# Patient Record
Sex: FEMALE | Race: WHITE | NOT HISPANIC OR LATINO | Employment: FULL TIME | URBAN - METROPOLITAN AREA
[De-identification: names, ages, dates, MRNs, and addresses within clinical notes are randomized per-mention and may not be internally consistent; named-entity substitution may affect disease eponyms.]

---

## 2017-08-02 ENCOUNTER — OFFICE VISIT (OUTPATIENT)
Dept: INTERNAL MEDICINE | Facility: CLINIC | Age: 57
End: 2017-08-02

## 2017-08-02 VITALS
HEART RATE: 72 BPM | OXYGEN SATURATION: 97 % | DIASTOLIC BLOOD PRESSURE: 72 MMHG | BODY MASS INDEX: 25.96 KG/M2 | SYSTOLIC BLOOD PRESSURE: 128 MMHG | WEIGHT: 156 LBS

## 2017-08-02 DIAGNOSIS — M19.049 PRIMARY OSTEOARTHRITIS OF HAND, UNSPECIFIED LATERALITY: Primary | ICD-10-CM

## 2017-08-02 DIAGNOSIS — T14.8XXA MUSCULOSKELETAL STRAIN: ICD-10-CM

## 2017-08-02 PROCEDURE — 99213 OFFICE O/P EST LOW 20 MIN: CPT | Performed by: NURSE PRACTITIONER

## 2017-08-02 PROCEDURE — 96372 THER/PROPH/DIAG INJ SC/IM: CPT | Performed by: NURSE PRACTITIONER

## 2017-08-02 RX ORDER — KETOROLAC TROMETHAMINE 30 MG/ML
60 INJECTION, SOLUTION INTRAMUSCULAR; INTRAVENOUS ONCE
Status: COMPLETED | OUTPATIENT
Start: 2017-08-02 | End: 2017-08-02

## 2017-08-02 RX ADMIN — KETOROLAC TROMETHAMINE 60 MG: 30 INJECTION, SOLUTION INTRAMUSCULAR; INTRAVENOUS at 14:57

## 2017-08-02 NOTE — PROGRESS NOTES
Subjective  Back strain/pain x3 days (discuss referral to [facility] (arthritis specialist))      Sherin Azul is a 57 y.o. female.   No Known Allergies  History of Present Illness      Digging stumps 3 days ago, bent over and back seized up, pain is above butt and intermittent but when it does come it is 10/10, did buy a back brace, has been taking aleve and doing heating pad with small amt relief , does not want to do prednisone     Wants to see dr west for her OA  The following portions of the patient's history were reviewed and updated as appropriate: allergies, current medications, past family history, past medical history, past social history, past surgical history and problem list.    Review of Systems   Musculoskeletal: Positive for arthralgias and back pain.   All other systems reviewed and are negative.      Objective   Physical Exam   Constitutional: She is oriented to person, place, and time. She appears well-developed and well-nourished.   HENT:   Head: Normocephalic and atraumatic.   Eyes: Conjunctivae are normal.   Cardiovascular: Normal rate.    Pulmonary/Chest: Effort normal.   Musculoskeletal:        Lumbar back: She exhibits tenderness, pain and spasm.   Neurological: She is alert and oriented to person, place, and time.   Skin: Skin is warm.   Psychiatric: She has a normal mood and affect. Her behavior is normal. Judgment and thought content normal.   Nursing note and vitals reviewed.    /72  Pulse 72  Wt 156 lb (70.8 kg)  SpO2 97%  BMI 25.96 kg/m2    Assessment/Plan     Problem List Items Addressed This Visit        Musculoskeletal and Integument    Osteoarthritis, hand - Primary    Relevant Orders    Ambulatory Referral to Rheumatology      Other Visit Diagnoses     Musculoskeletal strain        Relevant Medications    ketorolac (TORADOL) injection 60 mg (Start on 8/2/2017  2:45 PM)        No aleve today, alternate heat/ice therapy , if no relief 4 days will consider PT

## 2017-09-11 ENCOUNTER — OFFICE VISIT (OUTPATIENT)
Dept: INTERNAL MEDICINE | Facility: CLINIC | Age: 57
End: 2017-09-11

## 2017-09-11 VITALS
HEART RATE: 74 BPM | SYSTOLIC BLOOD PRESSURE: 128 MMHG | DIASTOLIC BLOOD PRESSURE: 82 MMHG | OXYGEN SATURATION: 99 % | WEIGHT: 164.02 LBS | BODY MASS INDEX: 27.29 KG/M2

## 2017-09-11 DIAGNOSIS — N63.0 SUBCUTANEOUS NODULE OF BREAST: Primary | ICD-10-CM

## 2017-09-11 PROCEDURE — 99213 OFFICE O/P EST LOW 20 MIN: CPT | Performed by: NURSE PRACTITIONER

## 2017-09-11 NOTE — PROGRESS NOTES
Subjective  Follow-up (Lump in Right Arm Pit)      Sherin Azul is a 57 y.o. female.   No Known Allergies  History of Present Illness      Noticed lump in right axilla x 2 mos,no changes , feels deep , tender , no fever chills, constant ache   The following portions of the patient's history were reviewed and updated as appropriate: allergies, current medications, past family history, past medical history, past social history, past surgical history and problem list.    Review of Systems   Skin:        Axilla nodule   All other systems reviewed and are negative.      Objective   Physical Exam   Constitutional: She is oriented to person, place, and time. She appears well-developed and well-nourished.   HENT:   Head: Normocephalic and atraumatic.   Eyes: Conjunctivae are normal.   Cardiovascular: Normal rate.    Pulmonary/Chest: Effort normal.       Neurological: She is alert and oriented to person, place, and time.   Skin: Skin is warm and dry.   Psychiatric: She has a normal mood and affect. Her behavior is normal. Judgment and thought content normal.   Nursing note and vitals reviewed.    /82  Pulse 74  Wt 164 lb 0.4 oz (74.4 kg)  SpO2 99%  BMI 27.29 kg/m2    Assessment/Plan     Problem List Items Addressed This Visit     None      Visit Diagnoses     Subcutaneous nodule of breast    -  Primary    Relevant Orders    Mammo Diagnostic Bilateral With CAD

## 2017-09-20 ENCOUNTER — APPOINTMENT (OUTPATIENT)
Dept: MAMMOGRAPHY | Facility: HOSPITAL | Age: 57
End: 2017-09-20

## 2017-09-26 ENCOUNTER — HOSPITAL ENCOUNTER (OUTPATIENT)
Dept: ULTRASOUND IMAGING | Facility: HOSPITAL | Age: 57
Discharge: HOME | End: 2017-09-26

## 2017-09-26 ENCOUNTER — HOSPITAL ENCOUNTER (OUTPATIENT)
Dept: MAMMOGRAPHY | Facility: HOSPITAL | Age: 57
Discharge: HOME | End: 2017-09-26
Admitting: NURSE PRACTITIONER

## 2017-09-26 DIAGNOSIS — N63.0 SUBCUTANEOUS NODULE OF BREAST: ICD-10-CM

## 2017-09-26 PROCEDURE — 76642 ULTRASOUND BREAST LIMITED: CPT

## 2017-09-26 PROCEDURE — 77062 BREAST TOMOSYNTHESIS BI: CPT | Performed by: RADIOLOGY

## 2017-09-26 PROCEDURE — G0204 DX MAMMO INCL CAD BI: HCPCS

## 2017-09-26 PROCEDURE — 77066 DX MAMMO INCL CAD BI: CPT | Performed by: RADIOLOGY

## 2017-09-26 PROCEDURE — G0279 TOMOSYNTHESIS, MAMMO: HCPCS

## 2017-09-26 PROCEDURE — 76642 ULTRASOUND BREAST LIMITED: CPT | Performed by: RADIOLOGY

## 2018-04-12 ENCOUNTER — TELEPHONE (OUTPATIENT)
Dept: INTERNAL MEDICINE | Facility: CLINIC | Age: 58
End: 2018-04-12

## 2018-04-12 ENCOUNTER — OFFICE VISIT (OUTPATIENT)
Dept: INTERNAL MEDICINE | Facility: CLINIC | Age: 58
End: 2018-04-12

## 2018-04-12 VITALS
HEART RATE: 76 BPM | WEIGHT: 156 LBS | BODY MASS INDEX: 25.96 KG/M2 | SYSTOLIC BLOOD PRESSURE: 108 MMHG | TEMPERATURE: 98.3 F | RESPIRATION RATE: 20 BRPM | DIASTOLIC BLOOD PRESSURE: 66 MMHG

## 2018-04-12 DIAGNOSIS — L03.311 CELLULITIS OF ABDOMINAL WALL: Primary | ICD-10-CM

## 2018-04-12 DIAGNOSIS — M79.642 BILATERAL HAND PAIN: ICD-10-CM

## 2018-04-12 DIAGNOSIS — M79.641 BILATERAL HAND PAIN: ICD-10-CM

## 2018-04-12 PROCEDURE — 99213 OFFICE O/P EST LOW 20 MIN: CPT | Performed by: NURSE PRACTITIONER

## 2018-04-12 RX ORDER — SULFAMETHOXAZOLE AND TRIMETHOPRIM 800; 160 MG/1; MG/1
1 TABLET ORAL 2 TIMES DAILY
Qty: 20 TABLET | Refills: 0 | Status: SHIPPED | OUTPATIENT
Start: 2018-04-12 | End: 2018-05-04

## 2018-04-12 RX ORDER — SODIUM PHOSPHATE,MONO-DIBASIC 19G-7G/118
ENEMA (ML) RECTAL 2 TIMES DAILY WITH MEALS
COMMUNITY
End: 2020-02-14

## 2018-04-12 RX ORDER — NAPROXEN 500 MG/1
500 TABLET ORAL 2 TIMES DAILY WITH MEALS
Qty: 30 TABLET | Refills: 0 | Status: SHIPPED | OUTPATIENT
Start: 2018-04-12 | End: 2018-05-22

## 2018-04-12 NOTE — PATIENT INSTRUCTIONS
Warm moist heat to lesion.  Do not polyp or tried to squeeze lesion.  Bactrim as discussed.  Naprosyn for joint pain follow-up with her primary care on Monday if not improving sooner if worsens

## 2018-04-12 NOTE — TELEPHONE ENCOUNTER
WOULD LIKE TO KNOW IF [facility] WILL ORDER LABS TO CHECK CHOLESTEROL AND TRIGLYCERIDES. THEY WERE HIGH THE LAST TIME SHE HAD THEM CHECKED AND WOULD LIKETO KNOW IF THERE HAS BEEN A CHANGE IN LEVELS AFTER DIET CHANGE.    CALL BACK [phone]

## 2018-04-12 NOTE — PROGRESS NOTES
Subjective   Sherin Azul is a 57 y.o. female.   Chief Complaint   Patient presents with   • Recurrent Skin Infections      History of Present Illness   has lesion adjacent to the abdomen directly above the umbilicus.  She denies drainage.  No fever or chills.  States the lesion is very sore.  She has a history of MRSA infection several years ago..  She does report she also has arthritis in her hands and she has been working extra.  She says the ibuprofen and Aleve over-the-counter are not helping.    The following portions of the patient's history were reviewed and updated as appropriate: allergies, current medications, past family history, past medical history, past social history, past surgical history and problem list.    Current Outpatient Prescriptions:   •  docusate sodium (STOOL SOFTENER) 100 MG capsule, Take  by mouth., Disp: , Rfl:   •  glucosamine sulfate 500 MG capsule capsule, Take  by mouth 2 (Two) Times a Day With Meals., Disp: , Rfl:   •  omeprazole (priLOSEC) 20 MG capsule, One po qd, Disp: 30 capsule, Rfl: 11  •  naproxen (NAPROSYN) 500 MG tablet, Take 1 tablet by mouth 2 (Two) Times a Day With Meals., Disp: 30 tablet, Rfl: 0  •  sulfamethoxazole-trimethoprim (BACTRIM DS) 800-160 MG per tablet, Take 1 tablet by mouth 2 (Two) Times a Day., Disp: 20 tablet, Rfl: 0    Review of Systems   Constitutional: Negative for activity change, appetite change, fatigue and fever.   HENT: Negative for ear pain, sinus pressure and sore throat.    Eyes: Negative.    Respiratory: Negative for cough and shortness of breath.    Cardiovascular: Negative for chest pain.   Gastrointestinal: Positive for abdominal pain. Negative for diarrhea, nausea and vomiting.        See history of present illness   Genitourinary: Negative for difficulty urinating.   Musculoskeletal: Positive for arthralgias. Negative for back pain and myalgias.        See history of present illness   Neurological: Negative for headaches.    Hematological: Negative.      /66   Pulse 76   Temp 98.3 °F (36.8 °C) (Oral)   Resp 20   Wt 70.8 kg (156 lb)   BMI 25.96 kg/m²     Objective   No Known Allergies    Physical Exam   Constitutional: She is oriented to person, place, and time. She appears well-developed and well-nourished. No distress.   Abdominal:   Has pustule to area above the umbilicus.  Some redness (less than 1 cm )surrounding pustule.   Musculoskeletal:   Tender to the knuckles of both hands.  No brina deformity noted.   Neurological: She is alert and oriented to person, place, and time.   Skin: Skin is warm and dry.   Color pink no rash   Nursing note and vitals reviewed.      Procedures    Assessment/Plan   Sherin was seen today for recurrent skin infections.    Diagnoses and all orders for this visit:    Cellulitis of abdominal wall  -     sulfamethoxazole-trimethoprim (BACTRIM DS) 800-160 MG per tablet; Take 1 tablet by mouth 2 (Two) Times a Day.    Bilateral hand pain  -     naproxen (NAPROSYN) 500 MG tablet; Take 1 tablet by mouth 2 (Two) Times a Day With Meals.          Patient Instructions   Warm moist heat to lesion.  Do not polyp or tried to squeeze lesion.  Bactrim as discussed.  Naprosyn for joint pain follow-up with her primary care on Monday if not improving sooner if worsens      Heather Rush, ROGERIO

## 2018-04-13 DIAGNOSIS — E78.5 HYPERLIPIDEMIA, UNSPECIFIED HYPERLIPIDEMIA TYPE: Primary | ICD-10-CM

## 2018-05-04 ENCOUNTER — OFFICE VISIT (OUTPATIENT)
Dept: INTERNAL MEDICINE | Facility: CLINIC | Age: 58
End: 2018-05-04

## 2018-05-04 VITALS
WEIGHT: 155 LBS | HEIGHT: 65 IN | HEART RATE: 77 BPM | BODY MASS INDEX: 25.83 KG/M2 | OXYGEN SATURATION: 98 % | DIASTOLIC BLOOD PRESSURE: 66 MMHG | SYSTOLIC BLOOD PRESSURE: 120 MMHG

## 2018-05-04 DIAGNOSIS — B00.1 RECURRENT COLD SORES: ICD-10-CM

## 2018-05-04 DIAGNOSIS — Z00.00 ROUTINE ADULT HEALTH MAINTENANCE: Primary | ICD-10-CM

## 2018-05-04 DIAGNOSIS — Z11.59 NEED FOR HEPATITIS C SCREENING TEST: ICD-10-CM

## 2018-05-04 DIAGNOSIS — R20.0 BILATERAL HAND NUMBNESS: ICD-10-CM

## 2018-05-04 LAB
ALBUMIN SERPL-MCNC: 4.5 G/DL (ref 3.2–4.8)
ALBUMIN/GLOB SERPL: 1.7 G/DL (ref 1.5–2.5)
ALP SERPL-CCNC: 82 U/L (ref 25–100)
ALT SERPL W P-5'-P-CCNC: 28 U/L (ref 7–40)
ANION GAP SERPL CALCULATED.3IONS-SCNC: 8 MMOL/L (ref 3–11)
ARTICHOKE IGE QN: 152 MG/DL (ref 0–130)
AST SERPL-CCNC: 27 U/L (ref 0–33)
BILIRUB SERPL-MCNC: 0.4 MG/DL (ref 0.3–1.2)
BUN BLD-MCNC: 17 MG/DL (ref 9–23)
BUN/CREAT SERPL: 28.3 (ref 7–25)
CALCIUM SPEC-SCNC: 9.1 MG/DL (ref 8.7–10.4)
CHLORIDE SERPL-SCNC: 107 MMOL/L (ref 99–109)
CHOLEST SERPL-MCNC: 211 MG/DL (ref 0–200)
CO2 SERPL-SCNC: 27 MMOL/L (ref 20–31)
CREAT BLD-MCNC: 0.6 MG/DL (ref 0.6–1.3)
DEPRECATED RDW RBC AUTO: 43.7 FL (ref 37–54)
ERYTHROCYTE [DISTWIDTH] IN BLOOD BY AUTOMATED COUNT: 12.8 % (ref 11.3–14.5)
GFR SERPL CREATININE-BSD FRML MDRD: 103 ML/MIN/1.73
GLOBULIN UR ELPH-MCNC: 2.6 GM/DL
GLUCOSE BLD-MCNC: 80 MG/DL (ref 70–100)
HCT VFR BLD AUTO: 39.5 % (ref 34.5–44)
HCV AB SER DONR QL: NORMAL
HDLC SERPL-MCNC: 54 MG/DL (ref 40–60)
HGB BLD-MCNC: 13.1 G/DL (ref 11.5–15.5)
MCH RBC QN AUTO: 31.3 PG (ref 27–31)
MCHC RBC AUTO-ENTMCNC: 33.2 G/DL (ref 32–36)
MCV RBC AUTO: 94.3 FL (ref 80–99)
PLATELET # BLD AUTO: 234 10*3/MM3 (ref 150–450)
PMV BLD AUTO: 9 FL (ref 6–12)
POTASSIUM BLD-SCNC: 4 MMOL/L (ref 3.5–5.5)
PROT SERPL-MCNC: 7.1 G/DL (ref 5.7–8.2)
RBC # BLD AUTO: 4.19 10*6/MM3 (ref 3.89–5.14)
SODIUM BLD-SCNC: 142 MMOL/L (ref 132–146)
TRIGL SERPL-MCNC: 104 MG/DL (ref 0–150)
TSH SERPL DL<=0.05 MIU/L-ACNC: 1.83 MIU/ML (ref 0.35–5.35)
WBC NRBC COR # BLD: 8 10*3/MM3 (ref 3.5–10.8)

## 2018-05-04 PROCEDURE — 99396 PREV VISIT EST AGE 40-64: CPT | Performed by: NURSE PRACTITIONER

## 2018-05-04 PROCEDURE — 86695 HERPES SIMPLEX TYPE 1 TEST: CPT | Performed by: NURSE PRACTITIONER

## 2018-05-04 PROCEDURE — 80053 COMPREHEN METABOLIC PANEL: CPT | Performed by: NURSE PRACTITIONER

## 2018-05-04 PROCEDURE — 86696 HERPES SIMPLEX TYPE 2 TEST: CPT | Performed by: NURSE PRACTITIONER

## 2018-05-04 PROCEDURE — 80061 LIPID PANEL: CPT | Performed by: NURSE PRACTITIONER

## 2018-05-04 PROCEDURE — 86803 HEPATITIS C AB TEST: CPT | Performed by: NURSE PRACTITIONER

## 2018-05-04 PROCEDURE — 84443 ASSAY THYROID STIM HORMONE: CPT | Performed by: NURSE PRACTITIONER

## 2018-05-04 PROCEDURE — 85027 COMPLETE CBC AUTOMATED: CPT | Performed by: NURSE PRACTITIONER

## 2018-05-04 NOTE — PROGRESS NOTES
"Subjective  Annual Exam (w/o pap. Would like labs to check for HSV )      Sherin Azul is a 58 y.o. female.   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    Review of Systems   Genitourinary: Positive for genital sores (none visible today ).   Neurological: Positive for numbness.   All other systems reviewed and are negative.      Objective   Physical Exam   Constitutional: She is oriented to person, place, and time. She appears well-developed and well-nourished.   HENT:   Head: Normocephalic and atraumatic.   Right Ear: External ear normal.   Left Ear: External ear normal.   Mouth/Throat: Oropharynx is clear and moist.   Eyes: Conjunctivae and EOM are normal. Pupils are equal, round, and reactive to light.   Neck: Normal range of motion. Neck supple. No thyromegaly present.   Cardiovascular: Normal rate, regular rhythm, normal heart sounds and intact distal pulses.    Pulmonary/Chest: Effort normal and breath sounds normal.   Abdominal: Soft. Bowel sounds are normal.   Musculoskeletal: Normal range of motion.   Lymphadenopathy:     She has no cervical adenopathy.   Neurological: She is alert and oriented to person, place, and time. She has normal reflexes. She displays normal reflexes. No cranial nerve deficit.   Skin: Skin is warm and dry.   Psychiatric: She has a normal mood and affect. Her behavior is normal. Judgment and thought content normal.   Nursing note and vitals reviewed.    /66   Pulse 77   Ht 165.1 cm (65\")   Wt 70.3 kg (155 lb)   SpO2 98%   BMI 25.79 kg/m² "     Assessment/Plan     Problem List Items Addressed This Visit     None      Visit Diagnoses     Routine adult health maintenance    -  Primary    Relevant Orders    CBC (No Diff)    Comprehensive Metabolic Panel    TSH    Lipid Panel    Need for hepatitis C screening test        Relevant Orders    Hepatitis C antibody    Recurrent cold sores        Relevant Orders    HSV 1 & 2 - Specific Antibody, IgG    Bilateral hand numbness        Relevant Orders    EMG & Nerve Conduction Test          Labs today will review and call pt with poc , emg studies ordered for hand numbness

## 2018-05-07 ENCOUNTER — TELEPHONE (OUTPATIENT)
Dept: INTERNAL MEDICINE | Facility: CLINIC | Age: 58
End: 2018-05-07

## 2018-05-07 LAB
HSV1 IGG SER IA-ACNC: 26.3 INDEX (ref 0–0.9)
HSV2 IGG SER IA-ACNC: 8.74 INDEX (ref 0–0.9)

## 2018-05-07 NOTE — TELEPHONE ENCOUNTER
PT CALLED WANTING TO LEAVE A MESSAGE FOR PRECIOUS CLINE OR AVAILABLE MA. THE PT STATES SHE RECENTLY HAD LABS DONE AND WAS HOPING TO HAVE AN MA GO OVER THOSE RESULTS. PT BEST CALL BACK # [phone]

## 2018-05-09 ENCOUNTER — TRANSCRIBE ORDERS (OUTPATIENT)
Dept: ONCOLOGY | Facility: CLINIC | Age: 58
End: 2018-05-09

## 2018-05-09 DIAGNOSIS — Z12.31 VISIT FOR SCREENING MAMMOGRAM: Primary | ICD-10-CM

## 2018-05-10 NOTE — TELEPHONE ENCOUNTER
Spoke with patient regarding lab results. Pt would like a rx called in for the herpes infection. She is currently having a break out. Please advise.

## 2018-05-11 RX ORDER — VALACYCLOVIR HYDROCHLORIDE 1 G/1
1000 TABLET, FILM COATED ORAL 3 TIMES DAILY
Qty: 21 TABLET | Refills: 0 | Status: SHIPPED | OUTPATIENT
Start: 2018-05-11 | End: 2018-05-22

## 2018-05-11 NOTE — TELEPHONE ENCOUNTER
"Called pt, no answer. LVM explaining patient has called in \"a Rx\" for her. Gave office # for any questions.   "

## 2018-05-21 ENCOUNTER — TELEPHONE (OUTPATIENT)
Dept: ONCOLOGY | Facility: CLINIC | Age: 58
End: 2018-05-21

## 2018-05-21 ENCOUNTER — OFFICE VISIT (OUTPATIENT)
Dept: INTERNAL MEDICINE | Facility: CLINIC | Age: 58
End: 2018-05-21

## 2018-05-21 VITALS
BODY MASS INDEX: 25.76 KG/M2 | OXYGEN SATURATION: 96 % | HEIGHT: 65 IN | SYSTOLIC BLOOD PRESSURE: 122 MMHG | HEART RATE: 86 BPM | WEIGHT: 154.6 LBS | DIASTOLIC BLOOD PRESSURE: 66 MMHG

## 2018-05-21 DIAGNOSIS — N89.8 VAGINAL ITCHING: Primary | ICD-10-CM

## 2018-05-21 PROCEDURE — 87591 N.GONORRHOEAE DNA AMP PROB: CPT | Performed by: NURSE PRACTITIONER

## 2018-05-21 PROCEDURE — 87798 DETECT AGENT NOS DNA AMP: CPT | Performed by: NURSE PRACTITIONER

## 2018-05-21 PROCEDURE — 87661 TRICHOMONAS VAGINALIS AMPLIF: CPT | Performed by: NURSE PRACTITIONER

## 2018-05-21 PROCEDURE — 87481 CANDIDA DNA AMP PROBE: CPT | Performed by: NURSE PRACTITIONER

## 2018-05-21 PROCEDURE — 87491 CHLMYD TRACH DNA AMP PROBE: CPT | Performed by: NURSE PRACTITIONER

## 2018-05-21 PROCEDURE — 99213 OFFICE O/P EST LOW 20 MIN: CPT | Performed by: NURSE PRACTITIONER

## 2018-05-21 NOTE — TELEPHONE ENCOUNTER
LM on patient vm.  Per Dr. Park, with patients breast cancer history she is not a candidate for hormone replacement therapy.

## 2018-05-21 NOTE — TELEPHONE ENCOUNTER
----- Message from Jennifer Dubois sent at 5/21/2018  9:07 AM EDT -----  Regarding: [facility] - HORMONE REPLACEMENT  Contact: [phone]  PATIENT CALLED AND WANTS TO KNOW IF SHE IS A CANDIDATE FOR HORMONE REPLACEMENT. SHE HAS AN APPT WITH HER PRIMARY CARE PHYSICIAN THIS MORNING AT 10:30AM .      SHE IS CURIOUS BECAUSE OF HER HISTORY OF BREAST CANCER.

## 2018-05-21 NOTE — PROGRESS NOTES
"Subjective  Vaginal Itching (C/o severe vaginal and anal itching, Rx for Valtrex does not seem to be working)      Sherin Azul is a 58 y.o. female.   No Known Allergies  History of Present Illness      Continues with vaginal itching and feeling uncomfortable in the vaginal area , thinks the valtrex made it worse, has used hydrocortisone and bacitracin on the labia w/o relief   The following portions of the patient's history were reviewed and updated as appropriate: allergies, current medications, past medical history and problem list.    Review of Systems   Genitourinary:        Vaginal itching   All other systems reviewed and are negative.      Objective   Physical Exam   Constitutional: She appears well-developed and well-nourished.   HENT:   Head: Normocephalic and atraumatic.   Eyes: Conjunctivae are normal.   Cardiovascular: Normal rate.    Pulmonary/Chest: Effort normal.   Genitourinary:       There is tenderness on the right labia. There is tenderness on the left labia.   Genitourinary Comments: erythematous   Skin: Skin is warm and dry.   Psychiatric: She has a normal mood and affect. Her behavior is normal. Judgment and thought content normal.   Nursing note and vitals reviewed.    /66   Pulse 86   Ht 165.1 cm (65\")   Wt 70.1 kg (154 lb 9.6 oz)   SpO2 96%   BMI 25.73 kg/m²     Assessment/Plan     Problem List Items Addressed This Visit     None      Visit Diagnoses     Vaginal itching    -  Primary    Relevant Orders    Ambulatory Referral to Gynecology    Nuab VG+ - Swab, Vagina      WILL CALL WITH RESULTS AND POC         "

## 2018-05-22 ENCOUNTER — OFFICE VISIT (OUTPATIENT)
Dept: OBSTETRICS AND GYNECOLOGY | Facility: CLINIC | Age: 58
End: 2018-05-22

## 2018-05-22 VITALS
WEIGHT: 154.8 LBS | HEIGHT: 65 IN | BODY MASS INDEX: 25.79 KG/M2 | SYSTOLIC BLOOD PRESSURE: 102 MMHG | DIASTOLIC BLOOD PRESSURE: 70 MMHG

## 2018-05-22 DIAGNOSIS — N81.6 RECTOCELE: ICD-10-CM

## 2018-05-22 DIAGNOSIS — Z78.0 MENOPAUSE: ICD-10-CM

## 2018-05-22 DIAGNOSIS — L40.9 PSORIASIS: Primary | ICD-10-CM

## 2018-05-22 PROCEDURE — 99203 OFFICE O/P NEW LOW 30 MIN: CPT | Performed by: OBSTETRICS & GYNECOLOGY

## 2018-05-22 RX ORDER — BETAMETHASONE DIPROPIONATE 0.5 MG/G
1 CREAM TOPICAL DAILY
COMMUNITY
End: 2018-09-13

## 2018-05-22 RX ORDER — NAPROXEN SODIUM 220 MG
1 TABLET ORAL EVERY 8 HOURS
COMMUNITY
End: 2019-04-05

## 2018-05-22 RX ORDER — IBUPROFEN 600 MG/1
1 TABLET ORAL AS NEEDED
COMMUNITY
End: 2019-04-05

## 2018-05-22 NOTE — PROGRESS NOTES
"   Chief Complaint   Patient presents with   • Gynecologic Exam     c/o \"extreme itching, blistering, dryness of vaginal/anal area\" off and on X 5 yrs.       Sherin Azul is a 58 y.o. year old  presenting to be seen because of complaints of persistent vulvar irritation, itching, and burning.  These symptoms have been present for over 5 years.  The patient gives a history of an LAVH/BSO by Dr. Leon for cervical dysplasia.  She has had a left breast lumpectomy followed by radiation and chemotherapy for carcinoma.  She has had no evidence of recurrence.  She does not use estrogen replacement therapy.  She gives a history of recurrent oral labial and genital herpes infections, with a history of positive serology for type I and type II.  She has no current symptoms consistent with genital herpes infection.  She denies bowel or urinary symptoms.    History   Sexual Activity   • Sexual activity: Defer     SCREENING TESTS    Year 2012   Age                         PAP                         HPV high risk                         Mammogram      benign                   KAYLYN score                         Breast MRI                         Lipids                         Vitamin D                         Colonoscopy                         DEXA  Frax (hip/any)                         Ovarian Screen                             No Additional Complaints Reported    The following portions of the patient's history were reviewed and updated as appropriate:vital signs and   She  does not have any pertinent problems on file.  She  has a past surgical history that includes Breast biopsy (Left, ); Breast lumpectomy (Left, ); Oophorectomy; and laparoscopic assisted vaginal hysterectomy salpingo oophorectomy (Bilateral).  Her family history includes Breast cancer (age of onset: 40) in her mother; Breast cancer " "(age of onset: 43) in her sister; Breast cancer (age of onset: 45) in her maternal aunt; Ovarian cancer (age of onset: 40) in her sister.  She  reports that she has quit smoking. She has never used smokeless tobacco. She reports that she does not drink alcohol or use drugs.  Current Outpatient Prescriptions   Medication Sig Dispense Refill   • betamethasone dipropionate (DIPROLENE) 0.05 % cream Apply 1 application topically Daily.     • Calcium Citrate (CITRACAL PO) Take  by mouth.     • docusate sodium (STOOL SOFTENER) 100 MG capsule Take  by mouth.     • glucosamine sulfate 500 MG capsule capsule Take  by mouth 2 (Two) Times a Day With Meals.     • ibuprofen (ADVIL,MOTRIN) 600 MG tablet Take 1 tablet by mouth As Needed.     • naproxen sodium (ALEVE) 220 MG tablet Take 1 tablet by mouth Every 8 (Eight) Hours.     • omeprazole (priLOSEC) 20 MG capsule One po qd 30 capsule 11     No current facility-administered medications for this visit.      She has No Known Allergies..        Review of Systems  A comprehensive review of systems was done.  Constitutional: negative for fever, chills, activity change, appetite change, fatigue and unexpected weight change.  Respiratory: negative  Cardiovascular: negative  Gastrointestinal: negative  Genitourinary:chronic itching and burning of the vulva and perineum  Musculoskeletal:negative  Behavioral/Psych: negative          /70   Ht 165.1 cm (65\")   Wt 70.2 kg (154 lb 12.8 oz)   BMI 25.76 kg/m²     Physical Exam    General:  alert; cooperative; well developed; well nourished   Skin:  Psoriatic eruption of knees and elbows   Thyroid: normal to inspection and palpation   Lungs:  clear to auscultation bilaterally   Heart:  regular rate and rhythm, S1, S2 normal, no murmur, click, rub or gallop   Breasts:  Not performed.  patient refuses   Abdomen: soft, non-tender; no masses  no umbilical or inginual hernias are present  no hepato-splenomegaly   Pelvis: Clinical staff was " present for exam  External genitalia:  severe erythematous changes from mons pubis to anus, with thickening of the skin and occasional breaks in the skin from scratching  Vaginal:  normal pink mucosa without prolapse or lesions.  Cervix:  absent.  Uterus:  absent.  Adnexa:  absent, bilateral.  Rectal:  Anal erythema.  Grade I rectocele     Lab Review   No data reviewed    Imaging   Mammogram results    Medical counseling was given to patient for the following topics: diagnostic results, instructions for management, risk factor reductions and impressions . Total time of the encounter was 36 minute(s) and 21 minute(s)  was spent in face to face counseling.  I have counseled the patient that she does not have evidence of vaginal atrophy.  I have counseled the patient that she has no clinical evidence of a recurrent episode of genital herpes infection.  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  I have counseled the patient that I do not feel that vaginal or vulvar hormonal therapy will be beneficial.  I have counseled the patient that I will do a PCR test from the vulva for genital ulcer disease.  I have counseled the patient that I would like for her to see dermatology to determine the best mode of therapy for her psoriasis.  I will then prescribe and follow going forward.          ASSESSMENT  Problems Addressed this Visit        Musculoskeletal and Integument    Psoriasis - Primary    Relevant Medications    betamethasone dipropionate (DIPROLENE) 0.05 % cream       Genitourinary    Menopause            PLAN    · Medications prescribed this encounter:  No orders of the defined types were placed in this encounter.  · PCR from vulva  · Dermatology appointment  · Follow up: 3 month(s)  · Calcium, 600 mg/ Vit. D, 400 IU daily     *Please note that portions of this documentation may have been completed with a voice recognition program.  Efforts were made  to edit this dictation, but occasional words may have been mistranscribed.     This note was electronically signed.    WAQAR Leahy MD  May 22, 2018  11:52 AM

## 2018-05-24 LAB
A VAGINAE DNA VAG QL NAA+PROBE: NORMAL SCORE
BVAB2 DNA VAG QL NAA+PROBE: NORMAL SCORE
C ALBICANS DNA VAG QL NAA+PROBE: NEGATIVE
C GLABRATA DNA VAG QL NAA+PROBE: NEGATIVE
C TRACH RRNA SPEC DONR QL NAA+PROBE: NEGATIVE
MEGASPHAERA 1: NORMAL SCORE
N GONORRHOEA DNA SPEC QL NAA+PROBE: NEGATIVE
T VAGINALIS RRNA GENITAL QL PROBE: NEGATIVE

## 2018-05-25 ENCOUNTER — TELEPHONE (OUTPATIENT)
Dept: INTERNAL MEDICINE | Facility: CLINIC | Age: 58
End: 2018-05-25

## 2018-06-01 ENCOUNTER — HOSPITAL ENCOUNTER (OUTPATIENT)
Dept: NEUROLOGY | Facility: HOSPITAL | Age: 58
Discharge: HOME | End: 2018-06-01
Admitting: NURSE PRACTITIONER

## 2018-06-01 ENCOUNTER — TELEPHONE (OUTPATIENT)
Dept: INTERNAL MEDICINE | Facility: CLINIC | Age: 58
End: 2018-06-01

## 2018-06-01 DIAGNOSIS — R20.0 BILATERAL HAND NUMBNESS: ICD-10-CM

## 2018-06-01 PROCEDURE — 95910 NRV CNDJ TEST 7-8 STUDIES: CPT

## 2018-06-01 PROCEDURE — 95886 MUSC TEST DONE W/N TEST COMP: CPT

## 2018-06-01 NOTE — TELEPHONE ENCOUNTER
PATIENT STATES SHE RECEIVED A CALL FROM US AND COULD NOT HEAR THE VOICEMAIL. SHE WOULD LIKE A CALL BACK -[phone]

## 2018-06-25 ENCOUNTER — TRANSCRIBE ORDERS (OUTPATIENT)
Dept: PHYSICAL THERAPY | Facility: HOSPITAL | Age: 58
End: 2018-06-25

## 2018-06-25 ENCOUNTER — HOSPITAL ENCOUNTER (OUTPATIENT)
Dept: PHYSICAL THERAPY | Facility: HOSPITAL | Age: 58
Setting detail: THERAPIES SERIES
Discharge: HOME | End: 2018-06-25

## 2018-06-25 DIAGNOSIS — S16.1XXA STRAIN OF NECK MUSCLE, INITIAL ENCOUNTER: Primary | ICD-10-CM

## 2018-06-25 DIAGNOSIS — M54.2 NECK PAIN ON RIGHT SIDE: ICD-10-CM

## 2018-06-25 DIAGNOSIS — M54.12 CERVICAL RADICULOPATHY: Primary | ICD-10-CM

## 2018-06-25 DIAGNOSIS — M25.562 KNEE PAIN, LEFT ANTERIOR: ICD-10-CM

## 2018-06-25 PROCEDURE — 97161 PT EVAL LOW COMPLEX 20 MIN: CPT

## 2018-06-25 NOTE — THERAPY EVALUATION
Outpatient Physical Therapy Ortho Initial Evaluation  EVANGELISTA Lua     Patient Name: Sherin Azul  : 1960  MRN: [id number]  Today's Date: 2018      Visit Date: 2018    Patient Active Problem List   Diagnosis   • Insomnia   • Osteoarthritis, hand   • Current every day smoker   • Dyslipidemia   • Candida infection of genital region   • Gastroesophageal reflux disease   • Anal fissure   • Varicose veins of legs   • Osteoarthritis   • Hx of radiation therapy   • History of MRSA infection   • History of cervical dysplasia   • History of breast cancer   • Herpes   • Eczema   • Psoriasis   • Menopause   • Rectocele        Past Medical History:   Diagnosis Date   • Acrochordon    • Acute maxillary sinusitis    • Acute otitis media    • Breast cancer 2005    left   • Dermatitis    • Drug therapy 2005   • Eczema    • H/O alcohol abuse    • Herpes    • History of breast cancer    • History of cervical dysplasia    • History of MRSA infection    • Hx of radiation therapy 2005   • Joint pain, hip    • Joint pain, knee    • Osteoarthritis    • Psoriasis    • Scabies    • Tendonitis    • Varicose veins of legs    • Viral syndrome    • Weight gain         Past Surgical History:   Procedure Laterality Date   • BREAST BIOPSY Left    • BREAST LUMPECTOMY Left     lumpectomy   • LAPAROSCOPIC ASSISTED VAGINAL HYSTERECTOMY SALPINGO OOPHORECTOMY Bilateral    • OOPHORECTOMY         Visit Dx:     ICD-10-CM ICD-9-CM   1. Cervical radiculopathy M54.12 723.4   2. Neck pain on right side M54.2 723.1   3. Knee pain, left anterior M25.562 719.46             Patient History     Row Name 18 0749             History    Chief Complaint Pain;Numbness  -AC      Type of Pain Wrist pain;Shoulder pain;Knee pain  -AC      Date Current Problem(s) Began 18  -      Brief Description of Current Complaint Nerve conduction study a few weeks ago, found a pinched nerve and R-sided CTS.  Works as a ,  currently working light duty.  Cannot lift >10 lbs at this time.  Feels burning in mid back and pain in R arm while lifting/doing work activities with pain in hands. Numbness in palm of hand primarily on medial side.  Picked up box of potatoes on June 14th, which triggered pain.  -AC      Previous treatment for THIS PROBLEM Other (comment);Medication   Heating pad, Ibuprofen, Flexeril to help sleep at night  -AC      Patient/Caregiver Goals Relieve pain;Return to prior level of function  -AC      Patient's Rating of General Health Good  -AC      Hand Dominance right-handed  -AC      Occupation/sports/leisure activities , reading  -AC      What clinical tests have you had for this problem? Nerve Conduction Test  -AC      Results of Clinical Tests Pinched nerve in neck  -AC      History of Previous Related Injuries None  -AC      Are you or can you be pregnant No  -AC         Pain     Pain Location Neck;Shoulder  -AC      Pain at Present 5  -AC      Pain at Best 2  -AC      Pain at Worst 8  -AC      Pain Frequency Intermittent  -AC      Pain Description Burning;Aching;Nagging;Numbness   Deep pain in R hand, burning in mid back  -AC      What Performance Factors Make the Current Problem(s) WORSE? Lifting, repeated chopping  -AC      What Performance Factors Make the Current Problem(s) BETTER? Rest  -AC      Is medication used to assist with sleep? Yes   Flexeril  -AC      Total hours of sleep per night 3-4 hours without medication; 6-7 hours with Flexeril  -AC      Difficulties at work? Difficulty chopping/lifting  -AC         Fall Risk Assessment    Any falls in the past year: No  -AC         Daily Activities    Primary Language English  -AC      How does patient learn best? Other (comment)   Doing  -AC      Barriers to learning None  -AC         Safety    Are you being hurt, hit, or frightened by anyone at home or in your life? No  -AC      Are you being neglected by a caregiver No  -AC        User Key  (r)  = Recorded By, (t) = Taken By, (c) = Cosigned By    Initials Name Provider Type    AC Ilene Rivas PT Physical Therapist                PT Ortho     Row Name 06/25/18 0749       Precautions and Contraindications    Precautions/Limitations other (see comments)   No lifting > 10 lbs  -AC       Posture/Observations    Alignment Options Forward head;Rounded shoulders  -AC    Forward Head Mild  -AC    Rounded Shoulders Mild  -AC       Quarter Clearing    Quarter Clearing Upper Quarter Clearing  -AC       DTR- Upper Quarter Clearing    Biceps (C5/6) Bilateral:;1- Minimal response  -AC    Brachioradialis (C6) Bilateral:;1- Minimal response  -AC    Triceps (C7) Left:;1- Minimal response;Right:;2- Normal response  -AC       Neural Tension Signs- Upper Quarter Clearing    ULNTT 1 Right:;Postive  -AC    ULNTT 4 Right:;Postive  -AC       Sensory Screen for Light Touch- Upper Quarter Clearing    C4 (posterior shoulder) Left:;Diminished  -AC    C5 (lateral upper arm) Left:;Diminished  -AC    C6 (tip of thumb) Bilateral:;Intact  -AC    C7 (tip of 3rd finger) Left:;Diminished  -AC    C8 (tip of 5th finger) Bilateral:;Intact  -AC    T1 (medial lower arm) Bilateral:;Intact  -AC       Special Tests/Palpation    Special Tests/Palpation Knee   Slight crepitation with superior patellar glides bilaterally  -AC       Cervical/Thoracic Special Tests    Spurlings (Foraminal Compression) Negative  -AC    Cervical Compression (Forarminal Compression vs. Facet Pain) Negative  -AC    Cervical Distraction (Foraminal Compression vs. Facet Pain) Negative  -AC    Tinel's Sign (TOS) Negative   For ulnar nerve  -AC    Phalen's Test (Carpal Tunnel Syndrome) Negative  -AC       Head/Neck/Trunk    Neck Extension AROM 38   R-sided neck pain  -AC    Neck Flexion AROM 27   R-sided neck pain  -AC    Neck Lt Lateral Flexion AROM 28   R-sided neck pain  -AC    Neck Rt Lateral Flexion AROM 36  -AC    Neck Lt Rotation AROM 55  -AC    Neck Rt Rotation AROM  54  -AC       Neck/Trunk (Manual Muscle Testing)    Comment, Neck/Trunk: Manual Muscle Testing (MMT) Pain with resisted cervical flexion and R side-bending  -AC       Upper Extremity (Manual Muscle Testing)    Comment, MMT: Upper Extremity Shoulder ABD: L= 4/5, R= 4-/5 (p!); Flexion = 4+/5 bilaterally; Elbow flexion and extension = 5/5 bilaterally (pain with RUE in elbow/forearm); shoulder IR/ER L= 5/5, R = 4+/5; wrist flexion/extension = 4/5 R, 4+/5 L  -AC       Lower Extremity (Manual Muscle Testing)    Lower Extremity: Manual Muscle Testing (MMT) left LE strength is WNL   Pain in L knee with resisted hip flexion and ER  -AC        Strength Right    # Reps 3  -AC    Right Rung 2  -AC    Right  Test 1 32  -AC    Right  Test 2 34  -AC    Right  Test 3 34  -AC     Strength Average Right 33.33  -AC        Strength Left    # Reps 3  -AC    Left Rung 2  -AC    Left  Test 1 40  -AC    Left  Test 2 35  -AC    Left  Test 3 39  -AC     Strength Average Left 38  -AC       Sensation    Sensation WNL? WNL  -AC    Light Touch Partial deficits in the LUE   In C4, C5, and C7 dermatome  -AC       Hand  Strength     Strength Affected Side Bilateral  -AC      User Key  (r) = Recorded By, (t) = Taken By, (c) = Cosigned By    Initials Name Provider Type    AC Ilene Rivas, PT Physical Therapist                      Therapy Education  Education Details: Pt provided HEP including: chin tucks, corner stretch, median/ulnar nerve glides, band pull aparts and scapular retraction (red TB provided)  Given: HEP  Program: New  How Provided: Verbal, Demonstration, Written  Provided to: Patient  Level of Understanding: Verbalized, Demonstrated           PT OP Goals     Row Name 06/25/18 0749          PT Short Term Goals    STG Date to Achieve 07/16/18  -     STG 1 Independently perform HEP including upper quarter strengthening/stability and neural glides to self-reduce radicular symptoms.   -AC     STG 1 Progress New  -AC     STG 2 Report a decrease in R-sided neck/shoulder pain by 50%.  -AC     STG 2 Progress New  -AC     STG 3 Be able to perform work duties with decrease in pain to < 5/10.  -AC     STG 3 Progress New  -AC        Long Term Goals    LTG Date to Achieve 08/06/18  -AC     LTG 1 Reduce QuickDASH score to < or = 45%.  -AC     LTG 1 Progress New  -AC     LTG 2 Reduce LEFS score to < or = 30%.  -AC     LTG 3 Be able to perform full-duty work activities (lifting, chopping) with minimal- no UE pain.  -AC     LTG 3 Progress New  -AC        Time Calculation    PT Goal Re-Cert Due Date 09/23/18  -AC       User Key  (r) = Recorded By, (t) = Taken By, (c) = Cosigned By    Initials Name Provider Type    AC Ilene Rivas, PT Physical Therapist                PT Assessment/Plan     Row Name 06/25/18 0749          PT Assessment    Functional Limitations Limitation in home management;Limitations in community activities;Performance in work activities;Performance in leisure activities;Performance in self-care ADL  -AC     Impairments Pain;Muscle strength;Sensation  -AC     Assessment Comments Pt presents with signs and symptoms consistent with cervical radiculopathy affecting the median and ulnar nerves with associated hand weakness and pain, as well as patellofemoral pain and crepitus.  Pt. would benefit from PT intervention to address upper and lower quarter strengthening to address pain and functional deficits.  -AC     Please refer to paper survey for additional self-reported information Yes  -AC     Rehab Potential Good  -AC     Patient/caregiver participated in establishment of treatment plan and goals Yes  -AC     Patient would benefit from skilled therapy intervention Yes  -AC        PT Plan    PT Frequency 2x/week  -AC     Predicted Duration of Therapy Intervention (Therapy Eval) 12 visits  -AC     Planned CPT's? PT EVAL LOW COMPLEXITY: 55342;PT RE-EVAL: 12110;PT SELF CARE/HOME MGMT/TRAIN EA  15: 17860;PT IONTOPHORESIS EA 15 MIN: 76202;PT ULTRASOUND EA 15 MIN: 68980;PT MANUAL THERAPY EA 15 MIN: 92468;PT ELECTRICAL STIM UNATTEND: ;PT HOT/COLD PACK WC NONMCARE: 56025;PT THER ACT EA 15 MIN: 25465;PT TRACTION CERVICAL: 19689;PT THER SUPP EA 15 MIN;PT THER PROC EA 15 MIN: 18560  -AC     PT Plan Comments Develop POC to incorporate upper quarter and LE strengthening, with manual therapy and modalities as necessary.  -AC       User Key  (r) = Recorded By, (t) = Taken By, (c) = Cosigned By    Initials Name Provider Type    AC Ilene Rivas PT Physical Therapist                              Outcome Measure Options: Quick DASH, Lower Extremity Functional Scale (LEFS)  Quick DASH  Open a tight or new jar.: Severe Difficulty  Do heavy household chores (e.g., wash walls, wash floors): Moderate Difficulty  Carry a shopping bag or briefcase: Severe Difficulty  Wash your back: Moderate Difficulty  Use a knife to cut food: Mild Difficulty  Recreational activities in which you take some force or impact through your arm, should or hand (e.g. golf, hammering, tennis, etc.): Severe Difficulty  During the past week, to what extent has your arm, shoulder, or hand problem interfered with your normal social activites with family, friends, neighbors or groups?: Quite a bit  During the past week, were you limited in your work or other regular daily activities as a result of your arm, shoulder or hand problem?: Very limited  Arm, Shoulder, or hand pain: Severe  Tingling (pins and needles) in your arm, shoulder, or hand: Moderate  During the past week, how much difficulty have you had sleeping because of the pain in your arm, shoulder or hand?: So much Difficulty that I can't sleep  Number of Questions Answered: 11  Quick DASH Score: 65.91  Lower Extremity Functional Index  Any of your usual work, housework or school activities: Moderate difficulty  Your usual hobbies, recreational or sporting activities: A little bit of  difficulty  Getting into or out of the bath: A little bit of difficulty  Walking between rooms: Moderate difficulty  Putting on your shoes or socks: A little bit of difficulty  Squatting: Quite a bit of difficulty  Lifting an object, like a bag of groceries from the floor: Quite a bit of difficulty  Performing light activities around your home: Moderate difficulty  Performing heavy activities around your home: Quite a bit of difficulty  Getting into or out of a car: Moderate difficulty  Walking 2 blocks: Moderate difficulty  Walking a mile: Moderate difficulty  Going up or down 10 stairs (about 1 flight of stairs): A little bit of difficulty  Standing for 1 hour: A little bit of difficulty  Sitting for 1 hour: Moderate difficulty  Running on even ground: No difficulty  Running on uneven ground: No difficulty  Making sharp turns while running fast: No difficulty  Hopping: No difficulty  Rolling over in bed: A little bit of difficulty  Total: 51      Time Calculation:     Therapy Suggested Charges     Code   Minutes Charges    None                   Therapy Charges for Today     Code Description Service Date Service Provider Modifiers Qty    11582957521 HC PT EVAL LOW COMPLEXITY 4 6/25/2018 Ilene Rivas, PT GP 1          PT G-Codes  Outcome Measure Options: Quick DASH, Lower Extremity Functional Scale (LEFS)         Ilene Rivas, PT  6/25/2018

## 2018-06-29 ENCOUNTER — HOSPITAL ENCOUNTER (OUTPATIENT)
Dept: PHYSICAL THERAPY | Facility: HOSPITAL | Age: 58
Setting detail: THERAPIES SERIES
Discharge: HOME | End: 2018-06-29

## 2018-06-29 DIAGNOSIS — M54.12 CERVICAL RADICULOPATHY: Primary | ICD-10-CM

## 2018-06-29 DIAGNOSIS — M25.562 KNEE PAIN, LEFT ANTERIOR: ICD-10-CM

## 2018-06-29 DIAGNOSIS — M54.2 NECK PAIN ON RIGHT SIDE: ICD-10-CM

## 2018-06-29 PROCEDURE — 97110 THERAPEUTIC EXERCISES: CPT

## 2018-06-29 NOTE — THERAPY TREATMENT NOTE
Outpatient Physical Therapy Ortho Treatment Note  EVANGELISTA Lua     Patient Name: Sherin Azul  : 1960  MRN: [id number]  Today's Date: 2018      Visit Date: 2018    Visit Dx:    ICD-10-CM ICD-9-CM   1. Cervical radiculopathy M54.12 723.4   2. Neck pain on right side M54.2 723.1   3. Knee pain, left anterior M25.562 719.46       Patient Active Problem List   Diagnosis   • Insomnia   • Osteoarthritis, hand   • Current every day smoker   • Dyslipidemia   • Candida infection of genital region   • Gastroesophageal reflux disease   • Anal fissure   • Varicose veins of legs   • Osteoarthritis   • Hx of radiation therapy   • History of MRSA infection   • History of cervical dysplasia   • History of breast cancer   • Herpes   • Eczema   • Psoriasis   • Menopause   • Rectocele        Past Medical History:   Diagnosis Date   • Acrochordon    • Acute maxillary sinusitis    • Acute otitis media    • Breast cancer 2005    left   • Dermatitis    • Drug therapy    • Eczema    • H/O alcohol abuse    • Herpes    • History of breast cancer    • History of cervical dysplasia    • History of MRSA infection    • Hx of radiation therapy    • Joint pain, hip    • Joint pain, knee    • Osteoarthritis    • Psoriasis    • Scabies    • Tendonitis    • Varicose veins of legs    • Viral syndrome    • Weight gain         Past Surgical History:   Procedure Laterality Date   • BREAST BIOPSY Left    • BREAST LUMPECTOMY Left     lumpectomy   • LAPAROSCOPIC ASSISTED VAGINAL HYSTERECTOMY SALPINGO OOPHORECTOMY Bilateral    • OOPHORECTOMY                               PT Assessment/Plan     Row Name 18 0923          PT Assessment    Assessment Comments Pt tolerated today's session and progressions well with no complaints of pain.  Pt. is doing well with HEP and continuing to work light duty, with occasional pain in R shoulder/mid back and L knee.  -AC        PT Plan    PT Plan Comments Continue to  "progress upper quarter and L knee strengthening as tolerated; manual therapy and modalities as necessary.  -AC       User Key  (r) = Recorded By, (t) = Taken By, (c) = Cosigned By    Initials Name Provider Type    KORIN Rivas PT Physical Therapist                    Exercises     Row Name 06/29/18 0923             Subjective Comments    Subjective Comments Pt states she is doing well today and isn't having pain.  Her R knee hurts when she squats and she occasionally gets \"nerve\" pain in R mid-back region while lifting.  -AC         Subjective Pain    Able to rate subjective pain? yes  -AC      Pre-Treatment Pain Level 0  -AC         Total Minutes    00466 - PT Therapeutic Exercise Minutes 32  -AC         Exercise 1    Exercise Name 1 Exercises performed in clinical setting as per flow sheet.  -AC      Time 1 32  -AC        User Key  (r) = Recorded By, (t) = Taken By, (c) = Cosigned By    Initials Name Provider Type    KORIN Rivas PT Physical Therapist                             Therapy Education  Education Details: Reviewed HEP and added banded sweeps and thoracic rotation stretch; pt advised on beneficial machines she could utilize at the [facility]  Given: HEP  Program: Progressed  How Provided: Demonstration, Verbal  Provided to: Patient  Level of Understanding: Demonstrated, Verbalized              Time Calculation:   Start Time: 0923  Therapy Suggested Charges     Code   Minutes Charges    47512 (CPT®) Hc Pt Neuromusc Re Education Ea 15 Min      11590 (CPT®) Hc Pt Ther Proc Ea 15 Min 32 2    71388 (CPT®) Hc Gait Training Ea 15 Min      63514 (CPT®) Hc Pt Therapeutic Act Ea 15 Min      10184 (CPT®) Hc Pt Manual Therapy Ea 15 Min      72752 (CPT®) Hc Pt Ther Massage- Per 15 Min      80275 (CPT®) Hc Pt Iontophoresis Ea 15 Min      78320 (CPT®) Hc Pt Elec Stim Ea-Per 15 Min      75655 (CPT®) Hc Pt Ultrasound Ea 15 Min      03994 (CPT®) Hc Pt Self Care/Mgmt/Train Ea 15 Min      Total  32 2    "     Therapy Charges for Today     Code Description Service Date Service Provider Modifiers Qty    61868851599  PT THER PROC EA 15 MIN 6/29/2018 Ilene Rivas, PT GP 2                    Ilene Rivas, PT  6/29/2018

## 2018-07-09 ENCOUNTER — HOSPITAL ENCOUNTER (OUTPATIENT)
Dept: PHYSICAL THERAPY | Facility: HOSPITAL | Age: 58
Setting detail: THERAPIES SERIES
Discharge: HOME | End: 2018-07-09

## 2018-07-09 DIAGNOSIS — M25.562 KNEE PAIN, LEFT ANTERIOR: ICD-10-CM

## 2018-07-09 DIAGNOSIS — M54.12 CERVICAL RADICULOPATHY: Primary | ICD-10-CM

## 2018-07-09 DIAGNOSIS — M54.2 NECK PAIN ON RIGHT SIDE: ICD-10-CM

## 2018-07-09 PROCEDURE — 97140 MANUAL THERAPY 1/> REGIONS: CPT

## 2018-07-09 PROCEDURE — 97110 THERAPEUTIC EXERCISES: CPT

## 2018-07-09 NOTE — THERAPY TREATMENT NOTE
Outpatient Physical Therapy Ortho Treatment Note  EVANGELISTA Lua     Patient Name: Sherin Azul  : 1960  MRN: [id number]  Today's Date: 2018      Visit Date: 2018    Visit Dx:    ICD-10-CM ICD-9-CM   1. Cervical radiculopathy M54.12 723.4   2. Neck pain on right side M54.2 723.1   3. Knee pain, left anterior M25.562 719.46       Patient Active Problem List   Diagnosis   • Insomnia   • Osteoarthritis, hand   • Current every day smoker   • Dyslipidemia   • Candida infection of genital region   • Gastroesophageal reflux disease   • Anal fissure   • Varicose veins of legs   • Osteoarthritis   • Hx of radiation therapy   • History of MRSA infection   • History of cervical dysplasia   • History of breast cancer   • Herpes   • Eczema   • Psoriasis   • Menopause   • Rectocele        Past Medical History:   Diagnosis Date   • Acrochordon    • Acute maxillary sinusitis    • Acute otitis media    • Breast cancer (CMS/HCC) 2005    left   • Dermatitis    • Drug therapy    • Eczema    • H/O alcohol abuse    • Herpes    • History of breast cancer    • History of cervical dysplasia    • History of MRSA infection    • Hx of radiation therapy    • Joint pain, hip    • Joint pain, knee    • Osteoarthritis    • Psoriasis    • Scabies    • Tendonitis    • Varicose veins of legs    • Viral syndrome    • Weight gain         Past Surgical History:   Procedure Laterality Date   • BREAST BIOPSY Left    • BREAST LUMPECTOMY Left     lumpectomy   • LAPAROSCOPIC ASSISTED VAGINAL HYSTERECTOMY SALPINGO OOPHORECTOMY Bilateral    • OOPHORECTOMY               PT Assessment/Plan     Row Name 18 1645          PT Assessment    Assessment Comments Client reports exercises felt good today and she reported some relief also with ASTYM.   -MM        PT Plan    PT Plan Comments Continue per plan of treatment with manual therapy and exercises.   -MM       User Key  (r) = Recorded By, (t) = Taken By, (c) =  Cosigned By    Initials Name Provider Type    LAW Cardenas, PT Physical Therapist                    Exercises     Row Name 07/09/18 1700 07/09/18 1645          Subjective Comments    Subjective Comments Client reports primary complaint today is tingling/burning sensation right rhomboid/upper thoracic region.  She reports her hands feel great today. Her right thoracic/scapular area often bothers her at the end of a work shift.   -MM  --        Subjective Pain    Able to rate subjective pain? yes  -MM  --     Pre-Treatment Pain Level 3  -MM  --     Post-Treatment Pain Level 1  -MM  --        Total Minutes    54250 - PT Therapeutic Exercise Minutes 15  -MM  --     46041 - PT Manual Therapy Minutes  -- 15  -MM        Exercise 1    Exercise Name 1 Included exercises in clinical setting per flow sheet. UPdated exercises to include: shoulder foam roll series with thoracic and neck stretching.  Also included CTS stretch.   -MM  --     Time 1 15  -MM  --       User Key  (r) = Recorded By, (t) = Taken By, (c) = Cosigned By    Initials Name Provider Type    LAW Cardenas, PT Physical Therapist             Manual Rx (last 36 hours)      Manual Treatments     Row Name 07/09/18 1645             Total Minutes    38959 - PT Manual Therapy Minutes 15  -MM         Manual Rx 1    Manual Rx 1 Location Bilateral upper/mid thoracic region, right scapular region.  -MM      Manual Rx 1 Type Provided soft tissue mobilization using ASTYM technique with moderate pressure. Client was positioned prone. Also included grade 3 PA glides for mid thoracic spine.   -MM      Manual Rx 1 Duration 15  -MM        User Key  (r) = Recorded By, (t) = Taken By, (c) = Cosigned By    Initials Name Provider Type    LAW Cardenas, PT Physical Therapist          Time Calculation:   Start Time: 1645  Therapy Suggested Charges     Code   Minutes Charges    26350 (CPT®) Hc Pt Neuromusc Re Education Ea 15 Min      66379 (CPT®) Hc Pt Ther Proc Ea  15 Min 15 1    93922 (CPT®) Hc Gait Training Ea 15 Min      56620 (CPT®) Hc Pt Therapeutic Act Ea 15 Min      96876 (CPT®) Hc Pt Manual Therapy Ea 15 Min 15 1    52434 (CPT®) Hc Pt Ther Massage- Per 15 Min      28086 (CPT®) Hc Pt Iontophoresis Ea 15 Min      04700 (CPT®) Hc Pt Elec Stim Ea-Per 15 Min      43629 (CPT®) Hc Pt Ultrasound Ea 15 Min      89014 (CPT®) Hc Pt Self Care/Mgmt/Train Ea 15 Min      Total  30 2        Therapy Charges for Today     Code Description Service Date Service Provider Modifiers Qty    43474324978 HC PT THER PROC EA 15 MIN 7/9/2018 Silvano Cardenas, PT GP 1    01098301661 HC PT MANUAL THERAPY EA 15 MIN 7/9/2018 Silvano Cardenas, PT GP 1                    Silvano Cardenas, PT  7/9/2018

## 2018-07-18 ENCOUNTER — HOSPITAL ENCOUNTER (OUTPATIENT)
Dept: PHYSICAL THERAPY | Facility: HOSPITAL | Age: 58
Setting detail: THERAPIES SERIES
Discharge: HOME | End: 2018-07-18

## 2018-07-18 DIAGNOSIS — M54.2 NECK PAIN ON RIGHT SIDE: ICD-10-CM

## 2018-07-18 DIAGNOSIS — M54.12 CERVICAL RADICULOPATHY: Primary | ICD-10-CM

## 2018-07-18 PROCEDURE — 97110 THERAPEUTIC EXERCISES: CPT

## 2018-07-18 PROCEDURE — 97140 MANUAL THERAPY 1/> REGIONS: CPT

## 2018-07-18 NOTE — THERAPY TREATMENT NOTE
Outpatient Physical Therapy Ortho Treatment Note   [facility]     Patient Name: Sherin Azul  : 1960  MRN: [id number]  Today's Date: 2018      Visit Date: 2018    Visit Dx:    ICD-10-CM ICD-9-CM   1. Cervical radiculopathy M54.12 723.4   2. Neck pain on right side M54.2 723.1       Patient Active Problem List   Diagnosis   • Insomnia   • Osteoarthritis, hand   • Current every day smoker   • Dyslipidemia   • Candida infection of genital region   • Gastroesophageal reflux disease   • Anal fissure   • Varicose veins of legs   • Osteoarthritis   • Hx of radiation therapy   • History of MRSA infection   • History of cervical dysplasia   • History of breast cancer   • Herpes   • Eczema   • Psoriasis   • Menopause   • Rectocele        Past Medical History:   Diagnosis Date   • Acrochordon    • Acute maxillary sinusitis    • Acute otitis media    • Breast cancer (CMS/[facility]) 2005    left   • Dermatitis    • Drug therapy    • Eczema    • H/O alcohol abuse    • Herpes    • History of breast cancer    • History of cervical dysplasia    • History of MRSA infection    • Hx of radiation therapy    • Joint pain, hip    • Joint pain, knee    • Osteoarthritis    • Psoriasis    • Scabies    • Tendonitis    • Varicose veins of legs    • Viral syndrome    • Weight gain         Past Surgical History:   Procedure Laterality Date   • BREAST BIOPSY Left    • BREAST LUMPECTOMY Left     lumpectomy   • LAPAROSCOPIC ASSISTED VAGINAL HYSTERECTOMY SALPINGO OOPHORECTOMY Bilateral    • OOPHORECTOMY                 PT Assessment/Plan     Row Name 18 1600          PT Assessment    Assessment Comments Pain is improving. Exercises were progressed and tolerated without complaints.   -MM        PT Plan    PT Plan Comments Continue per plan of treatment with manual therapy and exercises as needed.   -MM       User Key  (r) = Recorded By, (t) = Taken By, (c) = Cosigned By    Initials Name Provider Type     LAW Cardenas, PT Physical Therapist                    Exercises     Row Name 07/18/18 1600             Subjective Comments    Subjective Comments Client reports no pain today. She has some mid/trap rhomboid soreness, but feels much better overall.   -MM         Subjective Pain    Able to rate subjective pain? yes  -MM      Pre-Treatment Pain Level 0  -MM      Post-Treatment Pain Level 0  -MM         Total Minutes    17738 - PT Therapeutic Exercise Minutes 15  -MM      63278 - PT Manual Therapy Minutes 15  -MM         Exercise 1    Exercise Name 1 Updated exercises to include Rows with band, sweeps with band, reverse flies with band, horizontal abduction with band. Progressed to blue band resistance. Also included UBE and foam roll thoracic stretches.   -MM      Cueing 1 Verbal  -MM      Time 1 15  -MM        User Key  (r) = Recorded By, (t) = Taken By, (c) = Cosigned By    Initials Name Provider Type    LAW aCrdenas, PT Physical Therapist             Manual Rx (last 36 hours)      Manual Treatments     Row Name 07/18/18 1600             Total Minutes    12883 - PT Manual Therapy Minutes 15  -MM         Manual Rx 1    Manual Rx 1 Location Bilateral upper/mid thoracic region, right scapular region.  -MM      Manual Rx 1 Type Provided soft tissue mobilization using ASTYM technique with moderate pressure. Client was positioned prone. Also included grade 3 PA glides for mid thoracic spine.   -MM      Manual Rx 1 Duration 15  -MM        User Key  (r) = Recorded By, (t) = Taken By, (c) = Cosigned By    Initials Name Provider Type    LAW Cardenas, PT Physical Therapist        Time Calculation:   Start Time: 1600  Therapy Suggested Charges     Code   Minutes Charges    71022 (CPT®) Hc Pt Neuromusc Re Education Ea 15 Min      02467 (CPT®) Hc Pt Ther Proc Ea 15 Min 15 1    96866 (CPT®) Hc Gait Training Ea 15 Min      94024 (CPT®) Hc Pt Therapeutic Act Ea 15 Min      04341 (CPT®) Hc Pt Manual Therapy Ea 15  Min 15 1    09385 (CPT®) Hc Pt Ther Massage- Per 15 Min      54136 (CPT®) Hc Pt Iontophoresis Ea 15 Min      81697 (CPT®) Hc Pt Elec Stim Ea-Per 15 Min      35735 (CPT®) Hc Pt Ultrasound Ea 15 Min      75521 (CPT®) Hc Pt Self Care/Mgmt/Train Ea 15 Min      Total  30 2        Therapy Charges for Today     Code Description Service Date Service Provider Modifiers Qty    40186245138 HC PT THER PROC EA 15 MIN 7/18/2018 Silvano Cardenas, PT GP 1    56704817736 HC PT MANUAL THERAPY EA 15 MIN 7/18/2018 Silvano Cardenas, PT GP 1                    Silvano Cardenas, PT  7/18/2018

## 2018-07-26 ENCOUNTER — HOSPITAL ENCOUNTER (OUTPATIENT)
Dept: PHYSICAL THERAPY | Facility: HOSPITAL | Age: 58
Discharge: HOME | End: 2018-07-26

## 2018-07-26 DIAGNOSIS — M54.12 CERVICAL RADICULOPATHY: Primary | ICD-10-CM

## 2018-07-26 DIAGNOSIS — M25.562 KNEE PAIN, LEFT ANTERIOR: ICD-10-CM

## 2018-07-26 DIAGNOSIS — M54.2 NECK PAIN ON RIGHT SIDE: ICD-10-CM

## 2018-07-26 NOTE — THERAPY DISCHARGE NOTE
Outpatient Physical Therapy Ortho  Discharge Summary   [facility]     Patient Name: Sherin Azul  : 1960  MRN: [id number]  Today's Date: 2018      Visit Date: 2018    Visit Dx:    ICD-10-CM ICD-9-CM   1. Cervical radiculopathy M54.12 723.4   2. Neck pain on right side M54.2 723.1   3. Knee pain, left anterior M25.562 719.46       Patient Active Problem List   Diagnosis   • Insomnia   • Osteoarthritis, hand   • Current every day smoker   • Dyslipidemia   • Candida infection of genital region   • Gastroesophageal reflux disease   • Anal fissure   • Varicose veins of legs   • Osteoarthritis   • Hx of radiation therapy   • History of MRSA infection   • History of cervical dysplasia   • History of breast cancer   • Herpes   • Eczema   • Psoriasis   • Menopause   • Rectocele        Past Medical History:   Diagnosis Date   • Acrochordon    • Acute maxillary sinusitis    • Acute otitis media    • Breast cancer (CMS/HCC) 2005    left   • Dermatitis    • Drug therapy    • Eczema    • H/O alcohol abuse    • Herpes    • History of breast cancer    • History of cervical dysplasia    • History of MRSA infection    • Hx of radiation therapy    • Joint pain, hip    • Joint pain, knee    • Osteoarthritis    • Psoriasis    • Scabies    • Tendonitis    • Varicose veins of legs    • Viral syndrome    • Weight gain         Past Surgical History:   Procedure Laterality Date   • BREAST BIOPSY Left    • BREAST LUMPECTOMY Left     lumpectomy   • LAPAROSCOPIC ASSISTED VAGINAL HYSTERECTOMY SALPINGO OOPHORECTOMY Bilateral    • OOPHORECTOMY             PT OP Goals     Row Name 18 1000          PT Short Term Goals    STG Date to Achieve 18  -AC     STG 1 Independently perform HEP including upper quarter strengthening/stability and neural glides to self-reduce radicular symptoms.  -AC     STG 1 Progress Met  -AC     STG 2 Report a decrease in R-sided neck/shoulder pain by 50%.  -AC      STG 2 Progress Not Met  -AC     STG 3 Be able to perform work duties with decrease in pain to < 5/10.  -AC     STG 3 Progress Not Met  -AC        Long Term Goals    LTG Date to Achieve 08/06/18  -AC     LTG 1 Reduce QuickDASH score to < or = 45%.  -AC     LTG 1 Progress Not Met  -AC     LTG 2 Reduce LEFS score to < or = 30%.  -AC     LTG 2 Progress Not Met  -AC     LTG 3 Be able to perform full-duty work activities (lifting, chopping) with minimal- no UE pain.  -AC     LTG 3 Progress Not Met  -AC       User Key  (r) = Recorded By, (t) = Taken By, (c) = Cosigned By    Initials Name Provider Type    KORIN Rivas, PT Physical Therapist        Time Calculation:   Start Time: 1000  Therapy Suggested Charges     Code   Minutes Charges    None           OP PT Discharge Summary  Date of Discharge: 07/26/18  Reason for Discharge: Per MD order, other (comment) ( released pt to return to work)  Outcomes Achieved: Patient able to partially acheive established goals (Pt reported complaince with HEP previously; unable to assess other goals)      Ilene Rivas, PT  7/26/2018

## 2018-09-13 ENCOUNTER — OFFICE VISIT (OUTPATIENT)
Dept: INTERNAL MEDICINE | Facility: CLINIC | Age: 58
End: 2018-09-13

## 2018-09-13 VITALS
SYSTOLIC BLOOD PRESSURE: 118 MMHG | BODY MASS INDEX: 27.49 KG/M2 | WEIGHT: 165.2 LBS | OXYGEN SATURATION: 97 % | HEART RATE: 70 BPM | DIASTOLIC BLOOD PRESSURE: 70 MMHG

## 2018-09-13 DIAGNOSIS — M79.642 PAIN IN BOTH HANDS: ICD-10-CM

## 2018-09-13 DIAGNOSIS — M79.641 PAIN IN BOTH HANDS: ICD-10-CM

## 2018-09-13 DIAGNOSIS — Z12.11 SCREENING FOR MALIGNANT NEOPLASM OF COLON: Primary | ICD-10-CM

## 2018-09-13 PROCEDURE — 99213 OFFICE O/P EST LOW 20 MIN: CPT | Performed by: NURSE PRACTITIONER

## 2018-09-13 RX ORDER — TACROLIMUS 1 MG/G
OINTMENT TOPICAL 2 TIMES DAILY
COMMUNITY
End: 2020-02-14

## 2018-09-13 RX ORDER — FOLIC ACID 0.8 MG
TABLET ORAL
COMMUNITY
End: 2020-02-14

## 2018-09-13 RX ORDER — FLUCONAZOLE 100 MG/1
300 TABLET ORAL DAILY
COMMUNITY
End: 2019-04-05

## 2018-09-13 RX ORDER — BETAMETHASONE DIPROPIONATE 0.05 %
OINTMENT (GRAM) TOPICAL 2 TIMES DAILY
COMMUNITY
End: 2020-02-14

## 2018-09-13 NOTE — PROGRESS NOTES
Subjective   Sherin Azul is a 58 y.o. female.   Chief Complaint   Patient presents with   • Follow-up     Patient is doing a clinical trial and needs a form filled out, Needs a Colonscopy schedule   • Pain     Pain in Knees and Hands       History of Present Illness has lesion to right side of forehead almost in hairline.  Onset 2 months.  It has gotten bigger.  Hasn't changed in color.  Believes she has had sunburn previously.  Wants to participate in clinical trial for arthritis.  Has chronic pain in hands and knees.  Aggravated by her job as a .  Takes diclofenac sometimes 4x daily for pain.  Patient denies fever chills, headache, ear pain, sore throat, shortness of air, cough, chest pain, abdominal pain, nausea vomiting diarrhea, dysuria, blood in stool or urine. Mood is good.  Eating and drinking as usual.    The following portions of the patient's history were reviewed and updated as appropriate: allergies, current medications, past family history, past medical history, past social history, past surgical history and problem list.    Current Outpatient Prescriptions:   •  betamethasone dipropionate (DIPROLENE) 0.05 % ointment, Apply  topically to the appropriate area as directed 2 (Two) Times a Day., Disp: , Rfl:   •  Calcium Citrate (CITRACAL PO), Take  by mouth., Disp: , Rfl:   •  CBD (cannabidiol) oral oil, Take  by mouth., Disp: , Rfl:   •  DICLOFENAC PO, Take 75 mg by mouth 2 (Two) Times a Day., Disp: , Rfl:   •  docusate sodium (STOOL SOFTENER) 100 MG capsule, Take  by mouth., Disp: , Rfl:   •  glucosamine sulfate 500 MG capsule capsule, Take  by mouth 2 (Two) Times a Day With Meals., Disp: , Rfl:   •  Magnesium 500 MG capsule, Take  by mouth., Disp: , Rfl:   •  omeprazole (priLOSEC) 20 MG capsule, One po qd, Disp: 30 capsule, Rfl: 11  •  tacrolimus (PROTOPIC) 0.1 % ointment, Apply  topically to the appropriate area as directed 2 (Two) Times a Day., Disp: , Rfl:   •  fluconazole (DIFLUCAN)  100 MG tablet, Take 300 mg by mouth Daily., Disp: , Rfl:   •  ibuprofen (ADVIL,MOTRIN) 600 MG tablet, Take 1 tablet by mouth As Needed., Disp: , Rfl:   •  MAGNESIUM-CALCIUM-FOLIC ACID PO, Take  by mouth., Disp: , Rfl:   •  naproxen sodium (ALEVE) 220 MG tablet, Take 1 tablet by mouth Every 8 (Eight) Hours., Disp: , Rfl:     Review of Systems Consitutional, HEENT, Respiratory, CV, GI, , Skin, Musculoskeletal, Neuro-mental, Endocrinological, Hematological were reviewed.  Positives were discussed in the HPI, otherwise ROS was negative   /70   Pulse 70   Wt 74.9 kg (165 lb 3.2 oz)   SpO2 97%   BMI 27.49 kg/m²     Objective   No Known Allergies    Physical Exam   Constitutional: She is oriented to person, place, and time. She appears well-developed and well-nourished. No distress.   HENT:   Head: Normocephalic.   Cardiovascular: Normal rate, regular rhythm, normal heart sounds and intact distal pulses.  Exam reveals no gallop and no friction rub.    No murmur heard.  Pulmonary/Chest: Effort normal and breath sounds normal. No respiratory distress.   Abdominal: Soft. Bowel sounds are normal.   Musculoskeletal:   Hands have arthritic changes in fingers and knuckles.  Knees nontender with palpation.  Full range of motion   Neurological: She is alert and oriented to person, place, and time.   Skin: Skin is warm and dry.   Is pink, no rash.  Is approximately 2-3 mm flesh-colored raised area to right side of forehead adjacent to the hairline.  The edges appear smooth.  The center is flaky   Psychiatric: She has a normal mood and affect. Her behavior is normal. Thought content normal.   Nursing note and vitals reviewed.      Procedures    Assessment/Plan   Sherin was seen today for follow-up and pain.    Diagnoses and all orders for this visit:    Screening for malignant neoplasm of colon  -     Ambulatory Referral to Gastroenterology    Pain in both hands  -     Ambulatory Referral to Orthopedic  Surgery        Patient Instructions   I have updated her medication list.  Final discharge she stick with 1 and said that helps her pain the past which is the diclofenac.  I have explained to her taking more anti-inflammatories than prescribed can lead to kidney irritation.  Recommend she take a maximum of 2000 mg of Tylenol per day in divided doses.  Referral to hand surgery for possible joint injections.  Referral to GI for colonoscopy.  I have discussed with patient I do not know if she will be allowed to participate in the clinical trial for arthritis if she has had joint injections.  Patient is agreeable not to participate in the clinical trial if it means she cannot have an injection.  She also is recommended to try splints at night        ROGERIO Mercado

## 2018-09-13 NOTE — PATIENT INSTRUCTIONS
I have updated her medication list.  Final discharge she stick with 1 and said that helps her pain the past which is the diclofenac.  I have explained to her taking more anti-inflammatories than prescribed can lead to kidney irritation.  Recommend she take a maximum of 2000 mg of Tylenol per day in divided doses.  Referral to hand surgery for possible joint injections.  Referral to GI for colonoscopy.  I have discussed with patient I do not know if she will be allowed to participate in the clinical trial for arthritis if she has had joint injections.  Patient is agreeable not to participate in the clinical trial if it means she cannot have an injection.  She also is recommended to try splints at night

## 2018-09-24 ENCOUNTER — TELEPHONE (OUTPATIENT)
Dept: INTERNAL MEDICINE | Facility: CLINIC | Age: 58
End: 2018-09-24

## 2018-10-16 ENCOUNTER — HOSPITAL ENCOUNTER (OUTPATIENT)
Dept: MAMMOGRAPHY | Facility: HOSPITAL | Age: 58
Discharge: HOME | End: 2018-10-16
Attending: INTERNAL MEDICINE

## 2018-10-16 DIAGNOSIS — Z12.31 VISIT FOR SCREENING MAMMOGRAM: ICD-10-CM

## 2018-10-16 PROCEDURE — 77063 BREAST TOMOSYNTHESIS BI: CPT | Performed by: RADIOLOGY

## 2018-10-16 PROCEDURE — 77063 BREAST TOMOSYNTHESIS BI: CPT

## 2018-10-16 PROCEDURE — 77067 SCR MAMMO BI INCL CAD: CPT | Performed by: RADIOLOGY

## 2018-10-16 PROCEDURE — 77067 SCR MAMMO BI INCL CAD: CPT

## 2018-10-24 DIAGNOSIS — Z12.11 SCREENING FOR COLON CANCER: Primary | ICD-10-CM

## 2019-04-03 ENCOUNTER — TELEPHONE (OUTPATIENT)
Dept: INTERNAL MEDICINE | Facility: CLINIC | Age: 59
End: 2019-04-03

## 2019-04-05 ENCOUNTER — OFFICE VISIT (OUTPATIENT)
Dept: INTERNAL MEDICINE | Facility: CLINIC | Age: 59
End: 2019-04-05

## 2019-04-05 VITALS
DIASTOLIC BLOOD PRESSURE: 82 MMHG | SYSTOLIC BLOOD PRESSURE: 136 MMHG | HEIGHT: 64 IN | HEART RATE: 68 BPM | WEIGHT: 164.4 LBS | BODY MASS INDEX: 28.07 KG/M2 | OXYGEN SATURATION: 98 %

## 2019-04-05 DIAGNOSIS — M79.641 PAIN IN BOTH HANDS: Primary | ICD-10-CM

## 2019-04-05 DIAGNOSIS — M79.642 PAIN IN BOTH HANDS: Primary | ICD-10-CM

## 2019-04-05 PROCEDURE — 99213 OFFICE O/P EST LOW 20 MIN: CPT | Performed by: NURSE PRACTITIONER

## 2019-04-05 RX ORDER — TRAMADOL HYDROCHLORIDE 50 MG/1
50 TABLET ORAL EVERY 6 HOURS PRN
Qty: 30 TABLET | Refills: 0 | Status: SHIPPED | OUTPATIENT
Start: 2019-04-05 | End: 2019-07-11 | Stop reason: SDUPTHER

## 2019-04-05 NOTE — PROGRESS NOTES
Subjective   Sherin Azul is a 58 y.o. female.   Chief Complaint   Patient presents with   • Arthritis     has had cortisone injections, wondering if she could have script for ultram for pain also      History of Present Illness Cont to have hand pain.  Sees Dr Poe for injectiosn.  She uses Tyle ES and diclofenac without relief..  She is trying to get a different job.  She reports the pain is constant and is worse with repetitive motion.  She says when she gets the hand injections will help take the edge off her discomfort.  She would like to have something that she can use as needed in addition to the Tylenol and diclofenac    The following portions of the patient's history were reviewed and updated as appropriate: allergies, current medications, past family history, past medical history, past social history, past surgical history and problem list.    Current Outpatient Medications:   •  Acetaminophen (TYLENOL ARTHRITIS EXT RELIEF PO), Take 2 tablets by mouth 3 (Three) Times a Day., Disp: , Rfl:   •  betamethasone dipropionate (DIPROLENE) 0.05 % ointment, Apply  topically to the appropriate area as directed 2 (Two) Times a Day., Disp: , Rfl:   •  Calcium Citrate (CITRACAL PO), Take  by mouth., Disp: , Rfl:   •  DICLOFENAC PO, Take 75 mg by mouth 2 (Two) Times a Day., Disp: , Rfl:   •  docusate sodium (STOOL SOFTENER) 100 MG capsule, Take  by mouth., Disp: , Rfl:   •  glucosamine sulfate 500 MG capsule capsule, Take  by mouth 2 (Two) Times a Day With Meals., Disp: , Rfl:   •  Magnesium 500 MG capsule, Take  by mouth., Disp: , Rfl:   •  omeprazole (priLOSEC) 20 MG capsule, One po qd, Disp: 30 capsule, Rfl: 11  •  tacrolimus (PROTOPIC) 0.1 % ointment, Apply  topically to the appropriate area as directed 2 (Two) Times a Day., Disp: , Rfl:   •  Sod Picosulfate-Mag Ox-Cit Acd 10-3.5-12 MG-GM -GM/160ML solution, Take 1 kit by mouth Take As Directed. Follow instructions that were mailed to your home. If you  "didn't receive these call [phone]., Disp: 2 bottle, Rfl: 0  •  traMADol (ULTRAM) 50 MG tablet, Take 1 tablet by mouth Every 6 (Six) Hours As Needed for Moderate Pain ., Disp: 30 tablet, Rfl: 0    Review of Systems Consitutional, HEENT, Respiratory, CV, GI, , Skin, Musculoskeletal, Neuro-mental, Endocrinological, Hematological were reviewed.  Positives were discussed in the HPI, otherwise ROS was negative   /82   Pulse 68   Ht 162.6 cm (64\")   Wt 74.6 kg (164 lb 6.4 oz)   SpO2 98% Comment: ra  BMI 28.22 kg/m²     Objective   No Known Allergies    Physical Exam   Constitutional: She is oriented to person, place, and time. She appears well-developed and well-nourished. No distress.   HENT:   Head: Normocephalic.   Musculoskeletal:   Hands have nodules at the DIP and MIP joints.  Some swelling.  Has strong radial and ulnar pulses.   Neurological: She is alert and oriented to person, place, and time.   Skin: Skin is warm and dry. Capillary refill takes less than 2 seconds.   Nursing note and vitals reviewed.      Procedures      Assessment/Plan   Sherin was seen today for arthritis.    Diagnoses and all orders for this visit:    Pain in both hands  -     traMADol (ULTRAM) 50 MG tablet; Take 1 tablet by mouth Every 6 (Six) Hours As Needed for Moderate Pain .        Patient Instructions   Controlled substance agreement was explained and signed.  Tr is appropriate.  Tr [id number] is on chart.  use tramadol sparingly.  Remember to use moist heat, Tylenol and your diclofenac as your go to medications.  Pt verbalizes understanding and agreement with plan of care.   EMR Dragon/transcription disclaimer:  Please note that portions of this note were completed with a voice recognition program.  Electronic transcription of the voice recognition program may permit erroneous words or phrases to be inadvertently transcribed.  Although I have reviewed the note for such errors, some may still exist in this " documentation       Heather Rush, APRN

## 2019-04-08 NOTE — PATIENT INSTRUCTIONS
Controlled substance agreement was explained and signed.  Tr is appropriate.  Tr [id number] is on chart.  use tramadol sparingly.  Remember to use moist heat, Tylenol and your diclofenac as your go to medications.  Pt verbalizes understanding and agreement with plan of care.

## 2019-07-11 ENCOUNTER — OFFICE VISIT (OUTPATIENT)
Dept: INTERNAL MEDICINE | Facility: CLINIC | Age: 59
End: 2019-07-11

## 2019-07-11 ENCOUNTER — OFFICE VISIT (OUTPATIENT)
Dept: OBSTETRICS AND GYNECOLOGY | Facility: CLINIC | Age: 59
End: 2019-07-11

## 2019-07-11 ENCOUNTER — LAB (OUTPATIENT)
Dept: LAB | Facility: HOSPITAL | Age: 59
End: 2019-07-11

## 2019-07-11 VITALS
TEMPERATURE: 97.7 F | WEIGHT: 164 LBS | BODY MASS INDEX: 28 KG/M2 | HEART RATE: 67 BPM | SYSTOLIC BLOOD PRESSURE: 124 MMHG | OXYGEN SATURATION: 94 % | DIASTOLIC BLOOD PRESSURE: 80 MMHG | HEIGHT: 64 IN

## 2019-07-11 VITALS — BODY MASS INDEX: 28.22 KG/M2 | HEIGHT: 64 IN | DIASTOLIC BLOOD PRESSURE: 68 MMHG | SYSTOLIC BLOOD PRESSURE: 112 MMHG

## 2019-07-11 DIAGNOSIS — N64.3 GALACTORRHEA NOT ASSOCIATED WITH CHILDBIRTH: ICD-10-CM

## 2019-07-11 DIAGNOSIS — M79.642 PAIN IN BOTH HANDS: ICD-10-CM

## 2019-07-11 DIAGNOSIS — N81.6 RECTOCELE: ICD-10-CM

## 2019-07-11 DIAGNOSIS — Z01.411 ENCOUNTER FOR GYNECOLOGICAL EXAMINATION WITH ABNORMAL FINDING: ICD-10-CM

## 2019-07-11 DIAGNOSIS — N64.4 MASTODYNIA: ICD-10-CM

## 2019-07-11 DIAGNOSIS — R53.83 OTHER FATIGUE: ICD-10-CM

## 2019-07-11 DIAGNOSIS — N60.12 FIBROCYSTIC BREAST CHANGES, BILATERAL: ICD-10-CM

## 2019-07-11 DIAGNOSIS — M79.641 PAIN IN BOTH HANDS: ICD-10-CM

## 2019-07-11 DIAGNOSIS — R53.83 TIRED: Primary | ICD-10-CM

## 2019-07-11 DIAGNOSIS — N60.11 FIBROCYSTIC BREAST CHANGES, BILATERAL: ICD-10-CM

## 2019-07-11 DIAGNOSIS — N64.4 MASTODYNIA: Primary | ICD-10-CM

## 2019-07-11 DIAGNOSIS — E78.5 DYSLIPIDEMIA: ICD-10-CM

## 2019-07-11 DIAGNOSIS — Z78.0 MENOPAUSE: Primary | ICD-10-CM

## 2019-07-11 PROBLEM — M25.559 ARTHRALGIA OF HIP: Status: ACTIVE | Noted: 2019-07-11

## 2019-07-11 PROBLEM — M25.569 KNEE PAIN: Status: ACTIVE | Noted: 2019-07-11

## 2019-07-11 PROBLEM — F10.11 HISTORY OF ALCOHOL ABUSE: Status: ACTIVE | Noted: 2019-07-11

## 2019-07-11 PROBLEM — B86 INFESTATION BY SARCOPTES SCABIEI: Status: ACTIVE | Noted: 2019-07-11

## 2019-07-11 LAB
25(OH)D3 SERPL-MCNC: 37.4 NG/ML (ref 30–100)
ALBUMIN SERPL-MCNC: 4.5 G/DL (ref 3.5–5.2)
ALBUMIN/GLOB SERPL: 1.6 G/DL
ALP SERPL-CCNC: 83 U/L (ref 39–117)
ALT SERPL W P-5'-P-CCNC: 22 U/L (ref 1–33)
ANION GAP SERPL CALCULATED.3IONS-SCNC: 12.2 MMOL/L (ref 5–15)
AST SERPL-CCNC: 16 U/L (ref 1–32)
BASOPHILS # BLD AUTO: 0.04 10*3/MM3 (ref 0–0.2)
BASOPHILS NFR BLD AUTO: 0.5 % (ref 0–1.5)
BILIRUB SERPL-MCNC: 0.4 MG/DL (ref 0.2–1.2)
BUN BLD-MCNC: 20 MG/DL (ref 6–20)
BUN/CREAT SERPL: 36.4 (ref 7–25)
CALCIUM SPEC-SCNC: 9.6 MG/DL (ref 8.6–10.5)
CHLORIDE SERPL-SCNC: 104 MMOL/L (ref 98–107)
CHOLEST SERPL-MCNC: 219 MG/DL (ref 0–200)
CO2 SERPL-SCNC: 23.8 MMOL/L (ref 22–29)
CREAT BLD-MCNC: 0.55 MG/DL (ref 0.57–1)
DEPRECATED RDW RBC AUTO: 46.5 FL (ref 37–54)
EOSINOPHIL # BLD AUTO: 0.17 10*3/MM3 (ref 0–0.4)
EOSINOPHIL NFR BLD AUTO: 1.9 % (ref 0.3–6.2)
ERYTHROCYTE [DISTWIDTH] IN BLOOD BY AUTOMATED COUNT: 13 % (ref 12.3–15.4)
GFR SERPL CREATININE-BSD FRML MDRD: 114 ML/MIN/1.73
GLOBULIN UR ELPH-MCNC: 2.9 GM/DL
GLUCOSE BLD-MCNC: 88 MG/DL (ref 65–99)
HCT VFR BLD AUTO: 42.7 % (ref 34–46.6)
HDLC SERPL-MCNC: 58 MG/DL (ref 40–60)
HGB BLD-MCNC: 13.7 G/DL (ref 12–15.9)
IMM GRANULOCYTES # BLD AUTO: 0.09 10*3/MM3 (ref 0–0.05)
IMM GRANULOCYTES NFR BLD AUTO: 1 % (ref 0–0.5)
LDLC SERPL CALC-MCNC: 144 MG/DL (ref 0–100)
LDLC/HDLC SERPL: 2.48 {RATIO}
LYMPHOCYTES # BLD AUTO: 2.68 10*3/MM3 (ref 0.7–3.1)
LYMPHOCYTES NFR BLD AUTO: 30.5 % (ref 19.6–45.3)
MCH RBC QN AUTO: 31.2 PG (ref 26.6–33)
MCHC RBC AUTO-ENTMCNC: 32.1 G/DL (ref 31.5–35.7)
MCV RBC AUTO: 97.3 FL (ref 79–97)
MONOCYTES # BLD AUTO: 0.73 10*3/MM3 (ref 0.1–0.9)
MONOCYTES NFR BLD AUTO: 8.3 % (ref 5–12)
NEUTROPHILS # BLD AUTO: 5.09 10*3/MM3 (ref 1.7–7)
NEUTROPHILS NFR BLD AUTO: 57.8 % (ref 42.7–76)
NRBC BLD AUTO-RTO: 0 /100 WBC (ref 0–0.2)
PLATELET # BLD AUTO: 276 10*3/MM3 (ref 140–450)
PMV BLD AUTO: 8.4 FL (ref 6–12)
POTASSIUM BLD-SCNC: 4.3 MMOL/L (ref 3.5–5.2)
PROLACTIN SERPL-MCNC: 22.8 NG/ML (ref 4.79–23.3)
PROT SERPL-MCNC: 7.4 G/DL (ref 6–8.5)
RBC # BLD AUTO: 4.39 10*6/MM3 (ref 3.77–5.28)
SODIUM BLD-SCNC: 140 MMOL/L (ref 136–145)
TRIGL SERPL-MCNC: 85 MG/DL (ref 0–150)
VLDLC SERPL-MCNC: 17 MG/DL (ref 5–40)
WBC NRBC COR # BLD: 8.8 10*3/MM3 (ref 3.4–10.8)

## 2019-07-11 PROCEDURE — 36415 COLL VENOUS BLD VENIPUNCTURE: CPT | Performed by: NURSE PRACTITIONER

## 2019-07-11 PROCEDURE — 80053 COMPREHEN METABOLIC PANEL: CPT | Performed by: NURSE PRACTITIONER

## 2019-07-11 PROCEDURE — 99214 OFFICE O/P EST MOD 30 MIN: CPT | Performed by: NURSE PRACTITIONER

## 2019-07-11 PROCEDURE — 85025 COMPLETE CBC W/AUTO DIFF WBC: CPT | Performed by: NURSE PRACTITIONER

## 2019-07-11 PROCEDURE — 80061 LIPID PANEL: CPT | Performed by: NURSE PRACTITIONER

## 2019-07-11 PROCEDURE — 99396 PREV VISIT EST AGE 40-64: CPT | Performed by: OBSTETRICS & GYNECOLOGY

## 2019-07-11 PROCEDURE — 86431 RHEUMATOID FACTOR QUANT: CPT | Performed by: NURSE PRACTITIONER

## 2019-07-11 PROCEDURE — 82306 VITAMIN D 25 HYDROXY: CPT

## 2019-07-11 PROCEDURE — 84146 ASSAY OF PROLACTIN: CPT

## 2019-07-11 RX ORDER — TRAMADOL HYDROCHLORIDE 50 MG/1
50 TABLET ORAL EVERY 6 HOURS PRN
Qty: 30 TABLET | Refills: 0 | Status: SHIPPED | OUTPATIENT
Start: 2019-07-11 | End: 2020-06-11

## 2019-07-11 RX ORDER — DICLOFENAC 35 MG/1
75 CAPSULE ORAL 2 TIMES DAILY
Qty: 90 CAPSULE | Refills: 0 | Status: CANCELLED | OUTPATIENT
Start: 2019-07-11

## 2019-07-11 RX ORDER — DICLOFENAC SODIUM 75 MG/1
75 TABLET, DELAYED RELEASE ORAL 2 TIMES DAILY
Qty: 60 TABLET | Refills: 2 | Status: SHIPPED | OUTPATIENT
Start: 2019-07-11 | End: 2020-02-14

## 2019-07-11 NOTE — PROGRESS NOTES
Chief Complaint   Patient presents with   • Gynecologic Exam     rectocele   • Breast Problem     c/o right breast pain, galactorrhea starting 19.  patient states that both breasts are tender now.       Sherin Azul is a 59 y.o. year old  presenting to be seen for her annual exam.  This patient presents today for her annual visit however she has several complaints.  She has a one-week history of bilateral breast pain along with expressible galactorrhea from the right breast 1 week ago.  She has a personal history of carcinoma of the left breast with a prior left lumpectomy.  She does use caffeine and chocolate.  She takes vitamin E, 400 IUs by mouth daily.  She also has complaints of fatigue and lethargy.  She states that she has not felt well for the past 7 to 10 days.  She denies fever, chills, and sweats.  She denies abdominal or pelvic pain.  She has previously had an LAVH/BSO/VCS.  She has complaints of rectal pressure and is known to have a rectocele.  She has had no evidence of a cystocele and has no urinary symptoms.  She denies chronic constipation.  She does heavy lifting at the hospital.  She often has to strain to left.  SCREENING TESTS    Year 2012 2015 2016 2017 2018 2019 2020 [id number]   Age                         PAP       Neg.                  HPV high risk                         Mammogram       benign                  KAYLYN score                         Breast MRI                         Lipids                         Vitamin D                         Colonoscopy                         DEXA  Frax (hip/any)                         Ovarian Screen                             She exercises regularly: yes.  She wears her seat belt: yes.  She has concerns about domestic violence: no.  She has noticed changes in height: no    GYN screening history:  · Last pap: was done on approximately 2018 and the result was:  normal PAP.  · Last mammogram: was done on approximately 10/16/2018 and the result was: Birads II (Benign findings)..    No Additional Complaints Reported    The following portions of the patient's history were reviewed and updated as appropriate:vital signs and   She  has a past medical history of Acrochordon, Acute maxillary sinusitis, Acute otitis media, Breast cancer (CMS/HCC) (2005), Dermatitis, Drug therapy (2005), Eczema, H/O alcohol abuse, Herpes, History of breast cancer, History of cervical dysplasia, History of MRSA infection, radiation therapy (2005), Joint pain, hip, Joint pain, knee, Osteoarthritis, Psoriasis, Scabies, Tendonitis, Varicose veins of legs, Viral syndrome, and Weight gain.  She does not have any pertinent problems on file.  She  has a past surgical history that includes Breast biopsy (Left, 2005); Breast lumpectomy (Left, 2005); Oophorectomy; and laparoscopic assisted vaginal hysterectomy salpingo oophorectomy (Bilateral).  Her family history includes Breast cancer (age of onset: 40) in her mother; Breast cancer (age of onset: 43) in her sister; Breast cancer (age of onset: 45) in her maternal aunt; Cancer in her brother; Ovarian cancer (age of onset: 40) in her sister.  She  reports that she has quit smoking. She has never used smokeless tobacco. She reports that she does not drink alcohol or use drugs.  Current Outpatient Medications   Medication Sig Dispense Refill   • Acetaminophen (TYLENOL ARTHRITIS EXT RELIEF PO) Take 2 tablets by mouth 3 (Three) Times a Day.     • betamethasone dipropionate (DIPROLENE) 0.05 % ointment Apply  topically to the appropriate area as directed 2 (Two) Times a Day.     • Calcium Citrate (CITRACAL PO) Take  by mouth.     • DICLOFENAC PO Take 75 mg by mouth 2 (Two) Times a Day.     • docusate sodium (STOOL SOFTENER) 100 MG capsule Take  by mouth.     • glucosamine sulfate 500 MG capsule capsule Take  by mouth 2 (Two) Times a Day With Meals.     • Magnesium  "500 MG capsule Take  by mouth.     • omeprazole (priLOSEC) 20 MG capsule One po qd 30 capsule 11   • Sod Picosulfate-Mag Ox-Cit Acd 10-3.5-12 MG-GM -GM/160ML solution Take 1 kit by mouth Take As Directed. Follow instructions that were mailed to your home. If you didn't receive these call [phone].  bottle 0   • tacrolimus (PROTOPIC) 0.1 % ointment Apply  topically to the appropriate area as directed 2 (Two) Times a Day.     • traMADol (ULTRAM) 50 MG tablet Take 1 tablet by mouth Every 6 (Six) Hours As Needed for Moderate Pain . 30 tablet 0     No current facility-administered medications for this visit.      She has No Known Allergies..    Review of Systems  A comprehensive review of systems was taken.  Constitutional: negative for fever, chills, activity change, appetite change, and unexpected weight change.  She does complain of recent fatigue and lethargy.  Respiratory: negative  Cardiovascular: negative  Gastrointestinal: negative  Genitourinary:positive for pelvic pressure  Musculoskeletal:negative  Behavioral/Psych: negative       /68   Ht 162.6 cm (64\")   Breastfeeding? No   BMI 28.22 kg/m²     Physical Exam    General:  alert; cooperative; well developed; well nourished  Appears tired   Skin:  No suspicious lesions seen   Thyroid: normal to inspection and palpation   Lungs:  clear to auscultation bilaterally   Heart:  regular rate and rhythm, S1, S2 normal, no murmur, click, rub or gallop   Breasts:  Examined in supine position  Symmetric without masses or skin dimpling  Nipples normal without inversion, lesions or discharge  There are no palpable axillary nodes  Fibrocystic changes are present both breasts without a discrete mass  scar left UOQ   Abdomen: soft, non-tender; no masses  no umbilical or inguinal hernias are present  no hepato-splenomegaly   Pelvis: Clinical staff was present for exam  External genitalia:  normal appearance of the external genitalia including Bartholin's and " Heyworth's glands.  Vaginal:  normal pink mucosa without prolapse or lesions. there is a Grade I-II rectocele/enterocele with no cystocele  Cervix:  absent.  Uterus:  absent.  Adnexa:  absent, bilateral.  Rectal:  anus visually normal appearing. recto-vaginal exam unremarkable and confirms findings;     Lab Review   CBC results, CMP results and TSH results from May of 2018 were all normal.    Imaging  Mammogram results- Birads II (10/16/2018)         ASSESSMENT  Problems Addressed this Visit        Digestive    Rectocele       Genitourinary    Menopause - Primary       Other    Fibrocystic breast changes, bilateral    Relevant Orders    Mammo Diagnostic Bilateral With CAD    US Breast Bilateral Complete      Other Visit Diagnoses     Galactorrhea not associated with childbirth        Relevant Orders    Prolactin    Mammo Diagnostic Bilateral With CAD    US Breast Bilateral Complete    Encounter for gynecological examination with abnormal finding        Other fatigue        Relevant Orders    Vitamin D 25 Hydroxy    Mastodynia        Relevant Orders    Mammo Diagnostic Bilateral With CAD    US Breast Bilateral Complete          PLAN    · Medications prescribed this encounter:  No orders of the defined types were placed in this encounter.  · A serum prolactin and 25-hydroxy vitamin D level was ordered.  I have encouraged the patient to see primary care regarding her fatigue and lethargy.  · I have given the patient pamphlets about posterior/enterocele repair.  I have encouraged her to consider having this done since she is experiencing increasing pressure.  · Continue monthly self breast assessment.  Consultative mammograms and ultrasound ordered.  Avoid caffeine and chocolate.  Management will be based on the breast imaging results.  · Calcium, 600 mg/ Vit. D, 400 IU daily; regular weight-bearing exercise  · Follow up: PRN based on testing  *Please note that portions of this documentation may have been completed with a  voice recognition program.  Efforts were made to edit this dictation, but occasional words may have been mistranscribed.       This note was electronically signed.    WAQAR Leahy MD  July 11, 2019  9:14 AM

## 2019-07-11 NOTE — PROGRESS NOTES
Subjective   Sherin Azul is a 59 y.o. female.   Chief Complaint   Patient presents with   • Fatigue      History of Present Illness Cont to have hand pain.  Sees Dr Poe for injections patient does not believe they are really helping.  She uses Tyle ES and diclofenac without relief.  She uses tramadol on days the pain is really bad.  She is trying to get a different job.  She reports the pain is constant and is worse with repetitive motion.    She reports she is having trouble doing her job related to pain.  She also reports she remains tired.  She reports she is sleeping okay.Patient denies fever chills, headache, ear pain, sore throat, shortness of air, cough, wheezing, chest pain, abdominal pain, nausea, vomiting, diarrhea, dysuria, blood in stool or urine.  Mood is good.  Eating and drinking as usual.  No unexplained weight loss or gain.  She reports she has seen Dr. Hegar today for her breast tenderness.  She is scheduled for a mammogram    The following portions of the patient's history were reviewed and updated as appropriate: allergies, current medications, past family history, past medical history, past social history, past surgical history and problem list.    Current Outpatient Medications:   •  Acetaminophen (TYLENOL ARTHRITIS EXT RELIEF PO), Take 2 tablets by mouth 3 (Three) Times a Day., Disp: , Rfl:   •  betamethasone dipropionate (DIPROLENE) 0.05 % ointment, Apply  topically to the appropriate area as directed 2 (Two) Times a Day., Disp: , Rfl:   •  Calcium Citrate (CITRACAL PO), Take  by mouth., Disp: , Rfl:   •  docusate sodium (STOOL SOFTENER) 100 MG capsule, Take  by mouth., Disp: , Rfl:   •  glucosamine sulfate 500 MG capsule capsule, Take  by mouth 2 (Two) Times a Day With Meals., Disp: , Rfl:   •  Magnesium 500 MG capsule, Take  by mouth., Disp: , Rfl:   •  omeprazole (priLOSEC) 20 MG capsule, One po qd, Disp: 30 capsule, Rfl: 11  •  Sod Picosulfate-Mag Ox-Cit Acd 10-3.5-12 MG-GM  "-GM/160ML solution, Take 1 kit by mouth Take As Directed. Follow instructions that were mailed to your home. If you didn't receive these call [phone]., Disp: 2 bottle, Rfl: 0  •  tacrolimus (PROTOPIC) 0.1 % ointment, Apply  topically to the appropriate area as directed 2 (Two) Times a Day., Disp: , Rfl:   •  traMADol (ULTRAM) 50 MG tablet, Take 1 tablet by mouth Every 6 (Six) Hours As Needed for Moderate Pain ., Disp: 30 tablet, Rfl: 0  •  diclofenac (VOLTAREN) 75 MG EC tablet, Take 1 tablet by mouth 2 (Two) Times a Day., Disp: 60 tablet, Rfl: 2    Review of Systems Consitutional, HEENT, Respiratory, CV, GI, , Skin, Musculoskeletal, Neuro-mental, Endocrinological, Hematological were reviewed.  Positives were discussed in the HPI, otherwise ROS was negative   /80   Pulse 67   Temp 97.7 °F (36.5 °C) (Oral)   Ht 162.6 cm (64\")   Wt 74.4 kg (164 lb)   SpO2 94%   Breastfeeding? No   BMI 28.15 kg/m²     Objective   No Known Allergies    Physical Exam   Constitutional: She is oriented to person, place, and time. She appears well-developed and well-nourished.   HENT:   Head: Normocephalic.   Neck: Neck supple. No thyromegaly present.   Cardiovascular: Normal rate, regular rhythm, normal heart sounds and intact distal pulses. Exam reveals no gallop and no friction rub.   No murmur heard.  Pulmonary/Chest: Effort normal and breath sounds normal. No stridor. No respiratory distress. She has no wheezes. She has no rales.   Abdominal: Soft. She exhibits no mass. There is no tenderness.   Musculoskeletal:   Hands have nodules at the DIP and MIP joints.  Some swelling.  Has strong radial and ulnar pulses.    Lymphadenopathy:     She has no cervical adenopathy.   Neurological: She is alert and oriented to person, place, and time.   Skin: Skin is warm and dry. Capillary refill takes less than 2 seconds.   Is pink, no rash    Psychiatric: She has a normal mood and affect. Her behavior is normal. Judgment and " thought content normal.   Nursing note and vitals reviewed.      Procedures    LABS  Results for orders placed or performed in visit on 07/11/19   Comprehensive Metabolic Panel   Result Value Ref Range    Glucose 88 65 - 99 mg/dL    BUN 20 6 - 20 mg/dL    Creatinine 0.55 (L) 0.57 - 1.00 mg/dL    Sodium 140 136 - 145 mmol/L    Potassium 4.3 3.5 - 5.2 mmol/L    Chloride 104 98 - 107 mmol/L    CO2 23.8 22.0 - 29.0 mmol/L    Calcium 9.6 8.6 - 10.5 mg/dL    Total Protein 7.4 6.0 - 8.5 g/dL    Albumin 4.50 3.50 - 5.20 g/dL    ALT (SGPT) 22 1 - 33 U/L    AST (SGOT) 16 1 - 32 U/L    Alkaline Phosphatase 83 39 - 117 U/L    Total Bilirubin 0.4 0.2 - 1.2 mg/dL    eGFR Non African Amer 114 >60 mL/min/1.73    Globulin 2.9 gm/dL    A/G Ratio 1.6 g/dL    BUN/Creatinine Ratio 36.4 (H) 7.0 - 25.0    Anion Gap 12.2 5.0 - 15.0 mmol/L   Lipid Panel   Result Value Ref Range    Total Cholesterol 219 (H) 0 - 200 mg/dL    Triglycerides 85 0 - 150 mg/dL    HDL Cholesterol 58 40 - 60 mg/dL    LDL Cholesterol  144 (H) 0 - 100 mg/dL    VLDL Cholesterol 17 5 - 40 mg/dL    LDL/HDL Ratio 2.48    CBC Auto Differential   Result Value Ref Range    WBC 8.80 3.40 - 10.80 10*3/mm3    RBC 4.39 3.77 - 5.28 10*6/mm3    Hemoglobin 13.7 12.0 - 15.9 g/dL    Hematocrit 42.7 34.0 - 46.6 %    MCV 97.3 (H) 79.0 - 97.0 fL    MCH 31.2 26.6 - 33.0 pg    MCHC 32.1 31.5 - 35.7 g/dL    RDW 13.0 12.3 - 15.4 %    RDW-SD 46.5 37.0 - 54.0 fl    MPV 8.4 6.0 - 12.0 fL    Platelets 276 140 - 450 10*3/mm3    Neutrophil % 57.8 42.7 - 76.0 %    Lymphocyte % 30.5 19.6 - 45.3 %    Monocyte % 8.3 5.0 - 12.0 %    Eosinophil % 1.9 0.3 - 6.2 %    Basophil % 0.5 0.0 - 1.5 %    Immature Grans % 1.0 (H) 0.0 - 0.5 %    Neutrophils, Absolute 5.09 1.70 - 7.00 10*3/mm3    Lymphocytes, Absolute 2.68 0.70 - 3.10 10*3/mm3    Monocytes, Absolute 0.73 0.10 - 0.90 10*3/mm3    Eosinophils, Absolute 0.17 0.00 - 0.40 10*3/mm3    Basophils, Absolute 0.04 0.00 - 0.20 10*3/mm3    Immature Grans,  Absolute 0.09 (H) 0.00 - 0.05 10*3/mm3    nRBC 0.0 0.0 - 0.2 /100 WBC       Assessment/Plan   Sherin was seen today for fatigue.    Diagnoses and all orders for this visit:    Tired  -     CBC & Differential  -     Comprehensive Metabolic Panel  -     CBC Auto Differential    Pain in both hands  -     traMADol (ULTRAM) 50 MG tablet; Take 1 tablet by mouth Every 6 (Six) Hours As Needed for Moderate Pain .  -     diclofenac (VOLTAREN) 75 MG EC tablet; Take 1 tablet by mouth 2 (Two) Times a Day.  -     Sedimentation rate, automated; Future  -     RHEUMATOID FACTOR; Future    Dyslipidemia  -     Lipid Panel    Other orders  -     Cancel: Diclofenac 35 MG capsule; Take 75 mg by mouth 2 (Two) Times a Day.              Heather Rush, APRN

## 2019-07-12 LAB — CHROMATIN AB SERPL-ACNC: <10 IU/ML (ref 0–14)

## 2019-07-12 PROCEDURE — 85652 RBC SED RATE AUTOMATED: CPT | Performed by: NURSE PRACTITIONER

## 2019-07-12 NOTE — PATIENT INSTRUCTIONS
Labs as discussed.  Check with your employer about changing to a different job.  May need to see physical therapy or occupational therapy.  Pt verbalizes understanding and agreement with plan of care.

## 2019-07-13 ENCOUNTER — TELEPHONE (OUTPATIENT)
Dept: INTERNAL MEDICINE | Facility: CLINIC | Age: 59
End: 2019-07-13

## 2019-07-15 ENCOUNTER — TELEPHONE (OUTPATIENT)
Dept: INTERNAL MEDICINE | Facility: CLINIC | Age: 59
End: 2019-07-15

## 2019-07-15 RX ORDER — PRAVASTATIN SODIUM 10 MG
10 TABLET ORAL DAILY
Qty: 30 TABLET | Refills: 2 | Status: SHIPPED | OUTPATIENT
Start: 2019-07-15 | End: 2019-09-24 | Stop reason: SDUPTHER

## 2019-07-15 NOTE — TELEPHONE ENCOUNTER
RUCHI, PT IS RETURNING YOUR CALL, SHE DID NOT SAY WHAT IT IS REGARDING, PT CAN BE REACHED -[phone]

## 2019-07-19 ENCOUNTER — HOSPITAL ENCOUNTER (EMERGENCY)
Facility: HOSPITAL | Age: 59
Discharge: LEFT BEFORE DISCHARGE | End: 2019-07-19
Attending: EMERGENCY MEDICINE | Admitting: EMERGENCY MEDICINE

## 2019-07-19 VITALS
TEMPERATURE: 98 F | HEART RATE: 84 BPM | RESPIRATION RATE: 18 BRPM | SYSTOLIC BLOOD PRESSURE: 121 MMHG | OXYGEN SATURATION: 96 % | HEIGHT: 65 IN | BODY MASS INDEX: 27.49 KG/M2 | WEIGHT: 165 LBS | DIASTOLIC BLOOD PRESSURE: 71 MMHG

## 2019-07-19 DIAGNOSIS — L25.9 CONTACT DERMATITIS, UNSPECIFIED CONTACT DERMATITIS TYPE, UNSPECIFIED TRIGGER: Primary | ICD-10-CM

## 2019-07-19 DIAGNOSIS — H10.213 CHEMICAL CONJUNCTIVITIS OF BOTH EYES: ICD-10-CM

## 2019-07-19 PROCEDURE — 99282 EMERGENCY DEPT VISIT SF MDM: CPT

## 2019-07-19 RX ORDER — TETRACAINE HYDROCHLORIDE 5 MG/ML
2 SOLUTION OPHTHALMIC ONCE
Status: DISCONTINUED | OUTPATIENT
Start: 2019-07-19 | End: 2019-07-20 | Stop reason: HOSPADM

## 2019-07-25 ENCOUNTER — HOSPITAL ENCOUNTER (OUTPATIENT)
Dept: ULTRASOUND IMAGING | Facility: HOSPITAL | Age: 59
Discharge: HOME | End: 2019-07-25

## 2019-07-25 ENCOUNTER — HOSPITAL ENCOUNTER (OUTPATIENT)
Dept: MAMMOGRAPHY | Facility: HOSPITAL | Age: 59
Discharge: HOME | End: 2019-07-25
Admitting: OBSTETRICS & GYNECOLOGY

## 2019-07-25 DIAGNOSIS — N64.3 GALACTORRHEA NOT ASSOCIATED WITH CHILDBIRTH: ICD-10-CM

## 2019-07-25 DIAGNOSIS — N64.4 MASTODYNIA: ICD-10-CM

## 2019-07-25 PROCEDURE — 76642 ULTRASOUND BREAST LIMITED: CPT

## 2019-07-25 PROCEDURE — 77066 DX MAMMO INCL CAD BI: CPT

## 2019-07-25 PROCEDURE — G0279 TOMOSYNTHESIS, MAMMO: HCPCS

## 2019-07-25 PROCEDURE — 77066 DX MAMMO INCL CAD BI: CPT | Performed by: RADIOLOGY

## 2019-07-25 PROCEDURE — 76642 ULTRASOUND BREAST LIMITED: CPT | Performed by: RADIOLOGY

## 2019-07-25 PROCEDURE — 77062 BREAST TOMOSYNTHESIS BI: CPT | Performed by: RADIOLOGY

## 2019-09-24 ENCOUNTER — OFFICE VISIT (OUTPATIENT)
Dept: INTERNAL MEDICINE | Facility: CLINIC | Age: 59
End: 2019-09-24

## 2019-09-24 VITALS
SYSTOLIC BLOOD PRESSURE: 100 MMHG | WEIGHT: 164.8 LBS | BODY MASS INDEX: 27.42 KG/M2 | HEART RATE: 70 BPM | DIASTOLIC BLOOD PRESSURE: 80 MMHG | OXYGEN SATURATION: 98 %

## 2019-09-24 DIAGNOSIS — L02.416 ABSCESS OF LEFT LEG: ICD-10-CM

## 2019-09-24 DIAGNOSIS — E78.5 DYSLIPIDEMIA: Primary | ICD-10-CM

## 2019-09-24 PROCEDURE — 90686 IIV4 VACC NO PRSV 0.5 ML IM: CPT | Performed by: PHYSICIAN ASSISTANT

## 2019-09-24 PROCEDURE — 99213 OFFICE O/P EST LOW 20 MIN: CPT | Performed by: PHYSICIAN ASSISTANT

## 2019-09-24 PROCEDURE — 90471 IMMUNIZATION ADMIN: CPT | Performed by: PHYSICIAN ASSISTANT

## 2019-09-24 RX ORDER — SULFAMETHOXAZOLE AND TRIMETHOPRIM 800; 160 MG/1; MG/1
1 TABLET ORAL 2 TIMES DAILY
Qty: 14 TABLET | Refills: 0 | Status: SHIPPED | OUTPATIENT
Start: 2019-09-24 | End: 2020-02-14

## 2019-09-24 RX ORDER — PRAVASTATIN SODIUM 10 MG
10 TABLET ORAL DAILY
Qty: 90 TABLET | Refills: 1 | Status: SHIPPED | OUTPATIENT
Start: 2019-09-24 | End: 2020-06-11

## 2019-09-24 NOTE — PROGRESS NOTES
Chief Complaint   Patient presents with   • Possible MRSA on left leg     Acute       Subjective   Sherin Azul is a 59 y.o. female.       History of Present Illness     Pt noticed a bump on her left upper leg about 4 days ago. Since then it has opened up on its own and drained. No longer painful. History of MRSA with severe infection. Has had significant stress recently that she wonders if contributing to her infection.        Current Outpatient Medications:   •  Acetaminophen (TYLENOL ARTHRITIS EXT RELIEF PO), Take 2 tablets by mouth 3 (Three) Times a Day., Disp: , Rfl:   •  betamethasone dipropionate (DIPROLENE) 0.05 % ointment, Apply  topically to the appropriate area as directed 2 (Two) Times a Day., Disp: , Rfl:   •  Calcium Citrate (CITRACAL PO), Take  by mouth., Disp: , Rfl:   •  diclofenac (VOLTAREN) 75 MG EC tablet, Take 1 tablet by mouth 2 (Two) Times a Day., Disp: 60 tablet, Rfl: 2  •  docusate sodium (STOOL SOFTENER) 100 MG capsule, Take  by mouth., Disp: , Rfl:   •  glucosamine sulfate 500 MG capsule capsule, Take  by mouth 2 (Two) Times a Day With Meals., Disp: , Rfl:   •  Magnesium 500 MG capsule, Take  by mouth., Disp: , Rfl:   •  omeprazole (priLOSEC) 20 MG capsule, One po qd, Disp: 30 capsule, Rfl: 11  •  pravastatin (PRAVACHOL) 10 MG tablet, Take 1 tablet by mouth Daily., Disp: 90 tablet, Rfl: 1  •  Sod Picosulfate-Mag Ox-Cit Acd 10-3.5-12 MG-GM -GM/160ML solution, Take 1 kit by mouth Take As Directed. Follow instructions that were mailed to your home. If you didn't receive these call [phone]., Disp: 2 bottle, Rfl: 0  •  tacrolimus (PROTOPIC) 0.1 % ointment, Apply  topically to the appropriate area as directed 2 (Two) Times a Day., Disp: , Rfl:   •  traMADol (ULTRAM) 50 MG tablet, Take 1 tablet by mouth Every 6 (Six) Hours As Needed for Moderate Pain ., Disp: 30 tablet, Rfl: 0  •  sulfamethoxazole-trimethoprim (BACTRIM DS) 800-160 MG per tablet, Take 1 tablet by mouth 2 (Two)  Times a Day., Disp: 14 tablet, Rfl: 0     PMFSH  The following portions of the patient's history were reviewed and updated as appropriate: allergies, current medications, past family history, past medical history, past social history, past surgical history and problem list.    Review of Systems   Constitutional: Negative for activity change, appetite change, fatigue, fever and unexpected weight change.   HENT: Negative for facial swelling, mouth sores and trouble swallowing.    Eyes: Negative for pain, discharge, itching and visual disturbance.   Respiratory: Negative for chest tightness, shortness of breath and wheezing.    Cardiovascular: Negative for chest pain and palpitations.   Gastrointestinal: Negative for abdominal pain, diarrhea, nausea and vomiting.   Endocrine: Negative.    Genitourinary: Negative.    Musculoskeletal: Negative for joint swelling.   Skin: Positive for wound.   Allergic/Immunologic: Negative.    Neurological: Negative for seizures and syncope.   Hematological: Does not bruise/bleed easily.   Psychiatric/Behavioral: Negative.        Objective   /80   Pulse 70   Wt 74.8 kg (164 lb 12.8 oz)   SpO2 98%   BMI 27.42 kg/m²     Physical Exam   Constitutional: She appears well-developed and well-nourished.   HENT:   Head: Normocephalic.   Right Ear: Hearing, tympanic membrane, external ear and ear canal normal.   Left Ear: Hearing, tympanic membrane, external ear and ear canal normal.   Nose: Nose normal.   Mouth/Throat: Oropharynx is clear and moist.   Eyes: Conjunctivae are normal. Pupils are equal, round, and reactive to light.   Neck: Normal range of motion.   Cardiovascular: Normal rate, regular rhythm and normal heart sounds.   Pulmonary/Chest: Effort normal and breath sounds normal. She has no decreased breath sounds. She has no wheezes. She has no rhonchi. She has no rales.   Musculoskeletal: Normal range of motion.   Neurological: She is alert.   Skin: Skin is warm and dry.         Psychiatric: She has a normal mood and affect. Her behavior is normal.   Nursing note and vitals reviewed.      Results for orders placed or performed in visit on 07/11/19   Sedimentation rate, automated   Result Value Ref Range    Sed Rate 5 0 - 30 mm/hr        ASSESSMENT/PLAN    Problem List Items Addressed This Visit        Other    Dyslipidemia - Primary    Relevant Medications    pravastatin (PRAVACHOL) 10 MG tablet      Other Visit Diagnoses     Abscess of left leg        Resolving. Due to history and remaining infection. Start docycycline as directed. Call or rtc if no improvement.    Relevant Medications    sulfamethoxazole-trimethoprim (BACTRIM DS) 800-160 MG per tablet               Return if symptoms worsen or fail to improve.

## 2019-12-09 ENCOUNTER — TRANSCRIBE ORDERS (OUTPATIENT)
Dept: INTERNAL MEDICINE | Facility: CLINIC | Age: 59
End: 2019-12-09

## 2019-12-09 ENCOUNTER — TRANSCRIBE ORDERS (OUTPATIENT)
Dept: ADMINISTRATIVE | Facility: HOSPITAL | Age: 59
End: 2019-12-09

## 2020-02-14 ENCOUNTER — APPOINTMENT (OUTPATIENT)
Dept: LAB | Facility: HOSPITAL | Age: 60
End: 2020-02-14

## 2020-02-14 ENCOUNTER — OFFICE VISIT (OUTPATIENT)
Dept: INTERNAL MEDICINE | Facility: CLINIC | Age: 60
End: 2020-02-14

## 2020-02-14 VITALS
HEIGHT: 65 IN | OXYGEN SATURATION: 97 % | DIASTOLIC BLOOD PRESSURE: 82 MMHG | BODY MASS INDEX: 27.99 KG/M2 | WEIGHT: 168 LBS | HEART RATE: 67 BPM | SYSTOLIC BLOOD PRESSURE: 120 MMHG

## 2020-02-14 DIAGNOSIS — L40.9 PSORIASIS: ICD-10-CM

## 2020-02-14 DIAGNOSIS — M19.90 ARTHRITIS: ICD-10-CM

## 2020-02-14 DIAGNOSIS — Z23 NEED FOR TDAP VACCINATION: ICD-10-CM

## 2020-02-14 DIAGNOSIS — Z87.891 HISTORY OF TOBACCO USE, PRESENTING HAZARDS TO HEALTH: ICD-10-CM

## 2020-02-14 DIAGNOSIS — Z00.00 HEALTH CARE MAINTENANCE: Primary | ICD-10-CM

## 2020-02-14 DIAGNOSIS — Z78.0 POST-MENOPAUSAL: ICD-10-CM

## 2020-02-14 DIAGNOSIS — Z12.11 COLON CANCER SCREENING: ICD-10-CM

## 2020-02-14 PROCEDURE — 85652 RBC SED RATE AUTOMATED: CPT | Performed by: PHYSICIAN ASSISTANT

## 2020-02-14 PROCEDURE — 85025 COMPLETE CBC W/AUTO DIFF WBC: CPT | Performed by: PHYSICIAN ASSISTANT

## 2020-02-14 PROCEDURE — 86431 RHEUMATOID FACTOR QUANT: CPT | Performed by: PHYSICIAN ASSISTANT

## 2020-02-14 PROCEDURE — 80061 LIPID PANEL: CPT | Performed by: PHYSICIAN ASSISTANT

## 2020-02-14 PROCEDURE — 99213 OFFICE O/P EST LOW 20 MIN: CPT | Performed by: PHYSICIAN ASSISTANT

## 2020-02-14 PROCEDURE — 99396 PREV VISIT EST AGE 40-64: CPT | Performed by: PHYSICIAN ASSISTANT

## 2020-02-14 PROCEDURE — 86200 CCP ANTIBODY: CPT | Performed by: PHYSICIAN ASSISTANT

## 2020-02-14 PROCEDURE — 80053 COMPREHEN METABOLIC PANEL: CPT | Performed by: PHYSICIAN ASSISTANT

## 2020-02-14 PROCEDURE — 86038 ANTINUCLEAR ANTIBODIES: CPT | Performed by: PHYSICIAN ASSISTANT

## 2020-02-14 PROCEDURE — 86140 C-REACTIVE PROTEIN: CPT | Performed by: PHYSICIAN ASSISTANT

## 2020-02-14 PROCEDURE — 90715 TDAP VACCINE 7 YRS/> IM: CPT | Performed by: PHYSICIAN ASSISTANT

## 2020-02-14 PROCEDURE — 84443 ASSAY THYROID STIM HORMONE: CPT | Performed by: PHYSICIAN ASSISTANT

## 2020-02-14 PROCEDURE — 90471 IMMUNIZATION ADMIN: CPT | Performed by: PHYSICIAN ASSISTANT

## 2020-02-14 RX ORDER — CLOBETASOL PROPIONATE 0.5 MG/G
OINTMENT TOPICAL 2 TIMES DAILY
Qty: 60 G | Refills: 1 | Status: SHIPPED | OUTPATIENT
Start: 2020-02-14 | End: 2022-06-02

## 2020-02-14 RX ORDER — CYCLOBENZAPRINE HCL 10 MG
10 TABLET ORAL 3 TIMES DAILY PRN
Qty: 30 TABLET | Refills: 3 | Status: SHIPPED | OUTPATIENT
Start: 2020-02-14 | End: 2022-06-02

## 2020-02-14 NOTE — PROGRESS NOTES
"Chief Complaint   Patient presents with   • Annual Exam       Subjective   Sherin Azul is a 59 y.o. female.       History of Present Illness     The patient is being seen for a health maintenance evaluation.  The last health maintenance was 2 year(s) ago.    Social History  Sherin  does not smoke cigarettes. Quit 3 years ago.  She drinks no alcohol.  She does not use illicit drugs.    General History  Sherin  does have regular dental visits.  She does complain of vision problems. Wears glasses. Last eye exam was .  Immunizations are not up to date. The patient needs the following immunizations: shingrix needed; TDaP needed    Lifestyle  Sherin  consumes in general, an \"unhealthy\" diet.  She exercises intermittently.    Reproductive Health  Sherin  is postmenopausal.  She reports periods are nonexistent since hysterectomy at age 47.  She is not sexually active. Her contraceptive plan is no method.    Screening  Last pap was unknown. History of abnormal pap smear or family history of gyn cancer: none; gynecological cancer in her sister- ovarian, breast cancer  Last mammogram was  2019. Personal or family history of abnormal mammograms or breast cancer: sister with breast cancer  Last colonoscopy was  by unknown provider. Family history of colon cancer: none  Last DEXA was never.     Brother  last year with kidney cancer; Brother with lung cancer;       Pt has had psoriasis for years and is suspicious that she may have psoriatic arthritis. Having worsening joint pain all over. She has seen different people at [facility]. Would like to see Dr Victoria.               Current Outpatient Medications:   •  Acetaminophen (TYLENOL ARTHRITIS EXT RELIEF PO), Take 2 tablets by mouth 3 (Three) Times a Day., Disp: , Rfl:   •  Calcium Citrate (CITRACAL PO), Take  by mouth., Disp: , Rfl:   •  docusate sodium (STOOL SOFTENER) 100 MG capsule, Take  by mouth., Disp: , Rfl:   •  omeprazole " "(priLOSEC) 20 MG capsule, One po qd, Disp: 30 capsule, Rfl: 11  •  pravastatin (PRAVACHOL) 10 MG tablet, Take 1 tablet by mouth Daily., Disp: 90 tablet, Rfl: 1  •  traMADol (ULTRAM) 50 MG tablet, Take 1 tablet by mouth Every 6 (Six) Hours As Needed for Moderate Pain ., Disp: 30 tablet, Rfl: 0  •  clobetasol (TEMOVATE) 0.05 % ointment, Apply  topically to the appropriate area as directed 2 (Two) Times a Day., Disp: 60 g, Rfl: 1  •  cyclobenzaprine (FLEXERIL) 10 MG tablet, Take 1 tablet by mouth 3 (Three) Times a Day As Needed for Muscle Spasms., Disp: 30 tablet, Rfl: 3     PMFSH  The following portions of the patient's history were reviewed and updated as appropriate: allergies, current medications, past family history, past medical history, past social history, past surgical history and problem list.    Review of Systems   Constitutional: Negative for appetite change, fever and unexpected weight change.   HENT: Negative.  Negative for ear pain, facial swelling and sore throat.    Eyes: Negative for pain and visual disturbance.   Respiratory: Negative for chest tightness, shortness of breath and wheezing.         No nipple discharge or breast mass   Cardiovascular: Negative for chest pain and palpitations.   Gastrointestinal: Negative for abdominal pain and blood in stool.   Endocrine: Negative.    Genitourinary: Negative for difficulty urinating, dyspareunia, dysuria, frequency, hematuria, menstrual problem, pelvic pain, vaginal discharge and vaginal pain.   Musculoskeletal: Negative for joint swelling.   Skin: Negative for color change, rash and wound.   Allergic/Immunologic: Negative.    Neurological: Negative for dizziness, tremors, seizures and syncope.   Hematological: Negative for adenopathy.   Psychiatric/Behavioral: Negative.    All other systems reviewed and are negative.      Objective   /82   Pulse 67   Ht 165.1 cm (65\")   Wt 76.2 kg (168 lb)   SpO2 97%   BMI 27.96 kg/m²     Physical Exam "   Constitutional: She is oriented to person, place, and time. She appears well-developed and well-nourished. No distress.   HENT:   Head: Normocephalic and atraumatic. Hair is normal.   Right Ear: Hearing, tympanic membrane, external ear and ear canal normal. No drainage. No decreased hearing is noted.   Left Ear: Hearing, tympanic membrane, external ear and ear canal normal. No decreased hearing is noted.   Nose: No nasal deformity.   Mouth/Throat: Oropharynx is clear and moist.   Eyes: Pupils are equal, round, and reactive to light. Conjunctivae, EOM and lids are normal. Lids are everted and swept, no foreign bodies found. Right eye exhibits no discharge. Left eye exhibits no discharge.   Fundoscopic exam:       The right eye shows red reflex.        The left eye shows red reflex.   Neck: Normal range of motion. Neck supple. No JVD present. No tracheal deviation present. No thyromegaly present.   Cardiovascular: Normal rate, regular rhythm, normal heart sounds, intact distal pulses and normal pulses. Exam reveals no gallop and no friction rub.   No murmur heard.  Pulmonary/Chest: Effort normal and breath sounds normal. No respiratory distress. She has no wheezes. She has no rales. She exhibits no tenderness. Right breast exhibits no inverted nipple, no mass, no nipple discharge, no skin change and no tenderness. Left breast exhibits no inverted nipple, no mass, no nipple discharge, no skin change and no tenderness.   Abdominal: Soft. Bowel sounds are normal. She exhibits no distension and no mass. There is no tenderness. There is no rebound and no guarding. No hernia.   Musculoskeletal: Normal range of motion. She exhibits no edema, tenderness or deformity.   Lymphadenopathy:     She has no cervical adenopathy.        Right: No inguinal adenopathy present.        Left: No inguinal adenopathy present.   Neurological: She is alert and oriented to person, place, and time. She has normal reflexes. She displays normal  reflexes. No cranial nerve deficit. She exhibits normal muscle tone. Coordination normal.   Skin: Skin is warm and dry. No rash noted. She is not diaphoretic. No erythema.   Psychiatric: She has a normal mood and affect. Her behavior is normal. Judgment and thought content normal.   Nursing note and vitals reviewed.           ASSESSMENT/PLAN    Problem List Items Addressed This Visit        Musculoskeletal and Integument    Psoriasis    Relevant Medications    clobetasol (TEMOVATE) 0.05 % ointment    Other Relevant Orders    Sedimentation Rate (Completed)    Cyclic Citrul Peptide Antibody, IgG / IgA    Rheumatoid Factor (Completed)    C-reactive Protein (Completed)    OMID Direct Reflex to 11 Biomarker    Ambulatory Referral to Rheumatology       Other    Health care maintenance - Primary     Immunizations: shingrix recommended; TDaP today  Eye exam: done 2018  Pap Smear: no longer needed due to hysterectomy  Mammogram: done 7/2019, repeat 7/2020  Dexa: ordered  Colonoscopy: done 2013, ordered  Labs: fasting labs ordered    Counseled patient regarding multimodal approach with healthy nutrition, healthy sleep, regular physical activity, social activities, counseling, safety measures and medications.          Relevant Orders    CBC & Differential (Completed)    Comprehensive Metabolic Panel (Completed)    Lipid Panel (Completed)    TSH (Completed)    CBC Auto Differential (Completed)      Other Visit Diagnoses     Arthritis        Labs ordered. Refer to Rheumatology to discuss possibility of psoriatic arthritis.    Relevant Medications    cyclobenzaprine (FLEXERIL) 10 MG tablet    Other Relevant Orders    Sedimentation Rate (Completed)    Cyclic Citrul Peptide Antibody, IgG / IgA    Rheumatoid Factor (Completed)    C-reactive Protein (Completed)    OMID Direct Reflex to 11 Biomarker    Ambulatory Referral to Rheumatology    History of tobacco use, presenting hazards to health        Relevant Orders    CT Chest Low Dose  Wo    Colon cancer screening        Relevant Orders    Ambulatory Referral For Screening Colonoscopy    Post-menopausal        Relevant Orders    DEXA Bone Density Axial    Need for Tdap vaccination        Relevant Orders    Tdap Vaccine Greater Than or Equal To 8yo IM (Completed)               Return in about 1 year (around 2/14/2021) for Annual with fasting labs.

## 2020-02-15 LAB
ALBUMIN SERPL-MCNC: 4.5 G/DL (ref 3.5–5.2)
ALBUMIN/GLOB SERPL: 1.5 G/DL
ALP SERPL-CCNC: 74 U/L (ref 39–117)
ALT SERPL W P-5'-P-CCNC: 16 U/L (ref 1–33)
ANION GAP SERPL CALCULATED.3IONS-SCNC: 13 MMOL/L (ref 5–15)
AST SERPL-CCNC: 18 U/L (ref 1–32)
BASOPHILS # BLD AUTO: 0.04 10*3/MM3 (ref 0–0.2)
BASOPHILS NFR BLD AUTO: 0.5 % (ref 0–1.5)
BILIRUB SERPL-MCNC: 0.2 MG/DL (ref 0.2–1.2)
BUN BLD-MCNC: 18 MG/DL (ref 6–20)
BUN/CREAT SERPL: 32.7 (ref 7–25)
CALCIUM SPEC-SCNC: 9.5 MG/DL (ref 8.6–10.5)
CHLORIDE SERPL-SCNC: 103 MMOL/L (ref 98–107)
CHOLEST SERPL-MCNC: 174 MG/DL (ref 0–200)
CHROMATIN AB SERPL-ACNC: <10 IU/ML (ref 0–14)
CO2 SERPL-SCNC: 25 MMOL/L (ref 22–29)
CREAT BLD-MCNC: 0.55 MG/DL (ref 0.57–1)
CRP SERPL-MCNC: 0.17 MG/DL (ref 0–0.5)
DEPRECATED RDW RBC AUTO: 41.9 FL (ref 37–54)
EOSINOPHIL # BLD AUTO: 0.17 10*3/MM3 (ref 0–0.4)
EOSINOPHIL NFR BLD AUTO: 2.2 % (ref 0.3–6.2)
ERYTHROCYTE [DISTWIDTH] IN BLOOD BY AUTOMATED COUNT: 12.5 % (ref 12.3–15.4)
ERYTHROCYTE [SEDIMENTATION RATE] IN BLOOD: 6 MM/HR (ref 0–30)
GFR SERPL CREATININE-BSD FRML MDRD: 114 ML/MIN/1.73
GLOBULIN UR ELPH-MCNC: 3.1 GM/DL
GLUCOSE BLD-MCNC: 95 MG/DL (ref 65–99)
HCT VFR BLD AUTO: 40.7 % (ref 34–46.6)
HDLC SERPL-MCNC: 46 MG/DL (ref 40–60)
HGB BLD-MCNC: 14 G/DL (ref 12–15.9)
IMM GRANULOCYTES # BLD AUTO: 0.04 10*3/MM3 (ref 0–0.05)
IMM GRANULOCYTES NFR BLD AUTO: 0.5 % (ref 0–0.5)
LDLC SERPL CALC-MCNC: 110 MG/DL (ref 0–100)
LDLC/HDLC SERPL: 2.39 {RATIO}
LYMPHOCYTES # BLD AUTO: 2.7 10*3/MM3 (ref 0.7–3.1)
LYMPHOCYTES NFR BLD AUTO: 34.4 % (ref 19.6–45.3)
MCH RBC QN AUTO: 31.7 PG (ref 26.6–33)
MCHC RBC AUTO-ENTMCNC: 34.4 G/DL (ref 31.5–35.7)
MCV RBC AUTO: 92.1 FL (ref 79–97)
MONOCYTES # BLD AUTO: 0.82 10*3/MM3 (ref 0.1–0.9)
MONOCYTES NFR BLD AUTO: 10.5 % (ref 5–12)
NEUTROPHILS # BLD AUTO: 4.07 10*3/MM3 (ref 1.7–7)
NEUTROPHILS NFR BLD AUTO: 51.9 % (ref 42.7–76)
NRBC BLD AUTO-RTO: 0 /100 WBC (ref 0–0.2)
PLATELET # BLD AUTO: 257 10*3/MM3 (ref 140–450)
PMV BLD AUTO: 8.9 FL (ref 6–12)
POTASSIUM BLD-SCNC: 4.6 MMOL/L (ref 3.5–5.2)
PROT SERPL-MCNC: 7.6 G/DL (ref 6–8.5)
RBC # BLD AUTO: 4.42 10*6/MM3 (ref 3.77–5.28)
SODIUM BLD-SCNC: 141 MMOL/L (ref 136–145)
TRIGL SERPL-MCNC: 91 MG/DL (ref 0–150)
TSH SERPL DL<=0.05 MIU/L-ACNC: 1.76 UIU/ML (ref 0.27–4.2)
VLDLC SERPL-MCNC: 18.2 MG/DL (ref 5–40)
WBC NRBC COR # BLD: 7.84 10*3/MM3 (ref 3.4–10.8)

## 2020-02-16 PROBLEM — Z00.00 HEALTH CARE MAINTENANCE: Status: ACTIVE | Noted: 2020-02-16

## 2020-02-17 LAB — ANA SER QL: NEGATIVE

## 2020-02-17 NOTE — ASSESSMENT & PLAN NOTE
Immunizations: shingrix recommended; TDaP today  Eye exam: done 2018  Pap Smear: no longer needed due to hysterectomy  Mammogram: done 7/2019, repeat 7/2020  Dexa: ordered  Colonoscopy: done 2013, ordered  Labs: fasting labs ordered    Counseled patient regarding multimodal approach with healthy nutrition, healthy sleep, regular physical activity, social activities, counseling, safety measures and medications.

## 2020-02-18 LAB — CCP IGA+IGG SERPL IA-ACNC: 8 UNITS (ref 0–19)

## 2020-02-21 ENCOUNTER — TELEPHONE (OUTPATIENT)
Dept: INTERNAL MEDICINE | Facility: CLINIC | Age: 60
End: 2020-02-21

## 2020-02-21 NOTE — PROGRESS NOTES
Your autoimmune labs were all normal. We will see what the Rheumatologist has to say about your symptoms.    Everything else looks fine!

## 2020-02-21 NOTE — TELEPHONE ENCOUNTER
PT WANTS A REFERRAL TO A DIFFERENT RHEUMATOLOGIST, WOULD LIKE TO SEE SOMEONE AT [facility]

## 2020-02-28 ENCOUNTER — APPOINTMENT (OUTPATIENT)
Dept: BONE DENSITY | Facility: HOSPITAL | Age: 60
End: 2020-02-28

## 2020-02-28 ENCOUNTER — HOSPITAL ENCOUNTER (OUTPATIENT)
Dept: CT IMAGING | Facility: HOSPITAL | Age: 60
Discharge: HOME | End: 2020-02-28
Admitting: PHYSICIAN ASSISTANT

## 2020-02-28 DIAGNOSIS — Z87.891 HISTORY OF TOBACCO USE, PRESENTING HAZARDS TO HEALTH: ICD-10-CM

## 2020-02-28 PROCEDURE — G0297 LDCT FOR LUNG CA SCREEN: HCPCS

## 2020-03-12 RX ORDER — SODIUM, POTASSIUM,MAG SULFATES 17.5-3.13G
1 SOLUTION, RECONSTITUTED, ORAL ORAL TAKE AS DIRECTED
Qty: 2 BOTTLE | Refills: 0 | Status: SHIPPED | OUTPATIENT
Start: 2020-03-12 | End: 2020-06-11

## 2020-05-13 ENCOUNTER — TELEPHONE (OUTPATIENT)
Dept: INTERNAL MEDICINE | Facility: CLINIC | Age: 60
End: 2020-05-13

## 2020-05-13 DIAGNOSIS — M25.50 DIFFUSE ARTHRALGIA: ICD-10-CM

## 2020-05-13 DIAGNOSIS — L40.9 PSORIASIS: Primary | ICD-10-CM

## 2020-05-13 NOTE — TELEPHONE ENCOUNTER
PT IS REQUESTING A REFERRAL [facility] [place] [facility]  FOR DR. PATRICIA    ATTN: JIAN MICHELLE       PLEASE ADVISE PT @ [phone]

## 2020-05-14 NOTE — TELEPHONE ENCOUNTER
Melia, Rheumatology referral ordered. Looks like she has had a couple other appts scheduled already- not sure what happened with those. Also wanted to send this to you because of the person she requested the order be sent to.

## 2020-05-14 NOTE — TELEPHONE ENCOUNTER
Also, I would advise letting patient know show, if she gets dismissed from [facility], she's down to [facility].  [facility] is hard enough to get into as it is with an appointment & both offices have cancellation & no show policies.

## 2020-05-14 NOTE — TELEPHONE ENCOUNTER
When the referral was issued in Feb. We sent it to the [facility] & then pt did not want to go there.  PT wanted to go to [facility], so she was set up w/ an ascencion.    I spoke w/ someone @ [facility].  The patient cancelled the April 23 appointment.  I was also told the patient has had several cancellations.  They may not take her back as a patient.  I'm not sure.  They have very strict rules.    I have spoken with Maricruz @ [facility].  The pt had been there back in 2016 & saw Dr. Brett Rush.  Their normal policy is not you're not allowed to switch providers at all but made an exception d/t Brett's waitlist being so long.  Pt was seen in October that year & then no showed her other appointments.  They also have a No show policy.      Maricruz & Khang @ [facility] have talked and feel it is best the patient go to [facility] as the pt has already been seen by several providers in the office & did not seem satisfied with the care.  They do not wish to schedule this patient again.    The patient had emailed Maricruz & she was going to e-mail her back.

## 2020-05-15 NOTE — TELEPHONE ENCOUNTER
Informed pt she stated that [facility] is a Tier 2 and is to expensive. Pt stated that she does not wish to schedule an apt at this time.

## 2020-05-15 NOTE — TELEPHONE ENCOUNTER
Please let her know that because she has cancelled several appointments with the [facility], they are unwilling to schedule another appointment for her. They should be sending her an email about this.    Our next option is [facility]. Would she like us to schedule an appointment there, did she go to the one that was previously scheduled? They also have a no show and cancellation policies so we want to make sure she is okay with this before scheduling the appointment.

## 2020-05-18 ENCOUNTER — TELEPHONE (OUTPATIENT)
Dept: INTERNAL MEDICINE | Facility: CLINIC | Age: 60
End: 2020-05-18

## 2020-05-18 NOTE — TELEPHONE ENCOUNTER
Patient called and wanted a referral to see Lida Kapoor at [facility]. I let her know she may have to be seen first but someone would give her a call back to discuss it further.    You can reach the patient at [phone]

## 2020-05-18 NOTE — TELEPHONE ENCOUNTER
Adam Cárdenas for all the messages but she would like to go to [facility], Dr Kapoor. Thanks, Elidia

## 2020-05-19 NOTE — TELEPHONE ENCOUNTER
Ok, I will send it there.  But FYI, they are not scheduling new patients right now.  I can send the referral over there, but their coordinator is still on furlough & they do not know when she will be back.  And they are not scheduling new patient's because they have to been seen in person & they are not seeing patient's in office yet.  So it would be several months before we even get an appointment.  They aren't the best to refer to w/o a pandemic, but it's worse now.  I know all of this because I have 3 in waiting now that have been waiting since around March.

## 2020-05-19 NOTE — TELEPHONE ENCOUNTER
[facility] sent to [facility] & it will be several months before it is scheduled

## 2020-05-19 NOTE — TELEPHONE ENCOUNTER
Oh wow, okay. She does not have many other options so she will have to wait. Will you let her know this when you send the referral over so she knows what to expect? Thanks

## 2020-06-11 ENCOUNTER — OFFICE VISIT (OUTPATIENT)
Dept: INTERNAL MEDICINE | Facility: CLINIC | Age: 60
End: 2020-06-11

## 2020-06-11 ENCOUNTER — TELEPHONE (OUTPATIENT)
Dept: INTERNAL MEDICINE | Facility: CLINIC | Age: 60
End: 2020-06-11

## 2020-06-11 VITALS
OXYGEN SATURATION: 98 % | WEIGHT: 170.4 LBS | SYSTOLIC BLOOD PRESSURE: 112 MMHG | DIASTOLIC BLOOD PRESSURE: 76 MMHG | BODY MASS INDEX: 28.36 KG/M2 | HEART RATE: 82 BPM

## 2020-06-11 DIAGNOSIS — H60.543 ACUTE ECZEMATOID OTITIS EXTERNA OF BOTH EARS: Primary | ICD-10-CM

## 2020-06-11 PROCEDURE — 99213 OFFICE O/P EST LOW 20 MIN: CPT | Performed by: PHYSICIAN ASSISTANT

## 2020-06-11 RX ORDER — METHYLPREDNISOLONE 4 MG/1
TABLET ORAL
Qty: 21 TABLET | Refills: 0 | Status: SHIPPED | OUTPATIENT
Start: 2020-06-11 | End: 2021-07-15

## 2020-06-11 RX ORDER — NEOMYCIN SULFATE, POLYMYXIN B SULFATE AND HYDROCORTISONE 10; 3.5; 1 MG/ML; MG/ML; [USP'U]/ML
3 SUSPENSION/ DROPS AURICULAR (OTIC) 4 TIMES DAILY
Qty: 10 ML | Refills: 0 | Status: SHIPPED | OUTPATIENT
Start: 2020-06-11 | End: 2021-07-15

## 2020-06-11 RX ORDER — ASPIRIN 81 MG/1
81 TABLET ORAL DAILY
COMMUNITY
End: 2022-06-02

## 2020-06-11 NOTE — TELEPHONE ENCOUNTER
[facility] CALLED STATING THAT  ciprofloxacin-hydrocortisone (Cipro HC) 0.2-1 % otic suspension IS TOO EXPENSIVE FOR THE PT. PHARMACY ASKED IF SOMETHING ELSE COULD BE CALLED IN.    PHARMACY CONTACT [phone]    PHARMACY VERIFIED [facility] [address] [place] [address] [address] [facility] & [address] - [phone] PH - [phone] FX

## 2020-06-11 NOTE — PROGRESS NOTES
Chief Complaint   Patient presents with   • Earache       Subjective   Sherin Azul is a 60 y.o. female.       History of Present Illness     Pt is having recurrence of ear symptoms she has had before. Has intermittent drainage out of both her ears, feels like a thin, warm liquid that drains onto her pillow at night. Has flaky skin in her outer ear. Tried some sweet oil, neosporin, has tried clobetesol and it causes burning. Has discomfort in her bilateral neck, feels like her ears are not draining. Hearing is decreased. Saw ENT years ago.        Current Outpatient Medications:   •  Acetaminophen (TYLENOL ARTHRITIS EXT RELIEF PO), Take 2 tablets by mouth 3 (Three) Times a Day., Disp: , Rfl:   •  aspirin 81 MG EC tablet, Take 81 mg by mouth Daily., Disp: , Rfl:   •  clobetasol (TEMOVATE) 0.05 % ointment, Apply  topically to the appropriate area as directed 2 (Two) Times a Day., Disp: 60 g, Rfl: 1  •  cyclobenzaprine (FLEXERIL) 10 MG tablet, Take 1 tablet by mouth 3 (Three) Times a Day As Needed for Muscle Spasms., Disp: 30 tablet, Rfl: 3  •  docusate sodium (STOOL SOFTENER) 100 MG capsule, Take  by mouth., Disp: , Rfl:   •  omeprazole (priLOSEC) 20 MG capsule, One po qd, Disp: 30 capsule, Rfl: 11  •  methylPREDNISolone (Medrol) 4 MG tablet, follow package directions, Disp: 21 tablet, Rfl: 0  •  neomycin-polymyxin-hydrocortisone (CORTISPORIN) 3.5-38994-1 otic suspension, Administer 3 drops into both ears 4 (Four) Times a Day., Disp: 10 mL, Rfl: 0     PMFSH  The following portions of the patient's history were reviewed and updated as appropriate: allergies, current medications, past family history, past medical history, past social history, past surgical history and problem list.    Review of Systems   Constitutional: Negative for activity change, appetite change and fatigue.   HENT: Positive for ear discharge and ear pain. Negative for congestion, postnasal drip and rhinorrhea.    Respiratory: Negative for  chest tightness and shortness of breath.    Cardiovascular: Negative for chest pain and palpitations.   Gastrointestinal: Negative for abdominal pain.   Genitourinary: Negative for dysuria.   Musculoskeletal: Negative for arthralgias and myalgias.   Neurological: Negative for dizziness, weakness, light-headedness and headaches.   Psychiatric/Behavioral: Negative for dysphoric mood. The patient is not nervous/anxious.        Objective   /76   Pulse 82   Wt 77.3 kg (170 lb 6.4 oz)   SpO2 98%   Breastfeeding No   BMI 28.36 kg/m²     Physical Exam   Constitutional: She appears well-developed and well-nourished.   HENT:   Head: Normocephalic.   Right Ear: Hearing, tympanic membrane, external ear and ear canal normal.   Left Ear: Hearing, tympanic membrane, external ear and ear canal normal.   Nose: Nose normal.   Mouth/Throat: Oropharynx is clear and moist.   Flaky, dry skin on bilateral outer ears; inflamed bilateral ear canals   Eyes: Pupils are equal, round, and reactive to light. Conjunctivae are normal.   Neck: Normal range of motion.   Cardiovascular: Normal rate, regular rhythm and normal heart sounds.   Pulmonary/Chest: Effort normal and breath sounds normal. She has no decreased breath sounds. She has no wheezes. She has no rhonchi. She has no rales.   Musculoskeletal: Normal range of motion.   Neurological: She is alert.   Skin: Skin is warm and dry.   Psychiatric: She has a normal mood and affect. Her behavior is normal.   Nursing note and vitals reviewed.           ASSESSMENT/PLAN    Problem List Items Addressed This Visit     None      Visit Diagnoses     Acute eczematoid otitis externa of both ears    -  Primary    Trial of oral steroid and topical abx/steroid drop to treat any psoriasis component. Refer to ENT if not resolving.    Relevant Medications    methylPREDNISolone (Medrol) 4 MG tablet               Return if symptoms worsen or fail to improve.

## 2020-06-22 ENCOUNTER — HOSPITAL ENCOUNTER (OUTPATIENT)
Dept: BONE DENSITY | Facility: HOSPITAL | Age: 60
Discharge: HOME | End: 2020-06-22
Admitting: PHYSICIAN ASSISTANT

## 2020-06-22 DIAGNOSIS — Z78.0 POST-MENOPAUSAL: ICD-10-CM

## 2020-06-22 PROCEDURE — 77080 DXA BONE DENSITY AXIAL: CPT

## 2020-06-23 NOTE — PROGRESS NOTES
Dear Ms. Latha,    Thank you for obtaining your DEXA (bone density) scan.  I have received those results and would like to review them with you.      Your bone density remains in the normal range.     Please maintain regular calcium and vitamin D supplementation.  Calcium intake should be 1000mg per day between diet and supplements.  Weight bearing exercise is good for bone health as well.    We should plan to repeat your DEXA in 3 years.  Please let me know if you have any questions or concerns regarding these results.        Sincerely,  MILKA Weaver

## 2020-09-17 ENCOUNTER — TRANSCRIBE ORDERS (OUTPATIENT)
Dept: ADMINISTRATIVE | Facility: HOSPITAL | Age: 60
End: 2020-09-17

## 2020-09-17 DIAGNOSIS — Z12.31 VISIT FOR SCREENING MAMMOGRAM: Primary | ICD-10-CM

## 2020-10-12 DIAGNOSIS — E78.5 DYSLIPIDEMIA: ICD-10-CM

## 2020-10-12 NOTE — TELEPHONE ENCOUNTER
According the patient office notes on 6/11 she stated she was no longer taking pravastatin but patient called today and requested a refill.     Patient would like a call if the medication has been approved or not you can reach her at [phone]

## 2020-10-13 ENCOUNTER — APPOINTMENT (OUTPATIENT)
Dept: PREADMISSION TESTING | Facility: HOSPITAL | Age: 60
End: 2020-10-13

## 2020-10-13 RX ORDER — PRAVASTATIN SODIUM 10 MG
10 TABLET ORAL DAILY
Qty: 90 TABLET | Refills: 3 | Status: SHIPPED | OUTPATIENT
Start: 2020-10-13 | End: 2021-11-04 | Stop reason: SDUPTHER

## 2020-10-22 ENCOUNTER — TELEPHONE (OUTPATIENT)
Dept: INTERNAL MEDICINE | Facility: CLINIC | Age: 60
End: 2020-10-22

## 2020-10-22 DIAGNOSIS — E78.5 DYSLIPIDEMIA: ICD-10-CM

## 2020-10-22 RX ORDER — PRAVASTATIN SODIUM 10 MG
10 TABLET ORAL DAILY
Qty: 90 TABLET | Refills: 3 | Status: CANCELLED | OUTPATIENT
Start: 2020-10-22

## 2020-10-22 NOTE — TELEPHONE ENCOUNTER
Called patient and advised to call Jacob to see if she can refill early since RX was sent in with refills.

## 2020-10-22 NOTE — TELEPHONE ENCOUNTER
Caller: Sherin Azul    Relationship: Self    Best call back number:   [phone]    Medication needed:   pravastatin (PRAVACHOL) 10 MG tablet      When do you need the refill by:   As soon as possible    What details did the patient provide when requesting the medication:   PATIENT BELIEVES THAT SHE MAY HAVE ACCIDENTALLY THROWN HER PRESCRIPTION AWAY. SHE CANNOT FIND IT ANYWHERE. SHE WOULD LIKE TO KNOW IF A REPLACEMENT CAN BE CALLED IN TO HER PHARMACY.   Patient is completely out of medication.     Does the patient have less than a 3 day supply:  [x] Yes  [] No    What is the patient's preferred pharmacy:    [facility] [address] AT [facility] [address] - [phone]  - [phone] FX

## 2020-10-28 DIAGNOSIS — Z12.11 SCREENING FOR COLON CANCER: Primary | ICD-10-CM

## 2020-10-28 RX ORDER — SODIUM, POTASSIUM,MAG SULFATES 17.5-3.13G
1 SOLUTION, RECONSTITUTED, ORAL ORAL TAKE AS DIRECTED
Qty: 2 BOTTLE | Refills: 0 | Status: SHIPPED | OUTPATIENT
Start: 2020-10-28 | End: 2021-09-13 | Stop reason: SDUPTHER

## 2020-11-03 ENCOUNTER — APPOINTMENT (OUTPATIENT)
Dept: PREADMISSION TESTING | Facility: HOSPITAL | Age: 60
End: 2020-11-03

## 2020-11-21 PROCEDURE — U0004 COV-19 TEST NON-CDC HGH THRU: HCPCS | Performed by: FAMILY MEDICINE

## 2020-12-01 ENCOUNTER — APPOINTMENT (OUTPATIENT)
Dept: MAMMOGRAPHY | Facility: HOSPITAL | Age: 60
End: 2020-12-01

## 2020-12-23 ENCOUNTER — IMMUNIZATION (OUTPATIENT)
Dept: VACCINE CLINIC | Facility: HOSPITAL | Age: 60
End: 2020-12-23

## 2020-12-23 PROCEDURE — 91300 HC SARSCOV02 VAC 30MCG/0.3ML IM: CPT

## 2020-12-23 PROCEDURE — 0001A: CPT

## 2021-01-13 ENCOUNTER — IMMUNIZATION (OUTPATIENT)
Dept: VACCINE CLINIC | Facility: HOSPITAL | Age: 61
End: 2021-01-13

## 2021-01-13 PROCEDURE — 0002A: CPT | Performed by: INTERNAL MEDICINE

## 2021-01-13 PROCEDURE — 91300 HC SARSCOV02 VAC 30MCG/0.3ML IM: CPT | Performed by: INTERNAL MEDICINE

## 2021-01-13 PROCEDURE — 0001A: CPT | Performed by: INTERNAL MEDICINE

## 2021-03-02 ENCOUNTER — LAB (OUTPATIENT)
Dept: LAB | Facility: HOSPITAL | Age: 61
End: 2021-03-02

## 2021-03-02 ENCOUNTER — TRANSCRIBE ORDERS (OUTPATIENT)
Dept: LAB | Facility: HOSPITAL | Age: 61
End: 2021-03-02

## 2021-03-02 DIAGNOSIS — Z51.81 ENCOUNTER FOR THERAPEUTIC DRUG MONITORING: ICD-10-CM

## 2021-03-02 DIAGNOSIS — L40.9 PSORIASIS: Primary | ICD-10-CM

## 2021-03-02 DIAGNOSIS — L40.9 PSORIASIS: ICD-10-CM

## 2021-03-02 LAB
25(OH)D3 SERPL-MCNC: 54 NG/ML
BASOPHILS # BLD AUTO: 0.05 10*3/MM3 (ref 0–0.2)
BASOPHILS NFR BLD AUTO: 0.6 % (ref 0–1.5)
DEPRECATED RDW RBC AUTO: 47.4 FL (ref 37–54)
EOSINOPHIL # BLD AUTO: 0.15 10*3/MM3 (ref 0–0.4)
EOSINOPHIL NFR BLD AUTO: 1.9 % (ref 0.3–6.2)
ERYTHROCYTE [DISTWIDTH] IN BLOOD BY AUTOMATED COUNT: 13.4 % (ref 12.3–15.4)
ERYTHROCYTE [SEDIMENTATION RATE] IN BLOOD: 7 MM/HR (ref 0–30)
HCT VFR BLD AUTO: 41.4 % (ref 34–46.6)
HGB BLD-MCNC: 13.9 G/DL (ref 12–15.9)
IMM GRANULOCYTES # BLD AUTO: 0.04 10*3/MM3 (ref 0–0.05)
IMM GRANULOCYTES NFR BLD AUTO: 0.5 % (ref 0–0.5)
LYMPHOCYTES # BLD AUTO: 2.9 10*3/MM3 (ref 0.7–3.1)
LYMPHOCYTES NFR BLD AUTO: 36.8 % (ref 19.6–45.3)
MCH RBC QN AUTO: 32.8 PG (ref 26.6–33)
MCHC RBC AUTO-ENTMCNC: 33.6 G/DL (ref 31.5–35.7)
MCV RBC AUTO: 97.6 FL (ref 79–97)
MONOCYTES # BLD AUTO: 0.61 10*3/MM3 (ref 0.1–0.9)
MONOCYTES NFR BLD AUTO: 7.8 % (ref 5–12)
NEUTROPHILS NFR BLD AUTO: 4.12 10*3/MM3 (ref 1.7–7)
NEUTROPHILS NFR BLD AUTO: 52.4 % (ref 42.7–76)
NRBC BLD AUTO-RTO: 0 /100 WBC (ref 0–0.2)
PLATELET # BLD AUTO: 237 10*3/MM3 (ref 140–450)
PMV BLD AUTO: 8.7 FL (ref 6–12)
RBC # BLD AUTO: 4.24 10*6/MM3 (ref 3.77–5.28)
WBC # BLD AUTO: 7.87 10*3/MM3 (ref 3.4–10.8)

## 2021-03-02 PROCEDURE — 80053 COMPREHEN METABOLIC PANEL: CPT

## 2021-03-02 PROCEDURE — 86140 C-REACTIVE PROTEIN: CPT

## 2021-03-02 PROCEDURE — 36415 COLL VENOUS BLD VENIPUNCTURE: CPT

## 2021-03-02 PROCEDURE — 82306 VITAMIN D 25 HYDROXY: CPT

## 2021-03-02 PROCEDURE — 85025 COMPLETE CBC W/AUTO DIFF WBC: CPT

## 2021-03-02 PROCEDURE — 85652 RBC SED RATE AUTOMATED: CPT

## 2021-03-03 LAB
ALBUMIN SERPL-MCNC: 4.4 G/DL (ref 3.5–5.2)
ALBUMIN/GLOB SERPL: 1.4 G/DL
ALP SERPL-CCNC: 87 U/L (ref 39–117)
ALT SERPL W P-5'-P-CCNC: 47 U/L (ref 1–33)
ANION GAP SERPL CALCULATED.3IONS-SCNC: 10 MMOL/L (ref 5–15)
AST SERPL-CCNC: 46 U/L (ref 1–32)
BILIRUB SERPL-MCNC: 0.2 MG/DL (ref 0–1.2)
BUN SERPL-MCNC: 15 MG/DL (ref 6–20)
BUN/CREAT SERPL: 31.3 (ref 7–25)
CALCIUM SPEC-SCNC: 9.6 MG/DL (ref 8.6–10.5)
CHLORIDE SERPL-SCNC: 101 MMOL/L (ref 98–107)
CO2 SERPL-SCNC: 26 MMOL/L (ref 22–29)
CREAT SERPL-MCNC: 0.48 MG/DL (ref 0.57–1)
CRP SERPL-MCNC: <0.3 MG/DL (ref 0–0.5)
GFR SERPL CREATININE-BSD FRML MDRD: 133 ML/MIN/1.73
GLOBULIN UR ELPH-MCNC: 3.1 GM/DL
GLUCOSE SERPL-MCNC: 102 MG/DL (ref 65–99)
POTASSIUM SERPL-SCNC: 4.2 MMOL/L (ref 3.5–5.2)
PROT SERPL-MCNC: 7.5 G/DL (ref 6–8.5)
SODIUM SERPL-SCNC: 137 MMOL/L (ref 136–145)

## 2021-03-19 ENCOUNTER — TELEPHONE (OUTPATIENT)
Dept: INTERNAL MEDICINE | Facility: CLINIC | Age: 61
End: 2021-03-19

## 2021-03-19 NOTE — TELEPHONE ENCOUNTER
Caller: Sherin Azul    Relationship: Self    Best call back number: [phone]    What orders are you requesting (i.e. 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

## 2021-03-25 NOTE — TELEPHONE ENCOUNTER
Spoke with patient today, she states she took Azo and seemed to help her symptoms, did go ahead had schedule a physical

## 2021-06-21 ENCOUNTER — TRANSCRIBE ORDERS (OUTPATIENT)
Dept: LAB | Facility: HOSPITAL | Age: 61
End: 2021-06-21

## 2021-06-21 ENCOUNTER — LAB (OUTPATIENT)
Dept: LAB | Facility: HOSPITAL | Age: 61
End: 2021-06-21

## 2021-06-21 DIAGNOSIS — Z51.81 ENCOUNTER FOR THERAPEUTIC DRUG MONITORING: ICD-10-CM

## 2021-06-21 DIAGNOSIS — Z51.81 ENCOUNTER FOR THERAPEUTIC DRUG MONITORING: Primary | ICD-10-CM

## 2021-06-21 DIAGNOSIS — Z15.89 HLA-B27 POSITIVE: ICD-10-CM

## 2021-06-21 DIAGNOSIS — L40.50 PSORIATIC ARTHRITIS (HCC): ICD-10-CM

## 2021-06-21 LAB
ALBUMIN SERPL-MCNC: 4.2 G/DL (ref 3.5–5.2)
ALBUMIN/GLOB SERPL: 1.7 G/DL
ALP SERPL-CCNC: 70 U/L (ref 39–117)
ALT SERPL W P-5'-P-CCNC: 17 U/L (ref 1–33)
ANION GAP SERPL CALCULATED.3IONS-SCNC: 10.8 MMOL/L (ref 5–15)
AST SERPL-CCNC: 18 U/L (ref 1–32)
BASOPHILS # BLD AUTO: 0.01 10*3/MM3 (ref 0–0.2)
BASOPHILS NFR BLD AUTO: 0.2 % (ref 0–1.5)
BILIRUB SERPL-MCNC: <0.2 MG/DL (ref 0–1.2)
BUN SERPL-MCNC: 15 MG/DL (ref 6–20)
BUN/CREAT SERPL: 25.9 (ref 7–25)
CALCIUM SPEC-SCNC: 8.8 MG/DL (ref 8.6–10.5)
CHLORIDE SERPL-SCNC: 105 MMOL/L (ref 98–107)
CO2 SERPL-SCNC: 25.2 MMOL/L (ref 22–29)
CREAT SERPL-MCNC: 0.58 MG/DL (ref 0.57–1)
CRP SERPL-MCNC: <0.3 MG/DL (ref 0–0.5)
DEPRECATED RDW RBC AUTO: 47.7 FL (ref 37–54)
EOSINOPHIL # BLD AUTO: 0.05 10*3/MM3 (ref 0–0.4)
EOSINOPHIL NFR BLD AUTO: 0.8 % (ref 0.3–6.2)
ERYTHROCYTE [DISTWIDTH] IN BLOOD BY AUTOMATED COUNT: 13.3 % (ref 12.3–15.4)
ERYTHROCYTE [SEDIMENTATION RATE] IN BLOOD: 12 MM/HR (ref 0–30)
GFR SERPL CREATININE-BSD FRML MDRD: 106 ML/MIN/1.73
GLOBULIN UR ELPH-MCNC: 2.5 GM/DL
GLUCOSE SERPL-MCNC: 94 MG/DL (ref 65–99)
HCT VFR BLD AUTO: 42.4 % (ref 34–46.6)
HGB BLD-MCNC: 14.7 G/DL (ref 12–15.9)
IMM GRANULOCYTES # BLD AUTO: 0.03 10*3/MM3 (ref 0–0.05)
IMM GRANULOCYTES NFR BLD AUTO: 0.5 % (ref 0–0.5)
LYMPHOCYTES # BLD AUTO: 2.25 10*3/MM3 (ref 0.7–3.1)
LYMPHOCYTES NFR BLD AUTO: 33.9 % (ref 19.6–45.3)
MCH RBC QN AUTO: 33.7 PG (ref 26.6–33)
MCHC RBC AUTO-ENTMCNC: 34.7 G/DL (ref 31.5–35.7)
MCV RBC AUTO: 97.2 FL (ref 79–97)
MONOCYTES # BLD AUTO: 0.63 10*3/MM3 (ref 0.1–0.9)
MONOCYTES NFR BLD AUTO: 9.5 % (ref 5–12)
NEUTROPHILS NFR BLD AUTO: 3.67 10*3/MM3 (ref 1.7–7)
NEUTROPHILS NFR BLD AUTO: 55.1 % (ref 42.7–76)
NRBC BLD AUTO-RTO: 0 /100 WBC (ref 0–0.2)
PLATELET # BLD AUTO: 169 10*3/MM3 (ref 140–450)
PMV BLD AUTO: 9.1 FL (ref 6–12)
POTASSIUM SERPL-SCNC: 4.1 MMOL/L (ref 3.5–5.2)
PROT SERPL-MCNC: 6.7 G/DL (ref 6–8.5)
RBC # BLD AUTO: 4.36 10*6/MM3 (ref 3.77–5.28)
SODIUM SERPL-SCNC: 141 MMOL/L (ref 136–145)
WBC # BLD AUTO: 6.64 10*3/MM3 (ref 3.4–10.8)

## 2021-06-21 PROCEDURE — 85025 COMPLETE CBC W/AUTO DIFF WBC: CPT

## 2021-06-21 PROCEDURE — 80053 COMPREHEN METABOLIC PANEL: CPT

## 2021-06-21 PROCEDURE — 86140 C-REACTIVE PROTEIN: CPT

## 2021-06-21 PROCEDURE — 85652 RBC SED RATE AUTOMATED: CPT

## 2021-06-21 PROCEDURE — 36415 COLL VENOUS BLD VENIPUNCTURE: CPT

## 2021-07-06 ENCOUNTER — TRANSCRIBE ORDERS (OUTPATIENT)
Dept: ADMINISTRATIVE | Facility: HOSPITAL | Age: 61
End: 2021-07-06

## 2021-07-06 DIAGNOSIS — Z12.31 VISIT FOR SCREENING MAMMOGRAM: Primary | ICD-10-CM

## 2021-07-15 ENCOUNTER — OFFICE VISIT (OUTPATIENT)
Dept: INTERNAL MEDICINE | Facility: CLINIC | Age: 61
End: 2021-07-15

## 2021-07-15 ENCOUNTER — LAB (OUTPATIENT)
Dept: LAB | Facility: HOSPITAL | Age: 61
End: 2021-07-15

## 2021-07-15 VITALS
DIASTOLIC BLOOD PRESSURE: 80 MMHG | HEART RATE: 68 BPM | OXYGEN SATURATION: 94 % | BODY MASS INDEX: 26.89 KG/M2 | SYSTOLIC BLOOD PRESSURE: 122 MMHG | WEIGHT: 161.6 LBS

## 2021-07-15 DIAGNOSIS — E78.5 DYSLIPIDEMIA: Primary | ICD-10-CM

## 2021-07-15 DIAGNOSIS — F17.200 CURRENT EVERY DAY SMOKER: ICD-10-CM

## 2021-07-15 DIAGNOSIS — Z12.11 COLON CANCER SCREENING: ICD-10-CM

## 2021-07-15 DIAGNOSIS — E78.5 DYSLIPIDEMIA: ICD-10-CM

## 2021-07-15 LAB
CHOLEST SERPL-MCNC: 155 MG/DL (ref 0–200)
HDLC SERPL-MCNC: 43 MG/DL (ref 40–60)
LDLC SERPL CALC-MCNC: 96 MG/DL (ref 0–100)
LDLC/HDLC SERPL: 2.2 {RATIO}
TRIGL SERPL-MCNC: 87 MG/DL (ref 0–150)
VLDLC SERPL-MCNC: 16 MG/DL (ref 5–40)

## 2021-07-15 PROCEDURE — 99214 OFFICE O/P EST MOD 30 MIN: CPT | Performed by: PHYSICIAN ASSISTANT

## 2021-07-15 PROCEDURE — 80061 LIPID PANEL: CPT

## 2021-07-15 RX ORDER — VARENICLINE TARTRATE 1 MG/1
1 TABLET, FILM COATED ORAL 2 TIMES DAILY
Qty: 56 TABLET | Refills: 4 | Status: SHIPPED | OUTPATIENT
Start: 2021-08-12 | End: 2021-07-20

## 2021-07-15 RX ORDER — VARENICLINE TARTRATE 1 MG/1
1 TABLET, FILM COATED ORAL DAILY
Qty: 30 TABLET | Refills: 6 | Status: CANCELLED | OUTPATIENT
Start: 2021-07-15

## 2021-07-15 RX ORDER — HYDROCODONE BITARTRATE AND ACETAMINOPHEN 5; 325 MG/1; MG/1
TABLET ORAL
COMMUNITY
Start: 2021-06-28

## 2021-07-15 NOTE — PROGRESS NOTES
Chief Complaint   Patient presents with   • Review labs from Rheum     Follow Up   • Get colonoscopy scheduled   • Med Refill       Subjective     Sherin Azul is a 61 y.o. female.        History of Present Illness     Pt had labs done by Rheumatology recently, her liver enzymes are back to normal.    Would like to try chantix to help her quit smoking. Has had recent stress and restarted.    Needs new order for colonoscopy. Never went with previous order.        Current Outpatient Medications:   •  Acetaminophen (TYLENOL ARTHRITIS EXT RELIEF PO), Take 2 tablets by mouth 3 (Three) Times a Day., Disp: , Rfl:   •  alclometasone (ACLOVATE) 0.05 % cream, Apply to the ears and affected areas in small amounts at bedtime as needed for no longer than 3-4 days at a time., Disp: 60 g, Rfl: 2  •  aspirin 81 MG EC tablet, Take 81 mg by mouth Daily., Disp: , Rfl:   •  clobetasol (TEMOVATE) 0.05 % ointment, Apply  topically to the appropriate area as directed 2 (Two) Times a Day., Disp: 60 g, Rfl: 1  •  clobetasol (TEMOVATE) 0.05 % ointment, For the ear: Apply at bedtime x5-6 days then change to alclometasone. For the knee: Apply at bedtime x2-3 days as needed., Disp: 60 g, Rfl: 1  •  clobetasol propionate (CLOBEX) 0.05 % shampoo, Massage into scalp 15 min. before rinsing every night at bedtime x1 month, then decrease to 2 times per week as directed., Disp: 118 mL, Rfl: 2  •  cyclobenzaprine (FLEXERIL) 10 MG tablet, Take 1 tablet by mouth 3 (Three) Times a Day As Needed for Muscle Spasms., Disp: 30 tablet, Rfl: 3  •  docusate sodium (STOOL SOFTENER) 100 MG capsule, Take  by mouth., Disp: , Rfl:   •  folic acid (FOLVITE) 1 MG tablet, Take 1 tablet by mouth Daily as directed., Disp: 90 tablet, Rfl: 1  •  Methotrexate Sodium (methotrexate PF) 50 MG/2ML chemo syringe, Inject 0.8 mL under the skin into the appropriate area as directed 1 (One) Time Per Week. DO NOT RE-USE VIALS, Disp: 24 mL, Rfl: 1  •  omeprazole (priLOSEC)  "20 MG capsule, One po qd, Disp: 30 capsule, Rfl: 11  •  pravastatin (PRAVACHOL) 10 MG tablet, Take 1 tablet by mouth Daily., Disp: 90 tablet, Rfl: 3  •  sodium-potassium-magnesium sulfates (Suprep Bowel Prep Kit) 17.5-3.13-1.6 GM/177ML solution oral solution, Take 1 bottle by mouth Take As Directed. Follow instructions that were mailed to your home. If you didn't receive these call [phone]., Disp: 2 bottle, Rfl: 0  •  Syringe/Needle, Disp, (Easy Touch Safety Syringe) 25G X 5/8\" 1 ML misc, USE AS DIRECTED., Disp: 12 each, Rfl: 0  •  Tuberculin Syringe 25G X 5/8\" 1 ML misc, Use as directed, Disp: 4 each, Rfl: 2  •  HYDROcodone-acetaminophen (NORCO) 5-325 MG per tablet, , Disp: , Rfl:   •  varenicline (CHANTIX JOAN) 0.5 MG X 11 & 1 MG X 42 tablet, Take 0.5 mg po daily x 3 days, then 0.5 mg po bid x 4 days, then 1 mg po bid, Disp: 53 tablet, Rfl: 0  •  [START ON 8/12/2021] varenicline (CHANTIX) 1 MG tablet, Take 1 tablet by mouth 2 (Two) Times a Day for 140 days., Disp: 56 tablet, Rfl: 4     PMFSH  The following portions of the patient's history were reviewed and updated as appropriate: allergies, current medications, past family history, past medical history, past social history, past surgical history and problem list.    Review of Systems   Constitutional: Negative for activity change, appetite change and fatigue.   HENT: Negative for congestion and rhinorrhea.    Respiratory: Negative for chest tightness and shortness of breath.    Cardiovascular: Negative for chest pain and palpitations.   Gastrointestinal: Negative for abdominal pain.   Genitourinary: Negative for dysuria.   Musculoskeletal: Negative for arthralgias and myalgias.   Neurological: Negative for dizziness, weakness, light-headedness and headaches.   Psychiatric/Behavioral: Negative for dysphoric mood. The patient is not nervous/anxious.        Objective   /80   Pulse 68   Wt 73.3 kg (161 lb 9.6 oz)   SpO2 94%   BMI 26.89 kg/m² "     Physical Exam  Vitals and nursing note reviewed.   Constitutional:       Appearance: She is well-developed.   HENT:      Head: Normocephalic and atraumatic.      Right Ear: External ear normal.      Left Ear: External ear normal.   Eyes:      Conjunctiva/sclera: Conjunctivae normal.   Cardiovascular:      Rate and Rhythm: Normal rate and regular rhythm.   Pulmonary:      Effort: Pulmonary effort is normal.      Breath sounds: Normal breath sounds.   Musculoskeletal:         General: Normal range of motion.      Cervical back: Normal range of motion.   Skin:     General: Skin is warm and dry.   Psychiatric:         Behavior: Behavior normal.              ASSESSMENT/PLAN    Diagnoses and all orders for this visit:    1. Dyslipidemia (Primary)  Assessment & Plan:  Check lipid profile. Further recommendations based on results.      Orders:  -     Lipid Panel; Future    2. Current every day smoker  Assessment & Plan:  Smoking Cessation Counseling  DX: tobacco abuse    I advised the patient of the risks of continuing to use tobacco, and I offered smoking cessation materials as well as reviewed strategies and medications that could assist in quitting smoking.     Patient expresses willingness to work on quitting.  Patient remains in precontemplation.    willing to quit. We have discussed the following method/s for tobacco cessation:  Prescription Medicaiton.  Together we have set a quit date for 1 week from today.  She will follow up with me in a few months or sooner to check on her progress  I advised patient to quit, and offered support.  Discussed current use pattern.  I spent approximately 5 minutes counseling the patient.  Chantix ordered.  Barriers: under a lot of stress now  Time spent counseling: > 3-10 minutes      Orders:  -     varenicline (CHANTIX JOAN) 0.5 MG X 11 & 1 MG X 42 tablet; Take 0.5 mg po daily x 3 days, then 0.5 mg po bid x 4 days, then 1 mg po bid  Dispense: 53 tablet; Refill: 0  -      varenicline (CHANTIX) 1 MG tablet; Take 1 tablet by mouth 2 (Two) Times a Day for 140 days.  Dispense: 56 tablet; Refill: 4    3. Colon cancer screening  -     Ambulatory Referral For Screening Colonoscopy    Other orders  -     Cancel: varenicline (CHANTIX) 1 MG tablet; Take 1 tablet by mouth Daily.  Dispense: 30 tablet; Refill: 6           Return in about 6 months (around 1/15/2022) for Annual with fasting labs.

## 2021-07-19 NOTE — ASSESSMENT & PLAN NOTE
Smoking Cessation Counseling  DX: tobacco abuse    I advised the patient of the risks of continuing to use tobacco, and I offered smoking cessation materials as well as reviewed strategies and medications that could assist in quitting smoking.     Patient expresses willingness to work on quitting.  Patient remains in precontemplation.    willing to quit. We have discussed the following method/s for tobacco cessation:  Prescription Medicaiton.  Together we have set a quit date for 1 week from today.  She will follow up with me in a few months or sooner to check on her progress  I advised patient to quit, and offered support.  Discussed current use pattern.  I spent approximately 5 minutes counseling the patient.  Chantix ordered.  Barriers: under a lot of stress now  Time spent counseling: > 3-10 minutes

## 2021-07-20 ENCOUNTER — OFFICE VISIT (OUTPATIENT)
Dept: INTERNAL MEDICINE | Facility: CLINIC | Age: 61
End: 2021-07-20

## 2021-07-20 ENCOUNTER — TELEPHONE (OUTPATIENT)
Dept: INTERNAL MEDICINE | Facility: CLINIC | Age: 61
End: 2021-07-20

## 2021-07-20 ENCOUNTER — HOSPITAL ENCOUNTER (OUTPATIENT)
Dept: GENERAL RADIOLOGY | Facility: HOSPITAL | Age: 61
Discharge: HOME | End: 2021-07-20
Admitting: PHYSICIAN ASSISTANT

## 2021-07-20 VITALS
SYSTOLIC BLOOD PRESSURE: 130 MMHG | HEART RATE: 76 BPM | OXYGEN SATURATION: 96 % | DIASTOLIC BLOOD PRESSURE: 84 MMHG | BODY MASS INDEX: 26.79 KG/M2 | WEIGHT: 161 LBS

## 2021-07-20 DIAGNOSIS — S99.912A INJURY OF LEFT ANKLE, INITIAL ENCOUNTER: ICD-10-CM

## 2021-07-20 DIAGNOSIS — S99.912A INJURY OF LEFT ANKLE, INITIAL ENCOUNTER: Primary | ICD-10-CM

## 2021-07-20 PROCEDURE — 73610 X-RAY EXAM OF ANKLE: CPT

## 2021-07-20 PROCEDURE — 99213 OFFICE O/P EST LOW 20 MIN: CPT | Performed by: PHYSICIAN ASSISTANT

## 2021-07-20 NOTE — TELEPHONE ENCOUNTER
Caller: Sherin Azul    Relationship: Self    Best call back number:[phone]     What was the call regarding: PATIENT CALLED WANTING TO SPEAK WITH THE NURSE ABOUT GETTING AN XRAY.    Do you require a callback: YES

## 2021-07-20 NOTE — TELEPHONE ENCOUNTER
Caller: Sherin Azul    Relationship: Self    Best call back number: [phone]     Caller requesting test results: PATIENT     What test was performed:  XRAY OF LEFT ANKLE     When was the test performed: 7/20/21    Where was the test performed: [facility]     Additional notes: PATIENT REQUESTING A CALLBACK WITH RESULTS

## 2021-07-20 NOTE — PROGRESS NOTES
Chief Complaint   Patient presents with   • Left ankle Pain     Acute        Subjective     Sherin Azul is a 61 y.o. female.        History of Present Illness     Pt fell 3 days ago, stepped in a hole and twisted her left foot underneath her, her sister heard a pop. Had immediate swelling. [place] and someone did a maneuver that caused a feeling of popping her joint back into place. She continues to have pain in her left later ankle and the back of her calf. She as been icing and elevating. Wearing soft wrap. Bruising started the day after the injury.    She worked last night on her feet, went home early to let her ankle rest.        Current Outpatient Medications:   •  Acetaminophen (TYLENOL ARTHRITIS EXT RELIEF PO), Take 2 tablets by mouth 3 (Three) Times a Day., Disp: , Rfl:   •  alclometasone (ACLOVATE) 0.05 % cream, Apply to the ears and affected areas in small amounts at bedtime as needed for no longer than 3-4 days at a time., Disp: 60 g, Rfl: 2  •  aspirin 81 MG EC tablet, Take 81 mg by mouth Daily., Disp: , Rfl:   •  clobetasol (TEMOVATE) 0.05 % ointment, Apply  topically to the appropriate area as directed 2 (Two) Times a Day., Disp: 60 g, Rfl: 1  •  clobetasol (TEMOVATE) 0.05 % ointment, For the ear: Apply at bedtime x5-6 days then change to alclometasone. For the knee: Apply at bedtime x2-3 days as needed., Disp: 60 g, Rfl: 1  •  clobetasol propionate (CLOBEX) 0.05 % shampoo, Massage into scalp 15 min. before rinsing every night at bedtime x1 month, then decrease to 2 times per week as directed., Disp: 118 mL, Rfl: 2  •  cyclobenzaprine (FLEXERIL) 10 MG tablet, Take 1 tablet by mouth 3 (Three) Times a Day As Needed for Muscle Spasms., Disp: 30 tablet, Rfl: 3  •  docusate sodium (STOOL SOFTENER) 100 MG capsule, Take  by mouth., Disp: , Rfl:   •  folic acid (FOLVITE) 1 MG tablet, Take 1 tablet by mouth Daily as directed., Disp: 90 tablet, Rfl:  "1  •  HYDROcodone-acetaminophen (NORCO) 5-325 MG per tablet, , Disp: , Rfl:   •  Methotrexate Sodium (methotrexate PF) 50 MG/2ML chemo syringe, Inject 0.8 mL under the skin into the appropriate area as directed 1 (One) Time Per Week. DO NOT RE-USE VIALS, Disp: 24 mL, Rfl: 1  •  omeprazole (priLOSEC) 20 MG capsule, One po qd, Disp: 30 capsule, Rfl: 11  •  pravastatin (PRAVACHOL) 10 MG tablet, Take 1 tablet by mouth Daily., Disp: 90 tablet, Rfl: 3  •  sodium-potassium-magnesium sulfates (Suprep Bowel Prep Kit) 17.5-3.13-1.6 GM/177ML solution oral solution, Take 1 bottle by mouth Take As Directed. Follow instructions that were mailed to your home. If you didn't receive these call [phone]., Disp: 2 bottle, Rfl: 0  •  Syringe/Needle, Disp, (Easy Touch Safety Syringe) 25G X 5/8\" 1 ML misc, USE AS DIRECTED., Disp: 12 each, Rfl: 0  •  Tuberculin Syringe 25G X 5/8\" 1 ML misc, Use as directed, Disp: 4 each, Rfl: 2     PMFSH  The following portions of the patient's history were reviewed and updated as appropriate: allergies, current medications, past family history, past medical history, past social history, past surgical history and problem list.    Review of Systems   Constitutional: Negative for fever and unexpected weight change.   HENT: Negative.    Eyes: Negative.    Respiratory: Negative for chest tightness, shortness of breath and wheezing.    Cardiovascular: Negative.    Gastrointestinal: Negative for abdominal pain.   Endocrine: Negative.    Genitourinary: Negative.    Musculoskeletal: Positive for arthralgias.   Skin: Negative for color change, rash and wound.   Allergic/Immunologic: Negative.    Neurological: Negative for seizures, syncope and numbness.   Hematological: Negative for adenopathy. Does not bruise/bleed easily.   Psychiatric/Behavioral: Negative for confusion.       Objective   /84   Pulse 76   Wt 73 kg (161 lb)   SpO2 96%   BMI 26.79 kg/m²     Physical Exam  Vitals and nursing note " reviewed.   Constitutional:       Appearance: She is well-developed.   HENT:      Head: Normocephalic and atraumatic.      Right Ear: External ear normal.      Left Ear: External ear normal.   Eyes:      Conjunctiva/sclera: Conjunctivae normal.   Cardiovascular:      Rate and Rhythm: Normal rate and regular rhythm.   Pulmonary:      Effort: Pulmonary effort is normal.      Breath sounds: Normal breath sounds.   Musculoskeletal:      Cervical back: Normal range of motion.      Left ankle: Swelling and ecchymosis present. Tenderness present over the lateral malleolus. Decreased range of motion.   Skin:     General: Skin is warm and dry.   Psychiatric:         Behavior: Behavior normal.              ASSESSMENT/PLAN    Diagnoses and all orders for this visit:    1. Injury of left ankle, initial encounter (Primary)  Comments:  Check xray of ankle. Refer to Ortho as needed based on results. Continue to wear supportive brace, elevate ankle, ice prn.  Orders:  -     XR Ankle 3+ View Left; Future             Return if symptoms worsen or fail to improve, for Next scheduled follow up.

## 2021-07-20 NOTE — TELEPHONE ENCOUNTER
Pt states she stepped into hole and twisted ankle and is black/blue from toes to ankle, and swollen. Pt would like to have xray ordered. Let pt know may require appt first, please advise

## 2021-07-22 NOTE — PROGRESS NOTES
Reviewed with patient at appointment- improved from last check. Continue current dose of pravastatin.

## 2021-07-23 NOTE — PROGRESS NOTES
Sorry for the delay- the final results took a while to come through. Your xray does not show any evidence of a fracture. Let me know if your pain is not improving and I will order physical therapy.

## 2021-08-05 ENCOUNTER — HOSPITAL ENCOUNTER (OUTPATIENT)
Dept: MAMMOGRAPHY | Facility: HOSPITAL | Age: 61
Discharge: HOME | End: 2021-08-05
Admitting: PHYSICIAN ASSISTANT

## 2021-08-05 DIAGNOSIS — Z12.31 VISIT FOR SCREENING MAMMOGRAM: ICD-10-CM

## 2021-08-05 PROCEDURE — 77063 BREAST TOMOSYNTHESIS BI: CPT

## 2021-08-05 PROCEDURE — 77067 SCR MAMMO BI INCL CAD: CPT

## 2021-08-05 PROCEDURE — 77063 BREAST TOMOSYNTHESIS BI: CPT | Performed by: RADIOLOGY

## 2021-08-05 PROCEDURE — 77067 SCR MAMMO BI INCL CAD: CPT | Performed by: RADIOLOGY

## 2021-09-13 DIAGNOSIS — Z12.11 SCREENING FOR COLON CANCER: ICD-10-CM

## 2021-09-14 RX ORDER — SODIUM, POTASSIUM,MAG SULFATES 17.5-3.13G
1 SOLUTION, RECONSTITUTED, ORAL ORAL TAKE AS DIRECTED
Qty: 354 ML | Refills: 0 | Status: SHIPPED | OUTPATIENT
Start: 2021-09-14 | End: 2022-06-02

## 2021-09-20 ENCOUNTER — LAB (OUTPATIENT)
Dept: LAB | Facility: HOSPITAL | Age: 61
End: 2021-09-20

## 2021-09-20 ENCOUNTER — TRANSCRIBE ORDERS (OUTPATIENT)
Dept: LAB | Facility: HOSPITAL | Age: 61
End: 2021-09-20

## 2021-09-20 DIAGNOSIS — L40.50 PSORIATIC ARTHRITIS (HCC): ICD-10-CM

## 2021-09-20 DIAGNOSIS — Z51.81 ENCOUNTER FOR THERAPEUTIC DRUG MONITORING: ICD-10-CM

## 2021-09-20 DIAGNOSIS — Z51.81 ENCOUNTER FOR THERAPEUTIC DRUG MONITORING: Primary | ICD-10-CM

## 2021-09-20 DIAGNOSIS — Z15.89 HLA B27 POSITIVE: ICD-10-CM

## 2021-09-20 DIAGNOSIS — Z15.89 HLA-B27 POSITIVE: ICD-10-CM

## 2021-09-20 LAB
BASOPHILS # BLD AUTO: 0.05 10*3/MM3 (ref 0–0.2)
BASOPHILS NFR BLD AUTO: 0.5 % (ref 0–1.5)
DEPRECATED RDW RBC AUTO: 45.5 FL (ref 37–54)
EOSINOPHIL # BLD AUTO: 0.26 10*3/MM3 (ref 0–0.4)
EOSINOPHIL NFR BLD AUTO: 2.7 % (ref 0.3–6.2)
ERYTHROCYTE [DISTWIDTH] IN BLOOD BY AUTOMATED COUNT: 13 % (ref 12.3–15.4)
HCT VFR BLD AUTO: 40 % (ref 34–46.6)
HGB BLD-MCNC: 13.7 G/DL (ref 12–15.9)
IMM GRANULOCYTES # BLD AUTO: 0.07 10*3/MM3 (ref 0–0.05)
IMM GRANULOCYTES NFR BLD AUTO: 0.7 % (ref 0–0.5)
LYMPHOCYTES # BLD AUTO: 2.6 10*3/MM3 (ref 0.7–3.1)
LYMPHOCYTES NFR BLD AUTO: 26.7 % (ref 19.6–45.3)
MCH RBC QN AUTO: 33.4 PG (ref 26.6–33)
MCHC RBC AUTO-ENTMCNC: 34.3 G/DL (ref 31.5–35.7)
MCV RBC AUTO: 97.6 FL (ref 79–97)
MONOCYTES # BLD AUTO: 0.9 10*3/MM3 (ref 0.1–0.9)
MONOCYTES NFR BLD AUTO: 9.3 % (ref 5–12)
NEUTROPHILS NFR BLD AUTO: 5.84 10*3/MM3 (ref 1.7–7)
NEUTROPHILS NFR BLD AUTO: 60.1 % (ref 42.7–76)
NRBC BLD AUTO-RTO: 0 /100 WBC (ref 0–0.2)
PLATELET # BLD AUTO: 246 10*3/MM3 (ref 140–450)
PMV BLD AUTO: 9.1 FL (ref 6–12)
RBC # BLD AUTO: 4.1 10*6/MM3 (ref 3.77–5.28)
WBC # BLD AUTO: 9.72 10*3/MM3 (ref 3.4–10.8)

## 2021-09-20 PROCEDURE — 85025 COMPLETE CBC W/AUTO DIFF WBC: CPT

## 2021-09-20 PROCEDURE — 36415 COLL VENOUS BLD VENIPUNCTURE: CPT

## 2021-10-01 ENCOUNTER — OUTSIDE FACILITY SERVICE (OUTPATIENT)
Dept: GASTROENTEROLOGY | Facility: CLINIC | Age: 61
End: 2021-10-01

## 2021-10-01 PROCEDURE — 88305 TISSUE EXAM BY PATHOLOGIST: CPT | Performed by: INTERNAL MEDICINE

## 2021-10-01 PROCEDURE — 45385 COLONOSCOPY W/LESION REMOVAL: CPT | Performed by: INTERNAL MEDICINE

## 2021-10-04 ENCOUNTER — LAB REQUISITION (OUTPATIENT)
Dept: LAB | Facility: HOSPITAL | Age: 61
End: 2021-10-04

## 2021-10-04 DIAGNOSIS — K64.8 OTHER HEMORRHOIDS: ICD-10-CM

## 2021-10-04 DIAGNOSIS — Z12.11 ENCOUNTER FOR SCREENING FOR MALIGNANT NEOPLASM OF COLON: ICD-10-CM

## 2021-10-04 DIAGNOSIS — K63.5 POLYP OF COLON: ICD-10-CM

## 2021-10-04 DIAGNOSIS — K57.30 DIVERTICULOSIS OF LARGE INTESTINE WITHOUT PERFORATION OR ABSCESS WITHOUT BLEEDING: ICD-10-CM

## 2021-10-05 LAB
CYTO UR: NORMAL
LAB AP CASE REPORT: NORMAL
LAB AP CLINICAL INFORMATION: NORMAL
PATH REPORT.FINAL DX SPEC: NORMAL
PATH REPORT.GROSS SPEC: NORMAL

## 2021-10-08 ENCOUNTER — TELEPHONE (OUTPATIENT)
Dept: GASTROENTEROLOGY | Facility: CLINIC | Age: 61
End: 2021-10-08

## 2021-10-08 NOTE — TELEPHONE ENCOUNTER
I called and spoke with Ms. Azul regarding the results of the pathology.  She had 1 adenoma polyp and will need a repeat examination in 5 years.

## 2021-11-04 DIAGNOSIS — E78.5 DYSLIPIDEMIA: ICD-10-CM

## 2021-11-04 RX ORDER — PRAVASTATIN SODIUM 10 MG
10 TABLET ORAL DAILY
Qty: 90 TABLET | Refills: 0 | Status: SHIPPED | OUTPATIENT
Start: 2021-11-04 | End: 2022-02-21

## 2021-11-19 ENCOUNTER — OFFICE VISIT (OUTPATIENT)
Dept: INTERNAL MEDICINE | Facility: CLINIC | Age: 61
End: 2021-11-19

## 2021-11-19 VITALS — BODY MASS INDEX: 26.33 KG/M2 | WEIGHT: 158 LBS | OXYGEN SATURATION: 97 % | HEIGHT: 65 IN | HEART RATE: 72 BPM

## 2021-11-19 DIAGNOSIS — J10.1 INFLUENZA A: ICD-10-CM

## 2021-11-19 DIAGNOSIS — R68.89 FLU-LIKE SYMPTOMS: Primary | ICD-10-CM

## 2021-11-19 LAB
EXPIRATION DATE: ABNORMAL
FLUAV AG NPH QL: POSITIVE
FLUBV AG NPH QL: NEGATIVE
INTERNAL CONTROL: ABNORMAL
Lab: ABNORMAL

## 2021-11-19 PROCEDURE — 87804 INFLUENZA ASSAY W/OPTIC: CPT

## 2021-11-19 PROCEDURE — 99213 OFFICE O/P EST LOW 20 MIN: CPT

## 2021-11-19 NOTE — PROGRESS NOTES
Chief Complaint  Cough, Headache, URI, and Chills    Sherin Azul presents to [facility]    HPI: Cough, headache, chills, body aches, and chest congestion x 3 days. Symptoms have remained persistent. Cough is dry and non-productive. Took jeancarlos seltzer plus with mild improvement in symptoms. Pain with body aches is rated as a 4-5 out of 10.     Subjective       PMSFH  The following portions of the patient's history were reviewed and updated as appropriate: allergies, current medications, past family history, past medical history, past social history, past surgical history and problem list.     Past Medical History:   Diagnosis Date   • Acrochordon    • Acute maxillary sinusitis    • Acute otitis media    • Breast cancer (HCC) 2005    left   • Dermatitis    • Drug therapy 2005   • Eczema    • H/O alcohol abuse    • Herpes    • History of breast cancer    • History of cervical dysplasia    • History of MRSA infection    • Hx of radiation therapy 2005   • Joint pain, hip    • Joint pain, knee    • Osteoarthritis    • Psoriasis    • Scabies    • Tendonitis    • Varicose veins of legs    • Viral syndrome    • Weight gain       Allergies   Allergen Reactions   • Meloxicam Nausea Only      Social History     Tobacco Use   • Smoking status: Former Smoker   • Smokeless tobacco: Never Used   Substance Use Topics   • Alcohol use: No   • Drug use: No     Past Surgical History:   Procedure Laterality Date   • BREAST BIOPSY Left 2005   • BREAST LUMPECTOMY Left 2005    lumpectomy   • LAPAROSCOPIC ASSISTED VAGINAL HYSTERECTOMY SALPINGO OOPHORECTOMY Bilateral    • OOPHORECTOMY        Family History   Problem Relation Age of Onset   • Breast cancer Mother 40   • Breast cancer Sister 43   • Ovarian cancer Sister 40   • Breast cancer Maternal Aunt 45   • Cancer Brother         kidney         Current Outpatient Medications:   •  Acetaminophen (TYLENOL ARTHRITIS EXT RELIEF PO), Take 2 tablets  "by mouth 3 (Three) Times a Day., Disp: , Rfl:   •  alclometasone (ACLOVATE) 0.05 % cream, Apply to the ears and affected areas in small amounts at bedtime as needed for no longer than 3-4 days at a time., Disp: 60 g, Rfl: 2  •  aspirin 81 MG EC tablet, Take 81 mg by mouth Daily., Disp: , Rfl:   •  clobetasol (TEMOVATE) 0.05 % ointment, Apply  topically to the appropriate area as directed 2 (Two) Times a Day., Disp: 60 g, Rfl: 1  •  clobetasol (TEMOVATE) 0.05 % ointment, For the ear: Apply at bedtime x5-6 days then change to alclometasone. For the knee: Apply at bedtime x2-3 days as needed., Disp: 60 g, Rfl: 1  •  clobetasol propionate (CLOBEX) 0.05 % shampoo, Massage into scalp 15 min. before rinsing every night at bedtime x1 month, then decrease to 2 times per week as directed., Disp: 118 mL, Rfl: 2  •  cyclobenzaprine (FLEXERIL) 10 MG tablet, Take 1 tablet by mouth 3 (Three) Times a Day As Needed for Muscle Spasms., Disp: 30 tablet, Rfl: 3  •  docusate sodium (STOOL SOFTENER) 100 MG capsule, Take  by mouth., Disp: , Rfl:   •  folic acid (FOLVITE) 1 MG tablet, Take 1 tablet by mouth Daily as directed., Disp: 90 tablet, Rfl: 1  •  HYDROcodone-acetaminophen (NORCO) 5-325 MG per tablet, , Disp: , Rfl:   •  Methotrexate Sodium (methotrexate PF) 50 MG/2ML chemo syringe, Inject 0.8 mL under the skin into the appropriate area as directed 1 (One) Time Per Week. DO NOT RE-USE VIALS, Disp: 24 mL, Rfl: 1  •  omeprazole (priLOSEC) 20 MG capsule, One po qd, Disp: 30 capsule, Rfl: 11  •  pravastatin (PRAVACHOL) 10 MG tablet, Take 1 tablet by mouth Daily., Disp: 90 tablet, Rfl: 0  •  sodium-potassium-magnesium sulfates (Suprep Bowel Prep Kit) 17.5-3.13-1.6 GM/177ML solution oral solution, Take 1 bottle by mouth Take As Directed. Follow instructions that were mailed to your home. If you didn't receive these call [phone]., Disp: 354 mL, Rfl: 0  •  Syringe/Needle, Disp, (Easy Touch Safety Syringe) 25G X 5/8\" 1 ML misc, USE AS " "DIRECTED., Disp: 12 each, Rfl: 0  •  Tuberculin Syringe 25G X 5/8\" 1 ML misc, Use as directed, Disp: 4 each, Rfl: 2    Review of Systems   Constitutional: Negative for activity change, appetite change, chills, diaphoresis, fatigue, fever, unexpected weight gain and unexpected weight loss.   HENT: Positive for congestion, rhinorrhea and sinus pressure. Negative for dental problem, drooling, ear discharge, ear pain, facial swelling, hearing loss, mouth sores, nosebleeds, postnasal drip, sneezing, sore throat, swollen glands, tinnitus, trouble swallowing and voice change.    Respiratory: Positive for cough. Negative for apnea, choking, chest tightness, shortness of breath, wheezing and stridor.    Cardiovascular: Negative for chest pain, palpitations and leg swelling.   Gastrointestinal: Negative for abdominal distention, abdominal pain, anal bleeding, blood in stool, constipation, diarrhea, nausea, rectal pain, vomiting, GERD and indigestion.   Endocrine: Negative for cold intolerance, heat intolerance, polydipsia, polyphagia and polyuria.   Musculoskeletal: Positive for arthralgias and myalgias. Negative for back pain, gait problem, joint swelling, neck pain, neck stiffness and bursitis.   Skin: Negative for color change, dry skin, pallor, rash, skin lesions and bruise.       Objective   Vital Signs  Pulse 72   Ht 165.1 cm (65\")   Wt 71.7 kg (158 lb)   SpO2 97%   BMI 26.29 kg/m²     Physical Exam  Constitutional:       Appearance: Normal appearance.   HENT:      Head: Normocephalic.      Right Ear: Hearing, tympanic membrane, ear canal and external ear normal.      Left Ear: Hearing, tympanic membrane, ear canal and external ear normal.      Nose:      Right Turbinates: Enlarged and swollen. Not pale.      Left Turbinates: Enlarged and swollen. Not pale.      Mouth/Throat:      Mouth: Mucous membranes are moist.      Pharynx: Oropharynx is clear.   Eyes:      Conjunctiva/sclera: Conjunctivae normal.      Pupils: " Pupils are equal, round, and reactive to light.   Cardiovascular:      Rate and Rhythm: Normal rate and regular rhythm.      Pulses: Normal pulses.      Heart sounds: Normal heart sounds.   Pulmonary:      Effort: Pulmonary effort is normal.      Breath sounds: Normal breath sounds.   Abdominal:      General: Bowel sounds are normal.      Palpations: Abdomen is soft.   Skin:     General: Skin is warm and dry.      Capillary Refill: Capillary refill takes less than 2 seconds.   Neurological:      Mental Status: She is alert and oriented to person, place, and time.   Psychiatric:         Mood and Affect: Mood normal.         Behavior: Behavior normal.         Thought Content: Thought content normal.         Judgment: Judgment normal.          Result Review :     The following data was reviewed by: ROGERIO Ochoa on 11/19/2021:    POC Influenza + for Influenza A  Assessment and Plan    1. Flu-like symptom  - POCT Influenza A/B    2. Influenza A  - Discussed viral vs. Bacterial etiology. Influenza is viral. Patient advised to seek symptomatic relief with OTC cough and cold preparations as indicated, Tylenol/Ibuprofen for fever, headache, body aches. Encouraged adequate rest and fluid intake. Advised that flu can be contagious before symptoms begin and up to 5-7 after symptoms first noticed. Universal precautions recommended including appropriate handwashing and hygiene measures to prevent the spread of virus. Follow-up for new, worsening, or persistent symptoms.    Follow Up     Return for Next scheduled follow up.    Patient was given instructions and counseling regarding her condition or for health maintenance advice. Please see specific information pulled into the AVS if appropriate.    Part of this note may be an electronic transcription/translation of spoken language to printed text using the Dragon Dictation System.    Electronically signed by:   ROGERIO Ochoa  11/19/2021

## 2021-12-03 ENCOUNTER — TRANSCRIBE ORDERS (OUTPATIENT)
Dept: LAB | Facility: HOSPITAL | Age: 61
End: 2021-12-03

## 2021-12-03 ENCOUNTER — LAB (OUTPATIENT)
Dept: LAB | Facility: HOSPITAL | Age: 61
End: 2021-12-03

## 2021-12-03 DIAGNOSIS — L40.50 PSORIATIC ARTHROPATHY (HCC): ICD-10-CM

## 2021-12-03 DIAGNOSIS — Z15.89 DEFICIENCY OF DNA REPAIR: ICD-10-CM

## 2021-12-03 DIAGNOSIS — Z51.81 ENCOUNTER FOR THERAPEUTIC DRUG MONITORING: ICD-10-CM

## 2021-12-03 DIAGNOSIS — Z51.81 ENCOUNTER FOR THERAPEUTIC DRUG MONITORING: Primary | ICD-10-CM

## 2021-12-03 LAB
ALBUMIN SERPL-MCNC: 4.4 G/DL (ref 3.5–5.2)
ALBUMIN/GLOB SERPL: 1.6 G/DL
ALP SERPL-CCNC: 84 U/L (ref 39–117)
ALT SERPL W P-5'-P-CCNC: 17 U/L (ref 1–33)
ANION GAP SERPL CALCULATED.3IONS-SCNC: 7.2 MMOL/L (ref 5–15)
AST SERPL-CCNC: 24 U/L (ref 1–32)
BILIRUB SERPL-MCNC: 0.4 MG/DL (ref 0–1.2)
BUN SERPL-MCNC: 10 MG/DL (ref 6–20)
BUN/CREAT SERPL: 17.2 (ref 7–25)
CALCIUM SPEC-SCNC: 9.4 MG/DL (ref 8.6–10.5)
CHLORIDE SERPL-SCNC: 103 MMOL/L (ref 98–107)
CO2 SERPL-SCNC: 26.8 MMOL/L (ref 22–29)
CREAT SERPL-MCNC: 0.58 MG/DL (ref 0.57–1)
GFR SERPL CREATININE-BSD FRML MDRD: 106 ML/MIN/1.73
GLOBULIN UR ELPH-MCNC: 2.7 GM/DL
GLUCOSE SERPL-MCNC: 85 MG/DL (ref 65–99)
POTASSIUM SERPL-SCNC: 4.1 MMOL/L (ref 3.5–5.2)
PROT SERPL-MCNC: 7.1 G/DL (ref 6–8.5)
SODIUM SERPL-SCNC: 137 MMOL/L (ref 136–145)

## 2021-12-03 PROCEDURE — 36415 COLL VENOUS BLD VENIPUNCTURE: CPT

## 2021-12-03 PROCEDURE — 80053 COMPREHEN METABOLIC PANEL: CPT

## 2022-02-18 DIAGNOSIS — E78.5 DYSLIPIDEMIA: ICD-10-CM

## 2022-02-21 RX ORDER — PRAVASTATIN SODIUM 10 MG
TABLET ORAL
Qty: 90 TABLET | Refills: 0 | Status: SHIPPED | OUTPATIENT
Start: 2022-02-21 | End: 2022-04-11 | Stop reason: SDUPTHER

## 2022-03-04 ENCOUNTER — LAB (OUTPATIENT)
Dept: LAB | Facility: HOSPITAL | Age: 62
End: 2022-03-04

## 2022-03-04 ENCOUNTER — TRANSCRIBE ORDERS (OUTPATIENT)
Dept: LAB | Facility: HOSPITAL | Age: 62
End: 2022-03-04

## 2022-03-04 DIAGNOSIS — L40.50 PSORIATIC ARTHROPATHY: ICD-10-CM

## 2022-03-04 DIAGNOSIS — L40.50 PSORIATIC ARTHROPATHY: Primary | ICD-10-CM

## 2022-03-04 LAB
25(OH)D3 SERPL-MCNC: 71.9 NG/ML
ALBUMIN SERPL-MCNC: 4.6 G/DL (ref 3.5–5.2)
ALBUMIN/GLOB SERPL: 1.6 G/DL
ALP SERPL-CCNC: 82 U/L (ref 39–117)
ALT SERPL W P-5'-P-CCNC: 42 U/L (ref 1–33)
ANION GAP SERPL CALCULATED.3IONS-SCNC: 10 MMOL/L (ref 5–15)
AST SERPL-CCNC: 25 U/L (ref 1–32)
BASOPHILS # BLD AUTO: 0.05 10*3/MM3 (ref 0–0.2)
BASOPHILS NFR BLD AUTO: 0.7 % (ref 0–1.5)
BILIRUB SERPL-MCNC: 0.3 MG/DL (ref 0–1.2)
BUN SERPL-MCNC: 11 MG/DL (ref 6–20)
BUN/CREAT SERPL: 17.7 (ref 7–25)
CALCIUM SPEC-SCNC: 9.3 MG/DL (ref 8.6–10.5)
CHLORIDE SERPL-SCNC: 104 MMOL/L (ref 98–107)
CO2 SERPL-SCNC: 26 MMOL/L (ref 22–29)
CREAT SERPL-MCNC: 0.62 MG/DL (ref 0.57–1)
CRP SERPL-MCNC: <0.3 MG/DL (ref 0–0.5)
DEPRECATED RDW RBC AUTO: 42.6 FL (ref 37–54)
EGFRCR SERPLBLD CKD-EPI 2021: 102.7 ML/MIN/1.73
EOSINOPHIL # BLD AUTO: 0.3 10*3/MM3 (ref 0–0.4)
EOSINOPHIL NFR BLD AUTO: 4.2 % (ref 0.3–6.2)
ERYTHROCYTE [DISTWIDTH] IN BLOOD BY AUTOMATED COUNT: 12.3 % (ref 12.3–15.4)
ERYTHROCYTE [SEDIMENTATION RATE] IN BLOOD: 11 MM/HR (ref 0–30)
GLOBULIN UR ELPH-MCNC: 2.8 GM/DL
GLUCOSE SERPL-MCNC: 92 MG/DL (ref 65–99)
HCT VFR BLD AUTO: 43.2 % (ref 34–46.6)
HGB BLD-MCNC: 14.6 G/DL (ref 12–15.9)
IMM GRANULOCYTES # BLD AUTO: 0.03 10*3/MM3 (ref 0–0.05)
IMM GRANULOCYTES NFR BLD AUTO: 0.4 % (ref 0–0.5)
LYMPHOCYTES # BLD AUTO: 2.57 10*3/MM3 (ref 0.7–3.1)
LYMPHOCYTES NFR BLD AUTO: 35.8 % (ref 19.6–45.3)
MCH RBC QN AUTO: 32 PG (ref 26.6–33)
MCHC RBC AUTO-ENTMCNC: 33.8 G/DL (ref 31.5–35.7)
MCV RBC AUTO: 94.7 FL (ref 79–97)
MONOCYTES # BLD AUTO: 0.75 10*3/MM3 (ref 0.1–0.9)
MONOCYTES NFR BLD AUTO: 10.5 % (ref 5–12)
NEUTROPHILS NFR BLD AUTO: 3.47 10*3/MM3 (ref 1.7–7)
NEUTROPHILS NFR BLD AUTO: 48.4 % (ref 42.7–76)
NRBC BLD AUTO-RTO: 0 /100 WBC (ref 0–0.2)
PLATELET # BLD AUTO: 220 10*3/MM3 (ref 140–450)
PMV BLD AUTO: 9 FL (ref 6–12)
POTASSIUM SERPL-SCNC: 4 MMOL/L (ref 3.5–5.2)
PROT SERPL-MCNC: 7.4 G/DL (ref 6–8.5)
RBC # BLD AUTO: 4.56 10*6/MM3 (ref 3.77–5.28)
SODIUM SERPL-SCNC: 140 MMOL/L (ref 136–145)
WBC NRBC COR # BLD: 7.17 10*3/MM3 (ref 3.4–10.8)

## 2022-03-04 PROCEDURE — 86140 C-REACTIVE PROTEIN: CPT

## 2022-03-04 PROCEDURE — 80053 COMPREHEN METABOLIC PANEL: CPT

## 2022-03-04 PROCEDURE — 82306 VITAMIN D 25 HYDROXY: CPT

## 2022-03-04 PROCEDURE — 36415 COLL VENOUS BLD VENIPUNCTURE: CPT

## 2022-03-04 PROCEDURE — 85025 COMPLETE CBC W/AUTO DIFF WBC: CPT

## 2022-03-04 PROCEDURE — 85652 RBC SED RATE AUTOMATED: CPT

## 2022-04-11 DIAGNOSIS — E78.5 DYSLIPIDEMIA: ICD-10-CM

## 2022-04-11 RX ORDER — PRAVASTATIN SODIUM 10 MG
10 TABLET ORAL DAILY
Qty: 90 TABLET | Refills: 0 | Status: SHIPPED | OUTPATIENT
Start: 2022-04-11

## 2022-05-16 ENCOUNTER — TRANSCRIBE ORDERS (OUTPATIENT)
Dept: LAB | Facility: HOSPITAL | Age: 62
End: 2022-05-16

## 2022-05-16 ENCOUNTER — LAB (OUTPATIENT)
Dept: LAB | Facility: HOSPITAL | Age: 62
End: 2022-05-16

## 2022-05-16 DIAGNOSIS — L40.50 PSORIATIC ARTHROPATHY: ICD-10-CM

## 2022-05-16 DIAGNOSIS — L40.50 PSORIATIC ARTHROPATHY: Primary | ICD-10-CM

## 2022-05-16 LAB
BASOPHILS # BLD AUTO: 0.04 10*3/MM3 (ref 0–0.2)
BASOPHILS NFR BLD AUTO: 0.5 % (ref 0–1.5)
DEPRECATED RDW RBC AUTO: 41.1 FL (ref 37–54)
EOSINOPHIL # BLD AUTO: 0.25 10*3/MM3 (ref 0–0.4)
EOSINOPHIL NFR BLD AUTO: 3.3 % (ref 0.3–6.2)
ERYTHROCYTE [DISTWIDTH] IN BLOOD BY AUTOMATED COUNT: 12.4 % (ref 12.3–15.4)
ERYTHROCYTE [SEDIMENTATION RATE] IN BLOOD: 9 MM/HR (ref 0–30)
HCT VFR BLD AUTO: 39.2 % (ref 34–46.6)
HGB BLD-MCNC: 13.8 G/DL (ref 12–15.9)
IMM GRANULOCYTES # BLD AUTO: 0.02 10*3/MM3 (ref 0–0.05)
IMM GRANULOCYTES NFR BLD AUTO: 0.3 % (ref 0–0.5)
LYMPHOCYTES # BLD AUTO: 2.79 10*3/MM3 (ref 0.7–3.1)
LYMPHOCYTES NFR BLD AUTO: 37.3 % (ref 19.6–45.3)
MCH RBC QN AUTO: 32.4 PG (ref 26.6–33)
MCHC RBC AUTO-ENTMCNC: 35.2 G/DL (ref 31.5–35.7)
MCV RBC AUTO: 92 FL (ref 79–97)
MONOCYTES # BLD AUTO: 0.69 10*3/MM3 (ref 0.1–0.9)
MONOCYTES NFR BLD AUTO: 9.2 % (ref 5–12)
NEUTROPHILS NFR BLD AUTO: 3.68 10*3/MM3 (ref 1.7–7)
NEUTROPHILS NFR BLD AUTO: 49.4 % (ref 42.7–76)
NRBC BLD AUTO-RTO: 0 /100 WBC (ref 0–0.2)
PLATELET # BLD AUTO: 198 10*3/MM3 (ref 140–450)
PMV BLD AUTO: 9.2 FL (ref 6–12)
RBC # BLD AUTO: 4.26 10*6/MM3 (ref 3.77–5.28)
WBC NRBC COR # BLD: 7.47 10*3/MM3 (ref 3.4–10.8)

## 2022-05-16 PROCEDURE — 82550 ASSAY OF CK (CPK): CPT

## 2022-05-16 PROCEDURE — 86140 C-REACTIVE PROTEIN: CPT

## 2022-05-16 PROCEDURE — 86038 ANTINUCLEAR ANTIBODIES: CPT

## 2022-05-16 PROCEDURE — 85652 RBC SED RATE AUTOMATED: CPT

## 2022-05-16 PROCEDURE — 86480 TB TEST CELL IMMUN MEASURE: CPT

## 2022-05-16 PROCEDURE — 80053 COMPREHEN METABOLIC PANEL: CPT

## 2022-05-16 PROCEDURE — 36415 COLL VENOUS BLD VENIPUNCTURE: CPT

## 2022-05-16 PROCEDURE — 85025 COMPLETE CBC W/AUTO DIFF WBC: CPT

## 2022-05-17 LAB
ALBUMIN SERPL-MCNC: 4.4 G/DL (ref 3.5–5.2)
ALBUMIN/GLOB SERPL: 1.8 G/DL
ALP SERPL-CCNC: 67 U/L (ref 39–117)
ALT SERPL W P-5'-P-CCNC: 21 U/L (ref 1–33)
ANA SER QL: NEGATIVE
ANION GAP SERPL CALCULATED.3IONS-SCNC: 12.6 MMOL/L (ref 5–15)
AST SERPL-CCNC: 25 U/L (ref 1–32)
BILIRUB SERPL-MCNC: 0.5 MG/DL (ref 0–1.2)
BUN SERPL-MCNC: 10 MG/DL (ref 8–23)
BUN/CREAT SERPL: 18.9 (ref 7–25)
CALCIUM SPEC-SCNC: 9.3 MG/DL (ref 8.6–10.5)
CHLORIDE SERPL-SCNC: 105 MMOL/L (ref 98–107)
CK SERPL-CCNC: 224 U/L (ref 20–180)
CO2 SERPL-SCNC: 22.4 MMOL/L (ref 22–29)
CREAT SERPL-MCNC: 0.53 MG/DL (ref 0.57–1)
CRP SERPL-MCNC: <0.3 MG/DL (ref 0–0.5)
EGFRCR SERPLBLD CKD-EPI 2021: 106 ML/MIN/1.73
GLOBULIN UR ELPH-MCNC: 2.5 GM/DL
GLUCOSE SERPL-MCNC: 82 MG/DL (ref 65–99)
POTASSIUM SERPL-SCNC: 3.8 MMOL/L (ref 3.5–5.2)
PROT SERPL-MCNC: 6.9 G/DL (ref 6–8.5)
SODIUM SERPL-SCNC: 140 MMOL/L (ref 136–145)

## 2022-05-18 ENCOUNTER — DOCUMENTATION (OUTPATIENT)
Dept: PHARMACY | Facility: TELEHEALTH | Age: 62
End: 2022-05-18

## 2022-05-18 ENCOUNTER — SPECIALTY PHARMACY (OUTPATIENT)
Dept: PHARMACY | Facility: TELEHEALTH | Age: 62
End: 2022-05-18

## 2022-05-18 LAB
GAMMA INTERFERON BACKGROUND BLD IA-ACNC: 0.01 IU/ML
M TB IFN-G BLD-IMP: NEGATIVE
M TB IFN-G CD4+ BCKGRND COR BLD-ACNC: 0.04 IU/ML
M TB IFN-G CD4+CD8+ BCKGRND COR BLD-ACNC: 0.03 IU/ML
MITOGEN IGNF BLD-ACNC: >10 IU/ML
QUANTIFERON INCUBATION: NORMAL
SERVICE CMNT-IMP: NORMAL

## 2022-05-18 NOTE — PROGRESS NOTES
Specialty Pharmacy Patient Management Program  Initial Assessment     Sherin Azul is a 62 y.o. female with psoriasis and psoriatic arthritis and enrolled in the Patient Management program offered by [facility].  An initial outreach was conducted, including assessment of therapy appropriateness and specialty medication education for Humira Pen 40mg/0.4ml. The patient was introduced to services offered by [facility], including: regular assessments, refill coordination, curbside pick-up or mail order delivery options, prior authorization maintenance, and financial assistance programs as applicable. The patient was also provided with contact information for the pharmacy team.     Insurance Coverage & Financial Support  [employer] plus Humira copay assistance     Relevant Past Medical History and Comorbidities  Relevant medical history and concomitant health conditions were discussed with the patient. The patient's chart has been reviewed for relevant past medical history and comorbid health conditions and updated as necessary.   Past Medical History:   Diagnosis Date   • Acrochordon    • Acute maxillary sinusitis    • Acute otitis media    • Breast cancer (HCC) 2005    left   • Dermatitis    • Drug therapy 2005   • Eczema    • H/O alcohol abuse    • Herpes    • History of breast cancer    • History of cervical dysplasia    • History of MRSA infection    • Hx of radiation therapy 2005   • Joint pain, hip    • Joint pain, knee    • Osteoarthritis    • Psoriasis    • Scabies    • Tendonitis    • Varicose veins of legs    • Viral syndrome    • Weight gain      Social History     Socioeconomic History   • Marital status:    Tobacco Use   • Smoking status: Former Smoker   • Smokeless tobacco: Never Used   Substance and Sexual Activity   • Alcohol use: No   • Drug use: No   • Sexual activity: Defer       Allergies  Known allergies and reactions were discussed  with the patient. The patient's chart has been reviewed for  allergy information and updated as necessary.   Meloxicam    Current Medication List  This medication list has been reviewed with the patient and evaluated for any interactions or necessary modifications/recommendations, and updated to include all prescription medications, OTC medications, and supplements the patient is currently taking.  This list reflects what is contained in the patient's profile, which has also been marked as reviewed to communicate to other providers it is the most up to date version of the patient's current medication therapy.     Current Outpatient Medications:   •  Acetaminophen (TYLENOL ARTHRITIS EXT RELIEF PO), Take 2 tablets by mouth 3 (Three) Times a Day., Disp: , Rfl:   •  Adalimumab (Humira Pen) 40 MG/0.4ML Pen-injector Kit, Inject 40 mg (one pen)  under the skin into the appropriate area as directed every other week., Disp: 2 each, Rfl: 3  •  alclometasone (ACLOVATE) 0.05 % cream, Apply to the ears and affected areas in small amounts at bedtime as needed for no longer than 3-4 days at a time., Disp: 60 g, Rfl: 2  •  aspirin 81 MG EC tablet, Take 81 mg by mouth Daily., Disp: , Rfl:   •  clobetasol (TEMOVATE) 0.05 % external solution, Apply and gently massage onto affected area(s) as directed once Daily., Disp: 50 mL, Rfl: 1  •  clobetasol (TEMOVATE) 0.05 % ointment, Apply  topically to the appropriate area as directed 2 (Two) Times a Day., Disp: 60 g, Rfl: 1  •  clobetasol (TEMOVATE) 0.05 % ointment, For the ear: Apply at bedtime x5-6 days then change to alclometasone. For the knee: Apply at bedtime x2-3 days as needed., Disp: 60 g, Rfl: 1  •  clobetasol propionate (CLOBEX) 0.05 % shampoo, Massage into scalp 15 min. before rinsing every night at bedtime x1 month, then decrease to 2 times per week as directed., Disp: 118 mL, Rfl: 2  •  clobetasol propionate (CLOBEX) 0.05 % shampoo, MASSAGE ONTO WET SCALP. LEAVE LATHER ON FOR 3  MINUTES, THEN RINSE. REPEAT THE APPLICATION ONCE. USE TWICE PER WEEK., Disp: 118 mL, Rfl: 1  •  cyclobenzaprine (FLEXERIL) 10 MG tablet, Take 1 tablet by mouth 3 (Three) Times a Day As Needed for Muscle Spasms., Disp: 30 tablet, Rfl: 3  •  docusate sodium (STOOL SOFTENER) 100 MG capsule, Take  by mouth., Disp: , Rfl:   •  folic acid (FOLVITE) 1 MG tablet, Take 1 tablet by mouth Daily as directed., Disp: 90 tablet, Rfl: 1  •  folic acid (FOLVITE) 1 MG tablet, Take 1 tablet by mouth Daily., Disp: 30 tablet, Rfl: 2  •  folic acid (FOLVITE) 1 MG tablet, Take 1 tablet by mouth Daily as directed., Disp: 30 tablet, Rfl: 1  •  folic acid (FOLVITE) 1 MG tablet, Take 1 tablet by mouth Daily as directed., Disp: 30 tablet, Rfl: 2  •  HYDROcodone-acetaminophen (NORCO) 5-325 MG per tablet, , Disp: , Rfl:   •  leflunomide (ARAVA) 10 MG tablet, Take 1 tablet by mouth daily., Disp: 30 tablet, Rfl: 1  •  leflunomide (ARAVA) 20 MG tablet, Take 1 tablet by mouth Daily., Disp: 30 tablet, Rfl: 2  •  leflunomide (ARAVA) 20 MG tablet, Take 1 tablet by mouth Daily., Disp: 30 tablet, Rfl: 1  •  Methotrexate Sodium (methotrexate PF) 50 MG/2ML chemo syringe, Inject 0.8 mL under the skin into the appropriate area as directed 1 (One) Time Per Week. DO NOT RE-USE VIALS, Disp: 24 mL, Rfl: 1  •  omeprazole (priLOSEC) 20 MG capsule, One po qd, Disp: 30 capsule, Rfl: 11  •  pravastatin (PRAVACHOL) 10 MG tablet, Take 1 tablet by mouth Daily., Disp: 90 tablet, Rfl: 0  •  predniSONE (DELTASONE) 10 MG tablet, Take 1 tablet by mouth Daily As Needed., Disp: 30 tablet, Rfl: 0  •  predniSONE (DELTASONE) 10 MG tablet, Take 1 tablet by mouth Daily As Needed., Disp: 30 tablet, Rfl: 0  •  sodium-potassium-magnesium sulfates (Suprep Bowel Prep Kit) 17.5-3.13-1.6 GM/177ML solution oral solution, Take 1 bottle by mouth Take As Directed. Follow instructions that were mailed to your home. If you didn't receive these call [phone]., Disp: 354 mL, Rfl: 0  •   "Syringe/Needle, Disp, (Easy Touch Safety Syringe) 25G X 5/8\" 1 ML misc, USE AS DIRECTED., Disp: 12 each, Rfl: 0  •  triamcinolone (KENALOG) 0.1 % cream, Apply 1 application topically to the appropriate area as directed 2 (Two) Times a Day., Disp: 80 g, Rfl: 1  •  Tuberculin Syringe 25G X 5/8\" 1 ML misc, Use as directed, Disp: 4 each, Rfl: 2    Drug Interactions  none     Relevant Laboratory Values  No results for input(s): CMP, BMP, CBC in the last 72 hours.    Initial Education Provided for Specialty Medication  The patient has been provided with the following education and any applicable administration techniques (i.e. self-injection) have been demonstrated for the therapies indicated. All questions and concerns have been addressed prior to the patient receiving the medication, and the patient has verbalized understanding of the education and any materials provided.  Additional patient education shall be provided and documented upon request by the patient, provider or payer.            Humira (adalimumab)           Medication Expectations   Why am I taking this medication? You are taking this medication for Crohn's disease,ulcerative colitis, rheumatoid or psoriatic arthritis.   What should I expect while on this medication? You should expect a decrease in the frequency and severity of symptoms.   How does the medication work? Adalimumab binds to TNF-alpha therefore interfering with binding to a TNFa receptor site.   How long will I be on this medication for? The amount of time you will be on this medication will be determined by your doctor and your response to the medication.    How do I take this medication? Take as directed on your prescription label.  This medication is a subcutaneous injection given in the fatty part of the skin on the top of the thigh or stomach area. May leave at room temperature for 15-30 minutes prior to injection.   What are some possible side effects? Injection site reactions and " hypersensitivity reactions, headache, signs of a common cold, stomach pain, upset stomach, or back pain.   What happens if I miss a dose? If you miss a dose, take it as soon as you remember. If it is close to the time for your next dose, skip the missed dose and go back to your normal time.                    Medication Safety   What are things I should warn my doctor immediately about? Allergic reaction such has hives or trouble breathing. If you develop symptoms such as a cough that does not go away, weight loss, changes in how often you urinate, numbness or tingling in extremities, rash on cheeks or other body parts, unusual bleeding or bruising.     What are things that I should be cautious of? Injection site reaction, back pain, and headache. You may have more chance of getting an infection.  Wash your hands often and stay away from people with infections, colds, or flu.   What are some medications that can interact with this one? Immunosuppressants and vaccines.            Medication Storage/Handling   How should I handle this medication? Keep this medication our of reach of pets/children in original container.  Store in the original container to protect from light. Do not inject where the skin is tender, bruised, red, hard, or affected by psoriasis.  Rotate injection sites.   How does this medication need to be stored? Store in refrigerator and keep dry. If needed, you may store at room temperature for up to 14 days.   How should I dispose of this medication? You can dispose of the empty syringe in a sharps container, and if this is not available you may use an empty hard plastic container such as a milk jug or tide container.            Resources/Support   How can I remind myself to take this medication? You can download a reminder ascencion on your phone or use a calandar  to help with your injection.   Is financial support available?  Yes, [employer] can provide co-pay cards if you have commercial insurance or  patient assistance if you have Medicare or no insurance.    Which vaccines are recommended for me? Talk to your doctor about these vaccines: Flu, Coronavirus (COVID-19), Pneumococcal (pneumonia), Tdap, Hepatitis B, Zoster (shingles).                Adherence and Self-Administration  • Barriers to Patient Adherence and/or Self-Administration: Patient was given injection training at the office   • Methods for Supporting Patient Adherence and/or Self-Administration: none required     Goals of Therapy  • Patient Goals of Therapy: reduction of psoriasis flares and arthritic pain   • Clinical Goals or Therapeutic Targets, If Applicable: maintain adherence of greater than 80%     Reassessment Plan & Follow-Up  1. Medication Therapy Changes: Patient is a new start on Humira.  Patient received injection training at the office.  They also reviewed side effects, pros and cons of medication.  2. Additional Plans, Therapy Recommendations, or Therapy Problems to Be Addressed:  none  3. Pharmacist to perform regular reassessments no more than (6) months from the previous assessment.  4. Welcome information and patient satisfaction survey to be sent by retail team with patient's initial fill.  5. Care Coordinator to set up future refill outreaches, coordinate prescription delivery, and escalate clinical questions to pharmacist.     Attestation  I attest that the initiated specialty medication(s) are appropriate for the patient based on my assessment.  If the prescribed therapy is at any point deemed not appropriate based on the current or future assessments, a consultation will be initiated with the patient's specialty care provider to determine the best course of action. The revised plan of therapy will be documented along with any additional patient education provided.     Electronically signed by Alejandro Gant RPH, 05/18/22, 10:23 AM EDT.

## 2022-05-27 ENCOUNTER — TELEPHONE (OUTPATIENT)
Dept: INTERNAL MEDICINE | Facility: CLINIC | Age: 62
End: 2022-05-27

## 2022-05-27 NOTE — TELEPHONE ENCOUNTER
Caller: Sherin Azul    Relationship: Self    Best call back number: [phone]    What orders are you requesting (i.e. lab or imaging): LABS    In what timeframe would the patient need to come in: ASAP     Where will you receive your lab/imaging services: [facility]    Additional notes: PATIENT SCHEDULED FOR 6/2/22 AND THINKS SHE MAY HAVE DIABETES DUE TO HER SYMPTOMS OF THIRST, WEIGHT LOSS

## 2022-05-27 NOTE — TELEPHONE ENCOUNTER
I put in order for routine fasting labs for her physical. This will show her blood sugar and we can do a 3 month average (hemoglobin a1c) of blood sugars with a fingerstick at her appointment.

## 2022-05-31 ENCOUNTER — LAB (OUTPATIENT)
Dept: LAB | Facility: HOSPITAL | Age: 62
End: 2022-05-31

## 2022-05-31 DIAGNOSIS — Z00.00 HEALTH CARE MAINTENANCE: ICD-10-CM

## 2022-05-31 LAB
BASOPHILS # BLD AUTO: 0.04 10*3/MM3 (ref 0–0.2)
BASOPHILS NFR BLD AUTO: 0.4 % (ref 0–1.5)
DEPRECATED RDW RBC AUTO: 43.6 FL (ref 37–54)
EOSINOPHIL # BLD AUTO: 0.25 10*3/MM3 (ref 0–0.4)
EOSINOPHIL NFR BLD AUTO: 2.7 % (ref 0.3–6.2)
ERYTHROCYTE [DISTWIDTH] IN BLOOD BY AUTOMATED COUNT: 12.7 % (ref 12.3–15.4)
HCT VFR BLD AUTO: 41.7 % (ref 34–46.6)
HGB BLD-MCNC: 14.1 G/DL (ref 12–15.9)
IMM GRANULOCYTES # BLD AUTO: 0.04 10*3/MM3 (ref 0–0.05)
IMM GRANULOCYTES NFR BLD AUTO: 0.4 % (ref 0–0.5)
LYMPHOCYTES # BLD AUTO: 3.05 10*3/MM3 (ref 0.7–3.1)
LYMPHOCYTES NFR BLD AUTO: 32.9 % (ref 19.6–45.3)
MCH RBC QN AUTO: 31.6 PG (ref 26.6–33)
MCHC RBC AUTO-ENTMCNC: 33.8 G/DL (ref 31.5–35.7)
MCV RBC AUTO: 93.5 FL (ref 79–97)
MONOCYTES # BLD AUTO: 0.87 10*3/MM3 (ref 0.1–0.9)
MONOCYTES NFR BLD AUTO: 9.4 % (ref 5–12)
NEUTROPHILS NFR BLD AUTO: 5.01 10*3/MM3 (ref 1.7–7)
NEUTROPHILS NFR BLD AUTO: 54.2 % (ref 42.7–76)
NRBC BLD AUTO-RTO: 0 /100 WBC (ref 0–0.2)
PLATELET # BLD AUTO: 235 10*3/MM3 (ref 140–450)
PMV BLD AUTO: 9.3 FL (ref 6–12)
RBC # BLD AUTO: 4.46 10*6/MM3 (ref 3.77–5.28)
WBC NRBC COR # BLD: 9.26 10*3/MM3 (ref 3.4–10.8)

## 2022-05-31 PROCEDURE — 80050 GENERAL HEALTH PANEL: CPT

## 2022-05-31 PROCEDURE — 80061 LIPID PANEL: CPT

## 2022-06-01 LAB
ALBUMIN SERPL-MCNC: 4.1 G/DL (ref 3.5–5.2)
ALBUMIN/GLOB SERPL: 1.3 G/DL
ALP SERPL-CCNC: 63 U/L (ref 39–117)
ALT SERPL W P-5'-P-CCNC: 18 U/L (ref 1–33)
ANION GAP SERPL CALCULATED.3IONS-SCNC: 12 MMOL/L (ref 5–15)
AST SERPL-CCNC: 21 U/L (ref 1–32)
BILIRUB SERPL-MCNC: 0.4 MG/DL (ref 0–1.2)
BUN SERPL-MCNC: 12 MG/DL (ref 8–23)
BUN/CREAT SERPL: 20.7 (ref 7–25)
CALCIUM SPEC-SCNC: 9.6 MG/DL (ref 8.6–10.5)
CHLORIDE SERPL-SCNC: 103 MMOL/L (ref 98–107)
CHOLEST SERPL-MCNC: 181 MG/DL (ref 0–200)
CO2 SERPL-SCNC: 21 MMOL/L (ref 22–29)
CREAT SERPL-MCNC: 0.58 MG/DL (ref 0.57–1)
EGFRCR SERPLBLD CKD-EPI 2021: 103.7 ML/MIN/1.73
GLOBULIN UR ELPH-MCNC: 3.1 GM/DL
GLUCOSE SERPL-MCNC: 81 MG/DL (ref 65–99)
HDLC SERPL-MCNC: 48 MG/DL (ref 40–60)
LDLC SERPL CALC-MCNC: 101 MG/DL (ref 0–100)
LDLC/HDLC SERPL: 1.99 {RATIO}
POTASSIUM SERPL-SCNC: 3.8 MMOL/L (ref 3.5–5.2)
PROT SERPL-MCNC: 7.2 G/DL (ref 6–8.5)
SODIUM SERPL-SCNC: 136 MMOL/L (ref 136–145)
TRIGL SERPL-MCNC: 188 MG/DL (ref 0–150)
TSH SERPL DL<=0.05 MIU/L-ACNC: 1.28 UIU/ML (ref 0.27–4.2)
VLDLC SERPL-MCNC: 32 MG/DL (ref 5–40)

## 2022-06-02 ENCOUNTER — OFFICE VISIT (OUTPATIENT)
Dept: INTERNAL MEDICINE | Facility: CLINIC | Age: 62
End: 2022-06-02

## 2022-06-02 VITALS
HEIGHT: 65 IN | TEMPERATURE: 97.3 F | WEIGHT: 148 LBS | DIASTOLIC BLOOD PRESSURE: 72 MMHG | BODY MASS INDEX: 24.66 KG/M2 | HEART RATE: 71 BPM | SYSTOLIC BLOOD PRESSURE: 116 MMHG | OXYGEN SATURATION: 95 %

## 2022-06-02 DIAGNOSIS — R20.2 TINGLING OF BOTH FEET: ICD-10-CM

## 2022-06-02 DIAGNOSIS — F17.200 CURRENT EVERY DAY SMOKER: ICD-10-CM

## 2022-06-02 DIAGNOSIS — Z00.00 HEALTH CARE MAINTENANCE: Primary | ICD-10-CM

## 2022-06-02 LAB
EXPIRATION DATE: NORMAL
HBA1C MFR BLD: 5.2 %
Lab: NORMAL

## 2022-06-02 PROCEDURE — 99213 OFFICE O/P EST LOW 20 MIN: CPT | Performed by: PHYSICIAN ASSISTANT

## 2022-06-02 PROCEDURE — 83036 HEMOGLOBIN GLYCOSYLATED A1C: CPT | Performed by: PHYSICIAN ASSISTANT

## 2022-06-02 RX ORDER — VARENICLINE TARTRATE 0.5 MG/1
TABLET, FILM COATED ORAL
COMMUNITY
End: 2022-06-02

## 2022-06-02 RX ORDER — VARENICLINE TARTRATE 1 MG/1
1 TABLET, FILM COATED ORAL 2 TIMES DAILY
Qty: 56 TABLET | Refills: 4 | Status: SHIPPED | OUTPATIENT
Start: 2022-06-30 | End: 2022-11-17

## 2022-06-02 RX ORDER — ALCLOMETASONE DIPROPIONATE 0.5 MG/G
CREAM TOPICAL
Qty: 60 G | Refills: 2
Start: 2022-06-02

## 2022-06-02 NOTE — PROGRESS NOTES
Chief Complaint   Patient presents with   • Weight Loss   • Med Refill   • Leg Pain     Both legs       Subjective     Sherin Azul is a 62 y.o. female.        History of Present Illness     Pt has had some tingling, needles and pins in the bottoms of her feet and legs. It will come and go, unrelated to activity level. Not any worse when she goes to sleep.    Has unusual work schedule 6 pm- 2:30 am.    She has lost about 10 lbs, unintentionally, in the last year. She does not have much of an appetite but wonders if this is because she works in . Eats around 4 pm, snack at 9 pm and snack before she goes to bed at 3 or so in the morning. Mostly carbohydrates. Drinking protein shakes since 2 weeks ago.       Current Outpatient Medications:   •  Adalimumab (Humira Pen) 40 MG/0.4ML Pen-injector Kit, Inject 40 mg (one pen)  under the skin into the appropriate area as directed every other week., Disp: 2 each, Rfl: 3  •  alclometasone (ACLOVATE) 0.05 % cream, Apply to the ears and affected areas in small amounts at bedtime as needed for no longer than 3-4 days at a time., Disp: 60 g, Rfl: 2  •  clobetasol (TEMOVATE) 0.05 % external solution, Apply and gently massage onto affected area(s) as directed once Daily., Disp: 50 mL, Rfl: 1  •  pravastatin (PRAVACHOL) 10 MG tablet, Take 1 tablet by mouth Daily., Disp: 90 tablet, Rfl: 0  •  triamcinolone (KENALOG) 0.1 % cream, Apply 1 application topically to the appropriate area as directed 2 (Two) Times a Day., Disp: 80 g, Rfl: 1  •  HYDROcodone-acetaminophen (NORCO) 5-325 MG per tablet, , Disp: , Rfl:   •  varenicline (CHANTIX JOAN) 0.5 MG X 11 & 1 MG X 42 tablet, Take 0.5 mg by mouth daily for 3 days, then 0.5 mg by mouth  2 times a day  for 4 days, then 1 mg by mouth two times a day, Disp: 53 tablet, Rfl: 0  •  [START ON 6/30/2022] varenicline (CHANTIX) 1 MG tablet, Start after completing starter pack then....Take 1 tablet by mouth 2 (Two) Times a Day,  "Disp: 56 tablet, Rfl: 4     PMFSH  The following portions of the patient's history were reviewed and updated as appropriate: allergies, current medications, past family history, past medical history, past social history, past surgical history and problem list.    Review of Systems   Constitutional: Positive for unexpected weight change. Negative for activity change, appetite change and fatigue.   HENT: Negative for congestion and rhinorrhea.    Respiratory: Negative for chest tightness and shortness of breath.    Cardiovascular: Negative for chest pain and palpitations.   Gastrointestinal: Negative for abdominal pain.   Genitourinary: Negative for dysuria.   Musculoskeletal: Negative for arthralgias and myalgias.   Neurological: Negative for dizziness, weakness, light-headedness and headaches.   Psychiatric/Behavioral: Negative for dysphoric mood. The patient is nervous/anxious.        Objective   /72   Pulse 71   Temp 97.3 °F (36.3 °C)   Ht 165.1 cm (65\")   Wt 67.1 kg (148 lb)   SpO2 95%   Breastfeeding No   BMI 24.63 kg/m²     Physical Exam  Vitals and nursing note reviewed.   Constitutional:       Appearance: She is well-developed.   HENT:      Head: Normocephalic and atraumatic.      Right Ear: External ear normal.      Left Ear: External ear normal.   Eyes:      Conjunctiva/sclera: Conjunctivae normal.   Cardiovascular:      Rate and Rhythm: Normal rate and regular rhythm.   Pulmonary:      Effort: Pulmonary effort is normal.      Breath sounds: Normal breath sounds.   Musculoskeletal:         General: Normal range of motion.      Cervical back: Normal range of motion.   Skin:     General: Skin is warm and dry.   Psychiatric:         Behavior: Behavior normal.         Results for orders placed or performed in visit on 06/02/22   POC Glycosylated Hemoglobin (Hb A1C)    Specimen: Blood   Result Value Ref Range    Hemoglobin A1C 5.2 %    Lot Number [id number]     Expiration Date 01/19/2024     "     ASSESSMENT/PLAN    Diagnoses and all orders for this visit:    1. Health care maintenance (Primary)  Comments:  Routine labs within normal limits.  Orders:  -     CBC & Differential; Future  -     Comprehensive Metabolic Panel; Future  -     Lipid Panel; Future  -     TSH; Future    2. Current every day smoker  Comments:  Restart Chantix, refill ordered.  Orders:  -     varenicline (CHANTIX JOAN) 0.5 MG X 11 & 1 MG X 42 tablet; Take 0.5 mg by mouth daily for 3 days, then 0.5 mg by mouth  2 times a day  for 4 days, then 1 mg by mouth two times a day  Dispense: 53 tablet; Refill: 0  -     varenicline (CHANTIX) 1 MG tablet; Start after completing starter pack then....Take 1 tablet by mouth 2 (Two) Times a Day  Dispense: 56 tablet; Refill: 4    3. Tingling of both feet  Comments:  Hemoglobin a1c wnl. Discussed referral for nerve conduction study. Continue to monitor symptoms.  Orders:  -     POC Glycosylated Hemoglobin (Hb A1C)    Other orders  -     alclometasone (ACLOVATE) 0.05 % cream; Apply to the ears and affected areas in small amounts at bedtime as needed for no longer than 3-4 days at a time.  Dispense: 60 g; Refill: 2             Return in 6 months (on 12/2/2022), or if symptoms worsen or fail to improve, for Annual with fasting labs.

## 2022-06-10 ENCOUNTER — SPECIALTY PHARMACY (OUTPATIENT)
Dept: PHARMACY | Facility: TELEHEALTH | Age: 62
End: 2022-06-10

## 2022-06-10 NOTE — PROGRESS NOTES
Specialty Pharmacy Refill Coordination Note     Sherin is a 62 y.o. female contacted today regarding refills of  Humira 40mg/0.4ml specialty medication(s).    Reviewed and verified with patient:         Specialty medication(s) and dose(s) confirmed: yes    Refill Questions    Flowsheet Row Most Recent Value   Changes to allergies? No   Changes to medications? No   New conditions since last clinic visit No   Unplanned office visit, urgent care, ED, or hospital admission in the last 4 weeks  No   How does patient/caregiver feel medication is working? Very good   Financial problems or insurance changes  No   Since the previous refill, were any specialty medication doses or scheduled injections missed or delayed?  No   Does this patient require a clinical escalation to a pharmacist? No                Medication Adherence    Adherence tools used: directed education  Support network for adherence: family member          Follow-up: 21 day(s)     Savannah Montiel, Pharmacy Technician  Specialty Pharmacy Technician

## 2022-06-15 ENCOUNTER — HOSPITAL ENCOUNTER (EMERGENCY)
Facility: HOSPITAL | Age: 62
Discharge: HOME | End: 2022-06-16
Attending: EMERGENCY MEDICINE | Admitting: EMERGENCY MEDICINE

## 2022-06-15 DIAGNOSIS — G93.0 INTRACRANIAL ARACHNOID CYST: ICD-10-CM

## 2022-06-15 DIAGNOSIS — H81.399 PERIPHERAL VERTIGO, UNSPECIFIED LATERALITY: Primary | ICD-10-CM

## 2022-06-15 DIAGNOSIS — R11.2 NAUSEA AND VOMITING, UNSPECIFIED VOMITING TYPE: ICD-10-CM

## 2022-06-15 LAB
ALBUMIN SERPL-MCNC: 4.4 G/DL (ref 3.5–5.2)
ALBUMIN/GLOB SERPL: 1.4 G/DL
ALP SERPL-CCNC: 72 U/L (ref 39–117)
ALT SERPL W P-5'-P-CCNC: 17 U/L (ref 1–33)
ANION GAP SERPL CALCULATED.3IONS-SCNC: 12 MMOL/L (ref 5–15)
AST SERPL-CCNC: 27 U/L (ref 1–32)
BASOPHILS # BLD AUTO: 0.06 10*3/MM3 (ref 0–0.2)
BASOPHILS NFR BLD AUTO: 0.5 % (ref 0–1.5)
BILIRUB SERPL-MCNC: 0.2 MG/DL (ref 0–1.2)
BUN SERPL-MCNC: 13 MG/DL (ref 8–23)
BUN/CREAT SERPL: 21 (ref 7–25)
CALCIUM SPEC-SCNC: 9.5 MG/DL (ref 8.6–10.5)
CHLORIDE SERPL-SCNC: 106 MMOL/L (ref 98–107)
CO2 SERPL-SCNC: 22 MMOL/L (ref 22–29)
CREAT SERPL-MCNC: 0.62 MG/DL (ref 0.57–1)
DEPRECATED RDW RBC AUTO: 46.5 FL (ref 37–54)
EGFRCR SERPLBLD CKD-EPI 2021: 102.1 ML/MIN/1.73
EOSINOPHIL # BLD AUTO: 0.16 10*3/MM3 (ref 0–0.4)
EOSINOPHIL NFR BLD AUTO: 1.3 % (ref 0.3–6.2)
ERYTHROCYTE [DISTWIDTH] IN BLOOD BY AUTOMATED COUNT: 13.2 % (ref 12.3–15.4)
GLOBULIN UR ELPH-MCNC: 3.1 GM/DL
GLUCOSE SERPL-MCNC: 103 MG/DL (ref 65–99)
HCT VFR BLD AUTO: 41.7 % (ref 34–46.6)
HGB BLD-MCNC: 14.2 G/DL (ref 12–15.9)
HOLD SPECIMEN: NORMAL
HOLD SPECIMEN: NORMAL
IMM GRANULOCYTES # BLD AUTO: 0.07 10*3/MM3 (ref 0–0.05)
IMM GRANULOCYTES NFR BLD AUTO: 0.6 % (ref 0–0.5)
LYMPHOCYTES # BLD AUTO: 4.33 10*3/MM3 (ref 0.7–3.1)
LYMPHOCYTES NFR BLD AUTO: 34.6 % (ref 19.6–45.3)
MAGNESIUM SERPL-MCNC: 2.2 MG/DL (ref 1.6–2.4)
MCH RBC QN AUTO: 32.5 PG (ref 26.6–33)
MCHC RBC AUTO-ENTMCNC: 34.1 G/DL (ref 31.5–35.7)
MCV RBC AUTO: 95.4 FL (ref 79–97)
MONOCYTES # BLD AUTO: 1.04 10*3/MM3 (ref 0.1–0.9)
MONOCYTES NFR BLD AUTO: 8.3 % (ref 5–12)
NEUTROPHILS NFR BLD AUTO: 54.7 % (ref 42.7–76)
NEUTROPHILS NFR BLD AUTO: 6.85 10*3/MM3 (ref 1.7–7)
NRBC BLD AUTO-RTO: 0 /100 WBC (ref 0–0.2)
PLATELET # BLD AUTO: 255 10*3/MM3 (ref 140–450)
PMV BLD AUTO: 8.5 FL (ref 6–12)
POTASSIUM SERPL-SCNC: 3.8 MMOL/L (ref 3.5–5.2)
PROT SERPL-MCNC: 7.5 G/DL (ref 6–8.5)
RBC # BLD AUTO: 4.37 10*6/MM3 (ref 3.77–5.28)
SODIUM SERPL-SCNC: 140 MMOL/L (ref 136–145)
TROPONIN T SERPL-MCNC: <0.01 NG/ML (ref 0–0.03)
WBC NRBC COR # BLD: 12.51 10*3/MM3 (ref 3.4–10.8)
WHOLE BLOOD HOLD COAG: NORMAL
WHOLE BLOOD HOLD SPECIMEN: NORMAL

## 2022-06-15 PROCEDURE — 83735 ASSAY OF MAGNESIUM: CPT | Performed by: EMERGENCY MEDICINE

## 2022-06-15 PROCEDURE — 84484 ASSAY OF TROPONIN QUANT: CPT | Performed by: EMERGENCY MEDICINE

## 2022-06-15 PROCEDURE — 93005 ELECTROCARDIOGRAM TRACING: CPT | Performed by: EMERGENCY MEDICINE

## 2022-06-15 PROCEDURE — 25010000002 DIAZEPAM PER 5 MG: Performed by: EMERGENCY MEDICINE

## 2022-06-15 PROCEDURE — 80053 COMPREHEN METABOLIC PANEL: CPT | Performed by: EMERGENCY MEDICINE

## 2022-06-15 PROCEDURE — 25010000002 ONDANSETRON PER 1 MG: Performed by: PHYSICIAN ASSISTANT

## 2022-06-15 PROCEDURE — 96374 THER/PROPH/DIAG INJ IV PUSH: CPT

## 2022-06-15 PROCEDURE — 85025 COMPLETE CBC W/AUTO DIFF WBC: CPT

## 2022-06-15 PROCEDURE — 96375 TX/PRO/DX INJ NEW DRUG ADDON: CPT

## 2022-06-15 PROCEDURE — 99283 EMERGENCY DEPT VISIT LOW MDM: CPT

## 2022-06-15 PROCEDURE — 93005 ELECTROCARDIOGRAM TRACING: CPT

## 2022-06-15 RX ORDER — DIAZEPAM 5 MG/ML
5 INJECTION, SOLUTION INTRAMUSCULAR; INTRAVENOUS ONCE
Status: COMPLETED | OUTPATIENT
Start: 2022-06-15 | End: 2022-06-15

## 2022-06-15 RX ORDER — SODIUM CHLORIDE 0.9 % (FLUSH) 0.9 %
10 SYRINGE (ML) INJECTION AS NEEDED
Status: DISCONTINUED | OUTPATIENT
Start: 2022-06-15 | End: 2022-06-16 | Stop reason: HOSPADM

## 2022-06-15 RX ORDER — MECLIZINE HYDROCHLORIDE 25 MG/1
25 TABLET ORAL ONCE
Status: COMPLETED | OUTPATIENT
Start: 2022-06-15 | End: 2022-06-15

## 2022-06-15 RX ORDER — ONDANSETRON 2 MG/ML
4 INJECTION INTRAMUSCULAR; INTRAVENOUS ONCE
Status: COMPLETED | OUTPATIENT
Start: 2022-06-15 | End: 2022-06-15

## 2022-06-15 RX ADMIN — MECLIZINE HYDROCHLORIDE 25 MG: 25 TABLET ORAL at 23:50

## 2022-06-15 RX ADMIN — ONDANSETRON 4 MG: 2 INJECTION INTRAMUSCULAR; INTRAVENOUS at 22:49

## 2022-06-15 RX ADMIN — DIAZEPAM 5 MG: 5 INJECTION, SOLUTION INTRAMUSCULAR; INTRAVENOUS at 23:50

## 2022-06-15 RX ADMIN — SODIUM CHLORIDE 1000 ML: 9 INJECTION, SOLUTION INTRAVENOUS at 23:50

## 2022-06-16 ENCOUNTER — APPOINTMENT (OUTPATIENT)
Dept: MRI IMAGING | Facility: HOSPITAL | Age: 62
End: 2022-06-16

## 2022-06-16 VITALS
TEMPERATURE: 97.9 F | HEIGHT: 65 IN | SYSTOLIC BLOOD PRESSURE: 113 MMHG | DIASTOLIC BLOOD PRESSURE: 63 MMHG | WEIGHT: 150 LBS | BODY MASS INDEX: 24.99 KG/M2 | OXYGEN SATURATION: 94 % | RESPIRATION RATE: 16 BRPM | HEART RATE: 94 BPM

## 2022-06-16 LAB — HOLD SPECIMEN: NORMAL

## 2022-06-16 PROCEDURE — 70551 MRI BRAIN STEM W/O DYE: CPT

## 2022-06-16 RX ORDER — MECLIZINE HYDROCHLORIDE 25 MG/1
25 TABLET ORAL 3 TIMES DAILY PRN
Qty: 10 TABLET | Refills: 0 | Status: SHIPPED | OUTPATIENT
Start: 2022-06-16 | End: 2023-02-28

## 2022-06-16 RX ORDER — MECLIZINE HYDROCHLORIDE 25 MG/1
25 TABLET ORAL ONCE
Status: COMPLETED | OUTPATIENT
Start: 2022-06-16 | End: 2022-06-16

## 2022-06-16 RX ORDER — ONDANSETRON 4 MG/1
4 TABLET, FILM COATED ORAL EVERY 6 HOURS PRN
Qty: 8 TABLET | Refills: 0 | Status: SHIPPED | OUTPATIENT
Start: 2022-06-16 | End: 2023-02-28

## 2022-06-16 RX ORDER — DIAZEPAM 2 MG/1
2-4 TABLET ORAL EVERY 12 HOURS PRN
Qty: 12 TABLET | Refills: 0 | Status: SHIPPED | OUTPATIENT
Start: 2022-06-16 | End: 2023-02-28

## 2022-06-16 RX ADMIN — MECLIZINE HYDROCHLORIDE 25 MG: 25 TABLET ORAL at 04:55

## 2022-06-17 LAB
QT INTERVAL: 348 MS
QTC INTERVAL: 430 MS

## 2022-06-26 NOTE — ED PROVIDER NOTES
Subjective   Pt is a pleasant 62 year old female who presents with vertigo.  She states that two hours ago she was working when she began to feel dizzy and nauseous.  She also experienced vomiting.  She denies fever, chills, chest pain, shortness of breath, abdominal pain, or other acute complaints.      Dizziness  Quality:  Vertigo  Severity:  Severe  Onset quality:  Sudden  Timing:  Constant  Progression:  Waxing and waning  Chronicity:  New  Context: head movement    Relieved by:  Nothing  Worsened by:  Eye movement and movement  Ineffective treatments:  None tried  Associated symptoms: nausea and vomiting    Associated symptoms: no blood in stool, no chest pain, no diarrhea, no hearing loss, no palpitations, no shortness of breath and no weakness        Review of Systems   HENT: Negative for hearing loss.    Respiratory: Negative for shortness of breath.    Cardiovascular: Negative for chest pain and palpitations.   Gastrointestinal: Positive for nausea and vomiting. Negative for blood in stool and diarrhea.   Neurological: Positive for dizziness. Negative for weakness.   All other systems reviewed and are negative.      Past Medical History:   Diagnosis Date   • Acrochordon    • Acute maxillary sinusitis    • Acute otitis media    • Breast cancer (HCC) 2005    left   • Dermatitis    • Drug therapy 2005   • Eczema    • H/O alcohol abuse    • Herpes    • History of breast cancer    • History of cervical dysplasia    • History of MRSA infection    • Hx of radiation therapy 2005   • Joint pain, hip    • Joint pain, knee    • Osteoarthritis    • Psoriasis    • Scabies    • Tendonitis    • Varicose veins of legs    • Viral syndrome    • Weight gain        Allergies   Allergen Reactions   • Meloxicam Nausea Only       Past Surgical History:   Procedure Laterality Date   • BREAST BIOPSY Left 2005   • BREAST LUMPECTOMY Left 2005    lumpectomy   • LAPAROSCOPIC ASSISTED VAGINAL HYSTERECTOMY SALPINGO OOPHORECTOMY Bilateral     • OOPHORECTOMY         Family History   Problem Relation Age of Onset   • Breast cancer Mother 40   • Breast cancer Sister 43   • Ovarian cancer Sister 40   • Breast cancer Maternal Aunt 45   • Cancer Brother         kidney       Social History     Socioeconomic History   • Marital status:    Tobacco Use   • Smoking status: Former Smoker     Packs/day: 1.00     Start date: 2020   • Smokeless tobacco: Never Used   Substance and Sexual Activity   • Alcohol use: No   • Drug use: No   • Sexual activity: Not Currently           Objective   Physical Exam  Vitals and nursing note reviewed.   Constitutional:       General: She is not in acute distress.  HENT:      Head: Normocephalic and atraumatic.   Eyes:      Conjunctiva/sclera: Conjunctivae normal.      Pupils: Pupils are equal, round, and reactive to light.      Comments: Horizontal nystagmus present   Neck:      Thyroid: No thyromegaly.   Cardiovascular:      Rate and Rhythm: Normal rate and regular rhythm.      Heart sounds: Normal heart sounds. No murmur heard.    No friction rub. No gallop.   Pulmonary:      Effort: Pulmonary effort is normal. No respiratory distress.      Breath sounds: Normal breath sounds.   Abdominal:      General: Bowel sounds are normal.      Palpations: Abdomen is soft.      Tenderness: There is no abdominal tenderness.   Musculoskeletal:         General: Normal range of motion.      Cervical back: Normal range of motion and neck supple.   Lymphadenopathy:      Cervical: No cervical adenopathy.   Skin:     General: Skin is warm and dry.   Neurological:      Mental Status: She is alert and oriented to person, place, and time.   Psychiatric:         Behavior: Behavior normal.         Procedures           ED Course      MRI Brain Without Contrast    Result Date: 6/16/2022  EXAMINATION: MRI BRAIN WO CONTRAST DATE: 6/16/2022 2:08 AM  INDICATION: Vertigo. COMPARISON: MRI brain December 7, 2005. TECHNIQUE: Multiplanar noncontrast imaging  of the brain was performed in the usual manner. FINDINGS: No diffusion signal abnormality to suggest acute ischemia. No acute hemorrhage. No extra-axial fluid collection. Few foci of punctate T2/flair white matter hyperintensities are not excessive for patient's age. 1.4 x 0.6 cm CSF density focus in the left premedullary cistern with abrupt change in signal intensity compared to the remainder of the prepontine cistern on FLAIR sequence suggestive of different CSF flow characteristics (axial flair series 9 image 4). This appears unchanged since MRI brain dated September 7, 2005. No significant mass effect upon the underlying brain parenchyma. This finding is just inferior to the expected cisternal course of the 7th and 8th cranial nerves and the porus acusticus. No hydrocephalus. Basal cisterns are patent. Expected flow voids are seen in the major intracranial vessels. Orbital structures are unremarkable. Imaged paranasal sinuses are essentially clear. Imaged mastoid air cells are essentially clear. Visualized portions of the upper neck are unremarkable. Marrow signal in the skull base is normal.     1.  No acute intracranial abnormality. No acute infarct. 2.  Suspected 1.4 cm arachnoid cyst in the left premedullary cistern. This is unchanged since December 7, 2005. No significant mass effect. If the patient continues to have vertigo, consider further evaluation with an MRI of the internal auditory canal. Electronically signed by:  Cirilo Bruno DO  6/16/2022 12:54 AM Mountain Time            Latest Reference Range & Units 06/15/22 19:52 06/15/22 21:57   Troponin T 0.000 - 0.030 ng/mL  <0.010   Glucose 65 - 99 mg/dL  103 (H)   Sodium 136 - 145 mmol/L  140   Potassium 3.5 - 5.2 mmol/L  3.8 [1]   CO2 22.0 - 29.0 mmol/L  22.0   Chloride 98 - 107 mmol/L  106   Anion Gap 5.0 - 15.0 mmol/L  12.0   Creatinine 0.57 - 1.00 mg/dL  0.62   BUN 8 - 23 mg/dL  13   BUN/Creatinine Ratio 7.0 - 25.0   21.0   Calcium 8.6 - 10.5 mg/dL   9.5   EGFR >60.0 mL/min/1.73  102.1 [2]   Alkaline Phosphatase 39 - 117 U/L  72   Total Protein 6.0 - 8.5 g/dL  7.5   ALT (SGPT) 1 - 33 U/L  17 [3]   AST (SGOT) 1 - 32 U/L  27   Total Bilirubin 0.0 - 1.2 mg/dL  0.2   Albumin 3.50 - 5.20 g/dL  4.40   Globulin gm/dL  3.1 [4]   A/G Ratio g/dL  1.4   Magnesium 1.6 - 2.4 mg/dL  2.2   WBC 3.40 - 10.80 10*3/mm3 12.51 (H)    RBC 3.77 - 5.28 10*6/mm3 4.37    Hemoglobin 12.0 - 15.9 g/dL 14.2    Hematocrit 34.0 - 46.6 % 41.7    RDW 12.3 - 15.4 % 13.2    MCV 79.0 - 97.0 fL 95.4    MCH 26.6 - 33.0 pg 32.5    MCHC 31.5 - 35.7 g/dL 34.1    MPV 6.0 - 12.0 fL 8.5    Platelets 140 - 450 10*3/mm3 255    RDW-SD 37.0 - 54.0 fl 46.5    Neutrophil Rel % 42.7 - 76.0 % 54.7    Lymphocyte Rel % 19.6 - 45.3 % 34.6    Monocyte Rel % 5.0 - 12.0 % 8.3    Eosinophil Rel % 0.3 - 6.2 % 1.3    Basophil Rel % 0.0 - 1.5 % 0.5    Immature Granulocyte Rel % 0.0 - 0.5 % 0.6 (H)    Neutrophils Absolute 1.70 - 7.00 10*3/mm3 6.85    Lymphocytes Absolute 0.70 - 3.10 10*3/mm3 4.33 (H)    Monocytes Absolute 0.10 - 0.90 10*3/mm3 1.04 (H)    Eosinophils Absolute 0.00 - 0.40 10*3/mm3 0.16    Basophils Absolute 0.00 - 0.20 10*3/mm3 0.06    Immature Grans, Absolute 0.00 - 0.05 10*3/mm3 0.07 (H)    NRBC 0.0 - 0.2 /100 WBC 0.0    (H): Data is abnormally high  [1] Specimen hemolyzed.  Results may be affected.  [2] National Kidney Foundation and American Society of Nephrology (ASN) Task Force recommended calculation based on the Chronic Kidney Disease Epidemiology Collaboration (CKD-EPI) equation refit without adjustment for race.  [3] Specimen hemolyzed.  Results may be affected.  [4] Calculated Result                                MDM    Final diagnoses:   Peripheral vertigo, unspecified laterality   Nausea and vomiting, unspecified vomiting type   Intracranial arachnoid cyst       ED Disposition  ED Disposition     ED Disposition   Discharge    Condition   Stable    Comment   --             Sonya Mayes,  MD  [address]  [place] [address]  [phone]    Schedule an appointment as soon as possible for a visit       Elidia Julian PA  [address] [place] [id number] [phone]    Schedule an appointment as soon as possible for a visit       [facility] Emergency Department  [address] [id number] [phone]    If symptoms worsen    Marcello Clarke MD  [address]  [facility] [address] [phone]    Schedule an appointment as soon as possible for a visit            Medication List      New Prescriptions    diazePAM 2 MG tablet  Commonly known as: VALIUM  Take 1-2 tablets by mouth Every 12 (Twelve) Hours As Needed for Anxiety.     meclizine 25 MG tablet  Commonly known as: ANTIVERT  Take 1 tablet by mouth 3 (Three) Times a Day As Needed for Dizziness.     ondansetron 4 MG tablet  Commonly known as: ZOFRAN  Take 1 tablet by mouth Every 6 (Six) Hours As Needed for Nausea or Vomiting.           Where to Get Your Medications      These medications were sent to [facility]  [address] [place] [address]    Hours: 7:00 AM-5:30 PM M-F, 8:00 AM-4:30 PM Sat-Sun Phone: [phone]   · diazePAM 2 MG tablet  · meclizine 25 MG tablet  · ondansetron 4 MG tablet          Geronimo Novoa DO  06/26/22 [id number]

## 2022-07-06 ENCOUNTER — SPECIALTY PHARMACY (OUTPATIENT)
Dept: PHARMACY | Facility: TELEHEALTH | Age: 62
End: 2022-07-06

## 2022-07-06 DIAGNOSIS — E78.5 DYSLIPIDEMIA: ICD-10-CM

## 2022-07-06 RX ORDER — PRAVASTATIN SODIUM 10 MG
10 TABLET ORAL DAILY
Qty: 90 TABLET | Refills: 0 | OUTPATIENT
Start: 2022-07-06

## 2022-07-06 NOTE — PROGRESS NOTES
Specialty Pharmacy Patient Management Program  Call Center Refill Outreach      Sherin is a 62 y.o. female contacted today regarding refills of her medication(s).    Specialty medication(s) and dose(s) confirmed: Humira 40mg/0.4ml  Other medications being refilled: N/A    Refill Questions    Flowsheet Row Most Recent Value   Changes to allergies? No   Changes to medications? No   New conditions since last clinic visit No   Unplanned office visit, urgent care, ED, or hospital admission in the last 4 weeks  Yes   [Patient developed vertigo and visited ER]   How does patient/caregiver feel medication is working? Very good   Financial problems or insurance changes  No   Since the previous refill, were any specialty medication doses or scheduled injections missed or delayed?  No   Does this patient require a clinical escalation to a pharmacist? Yes          Delivery Questions    Flowsheet Row Most Recent Value   Delivery method  at Pharmacy   Delivery address correct? Yes   Is there any medication that is due not being filled? No   Supplies needed? No supplies needed   Cooler needed? Yes   Do any medications need mixed or dated? No   Copay form of payment Pay at pickup   Questions or concerns for the pharmacist? No   Are any medications first time fills? No          Medication Adherence    Adherence tools used: directed education  Support network for adherence: family member            Follow-up: 21d     Yoseph Green CPhT  Care Coordinator, Specialty Pharmacy Call Center  7/6/2022  11:30 EDT

## 2022-07-29 ENCOUNTER — SPECIALTY PHARMACY (OUTPATIENT)
Dept: PHARMACY | Facility: TELEHEALTH | Age: 62
End: 2022-07-29

## 2022-07-29 NOTE — PROGRESS NOTES
Specialty Pharmacy Patient Management Program  Call Center Refill Outreach      Sherin is a 62 y.o. female contacted today regarding refills of her medication(s).    Specialty medication(s) and dose(s) confirmed: Humira 40mg/0.4ml    Refill Questions    Flowsheet Row Most Recent Value   Changes to allergies? No   Changes to medications? No   New conditions since last clinic visit No   Unplanned office visit, urgent care, ED, or hospital admission in the last 4 weeks  No   How does patient/caregiver feel medication is working? Very good   Financial problems or insurance changes  No   Since the previous refill, were any specialty medication doses or scheduled injections missed or delayed?  No   Does this patient require a clinical escalation to a pharmacist? No              Medication Adherence    Adherence tools used: directed education  Support network for adherence: family member            Follow-up: 21 Days     Savannah Montiel CPhT  Care Coordinator, Specialty Pharmacy Call Center  [phone]  7/29/2022  14:49 EDT

## 2022-08-15 DIAGNOSIS — E78.5 DYSLIPIDEMIA: ICD-10-CM

## 2022-08-15 RX ORDER — PRAVASTATIN SODIUM 10 MG
10 TABLET ORAL DAILY
Qty: 90 TABLET | Refills: 0 | OUTPATIENT
Start: 2022-08-15

## 2022-08-22 ENCOUNTER — LAB (OUTPATIENT)
Dept: LAB | Facility: HOSPITAL | Age: 62
End: 2022-08-22

## 2022-08-22 ENCOUNTER — TRANSCRIBE ORDERS (OUTPATIENT)
Dept: LAB | Facility: HOSPITAL | Age: 62
End: 2022-08-22

## 2022-08-22 DIAGNOSIS — L40.50 PSORIATIC ARTHRITIS: Primary | ICD-10-CM

## 2022-08-22 DIAGNOSIS — L40.50 PSORIATIC ARTHRITIS: ICD-10-CM

## 2022-08-22 LAB
ALBUMIN SERPL-MCNC: 4.3 G/DL (ref 3.5–5.2)
ALBUMIN/GLOB SERPL: 1.9 G/DL
ALP SERPL-CCNC: 63 U/L (ref 39–117)
ALT SERPL W P-5'-P-CCNC: 14 U/L (ref 1–33)
ANION GAP SERPL CALCULATED.3IONS-SCNC: 11.8 MMOL/L (ref 5–15)
AST SERPL-CCNC: 15 U/L (ref 1–32)
BASOPHILS # BLD AUTO: 0.05 10*3/MM3 (ref 0–0.2)
BASOPHILS NFR BLD AUTO: 0.4 % (ref 0–1.5)
BILIRUB SERPL-MCNC: 0.3 MG/DL (ref 0–1.2)
BUN SERPL-MCNC: 9 MG/DL (ref 8–23)
BUN/CREAT SERPL: 13.8 (ref 7–25)
CALCIUM SPEC-SCNC: 9.2 MG/DL (ref 8.6–10.5)
CHLORIDE SERPL-SCNC: 105 MMOL/L (ref 98–107)
CO2 SERPL-SCNC: 23.2 MMOL/L (ref 22–29)
CREAT SERPL-MCNC: 0.65 MG/DL (ref 0.57–1)
CRP SERPL-MCNC: <0.3 MG/DL (ref 0–0.5)
DEPRECATED RDW RBC AUTO: 43 FL (ref 37–54)
EGFRCR SERPLBLD CKD-EPI 2021: 100.9 ML/MIN/1.73
EOSINOPHIL # BLD AUTO: 0.16 10*3/MM3 (ref 0–0.4)
EOSINOPHIL NFR BLD AUTO: 1.3 % (ref 0.3–6.2)
ERYTHROCYTE [DISTWIDTH] IN BLOOD BY AUTOMATED COUNT: 12.9 % (ref 12.3–15.4)
ERYTHROCYTE [SEDIMENTATION RATE] IN BLOOD: 7 MM/HR (ref 0–30)
GLOBULIN UR ELPH-MCNC: 2.3 GM/DL
GLUCOSE SERPL-MCNC: 88 MG/DL (ref 65–99)
HCT VFR BLD AUTO: 40.3 % (ref 34–46.6)
HGB BLD-MCNC: 14.1 G/DL (ref 12–15.9)
IMM GRANULOCYTES # BLD AUTO: 0.09 10*3/MM3 (ref 0–0.05)
IMM GRANULOCYTES NFR BLD AUTO: 0.7 % (ref 0–0.5)
LYMPHOCYTES # BLD AUTO: 4.42 10*3/MM3 (ref 0.7–3.1)
LYMPHOCYTES NFR BLD AUTO: 36.7 % (ref 19.6–45.3)
MCH RBC QN AUTO: 32.3 PG (ref 26.6–33)
MCHC RBC AUTO-ENTMCNC: 35 G/DL (ref 31.5–35.7)
MCV RBC AUTO: 92.2 FL (ref 79–97)
MONOCYTES # BLD AUTO: 1.01 10*3/MM3 (ref 0.1–0.9)
MONOCYTES NFR BLD AUTO: 8.4 % (ref 5–12)
NEUTROPHILS NFR BLD AUTO: 52.5 % (ref 42.7–76)
NEUTROPHILS NFR BLD AUTO: 6.32 10*3/MM3 (ref 1.7–7)
NRBC BLD AUTO-RTO: 0 /100 WBC (ref 0–0.2)
PLATELET # BLD AUTO: 234 10*3/MM3 (ref 140–450)
PMV BLD AUTO: 8.5 FL (ref 6–12)
POTASSIUM SERPL-SCNC: 3.8 MMOL/L (ref 3.5–5.2)
PROT SERPL-MCNC: 6.6 G/DL (ref 6–8.5)
RBC # BLD AUTO: 4.37 10*6/MM3 (ref 3.77–5.28)
SODIUM SERPL-SCNC: 140 MMOL/L (ref 136–145)
WBC NRBC COR # BLD: 12.05 10*3/MM3 (ref 3.4–10.8)

## 2022-08-22 PROCEDURE — 86140 C-REACTIVE PROTEIN: CPT

## 2022-08-22 PROCEDURE — 85025 COMPLETE CBC W/AUTO DIFF WBC: CPT

## 2022-08-22 PROCEDURE — 80053 COMPREHEN METABOLIC PANEL: CPT

## 2022-08-22 PROCEDURE — 36415 COLL VENOUS BLD VENIPUNCTURE: CPT

## 2022-08-22 PROCEDURE — 85652 RBC SED RATE AUTOMATED: CPT

## 2022-08-25 ENCOUNTER — SPECIALTY PHARMACY (OUTPATIENT)
Dept: PHARMACY | Facility: TELEHEALTH | Age: 62
End: 2022-08-25

## 2022-08-25 NOTE — PROGRESS NOTES
Specialty Pharmacy Patient Management Program  Call Center Refill Outreach      Sherin is a 62 y.o. female contacted today regarding refills of her medication(s).    Specialty medication(s) and dose(s) confirmed: Humira 40mg/0.4ml    Refill Questions    Flowsheet Row Most Recent Value   Changes to allergies? No   Changes to medications? No   New conditions since last clinic visit No   Unplanned office visit, urgent care, ED, or hospital admission in the last 4 weeks  No   How does patient/caregiver feel medication is working? Very good   Financial problems or insurance changes  No   Since the previous refill, were any specialty medication doses or scheduled injections missed or delayed?  No   Does this patient require a clinical escalation to a pharmacist? No              Medication Adherence    Adherence tools used: directed education  Support network for adherence: family member            Follow-up: 21 Days     Savannah Montiel CPhT  Care Coordinator, Specialty Pharmacy Call Center  [phone]  8/25/2022  15:11 EDT

## 2022-09-22 ENCOUNTER — SPECIALTY PHARMACY (OUTPATIENT)
Dept: PHARMACY | Facility: TELEHEALTH | Age: 62
End: 2022-09-22

## 2022-09-22 NOTE — PROGRESS NOTES
Specialty Pharmacy Patient Management Program  Call Center Refill Outreach      Sherin is a 62 y.o. female contacted today regarding refills of her medication(s).    Specialty medication(s) and dose(s) confirmed: Humira 40mg/0.4ml    Refill Questions    Flowsheet Row Most Recent Value   Changes to allergies? No   Changes to medications? No   New conditions since last clinic visit No   Unplanned office visit, urgent care, ED, or hospital admission in the last 4 weeks  No   How does patient/caregiver feel medication is working? Very good   Financial problems or insurance changes  No   Since the previous refill, were any specialty medication doses or scheduled injections missed or delayed?  No   Does this patient require a clinical escalation to a pharmacist? No              Medication Adherence    Adherence tools used: directed education  Support network for adherence: family member            Follow-up: 21 Days     Savannah Montiel CPhT  Care Coordinator, Specialty Pharmacy Call Center  [phone]  9/22/2022  15:55 EDT

## 2022-09-28 ENCOUNTER — SPECIALTY PHARMACY (OUTPATIENT)
Dept: PHARMACY | Facility: TELEHEALTH | Age: 62
End: 2022-09-28

## 2022-10-17 ENCOUNTER — SPECIALTY PHARMACY (OUTPATIENT)
Dept: PHARMACY | Facility: TELEHEALTH | Age: 62
End: 2022-10-17

## 2022-10-17 NOTE — PROGRESS NOTES
Specialty Pharmacy Patient Management Program  Call Center Refill Outreach      Sherin is a 62 y.o. female contacted today regarding refills of her medication(s).    Specialty medication(s) and dose(s) confirmed: Humira Pen 40mg/0.4ml  Other medications being refilled: none    Refill Questions    Flowsheet Row Most Recent Value   Changes to allergies? No   Changes to medications? No   New conditions since last clinic visit No   Unplanned office visit, urgent care, ED, or hospital admission in the last 4 weeks  No   How does patient/caregiver feel medication is working? Good   Financial problems or insurance changes  No   Since the previous refill, were any specialty medication doses or scheduled injections missed or delayed?  No   Does this patient require a clinical escalation to a pharmacist? No              Medication Adherence    Adherence tools used: directed education  Support network for adherence: family member            Follow-up: 3 weeks     Alejandro Gant RPH  Specialty Pharmacist  10/17/2022  16:01 EDT

## 2022-10-17 NOTE — PROGRESS NOTES
Specialty Pharmacy Patient Management Program  Reassessment     Sherin Azul is a 62 y.o. female with psoriasis and enrolled in the Patient Management program offered by [facility].  A follow-up outreach was conducted, including assessment of continued therapy appropriateness, medication adherence, and side effect incidence and management for Humira Pen 40mg/0.4ml.     Changes to Insurance Coverage or Financial Support  none    Relevant Past Medical History and Comorbidities  Relevant medical history and concomitant health conditions were discussed with the patient. The patient's chart has been reviewed for relevant past medical history and comorbid health conditions and updated as necessary.   Past Medical History:   Diagnosis Date   • Acrochordon    • Acute maxillary sinusitis    • Acute otitis media    • Breast cancer (HCC) 2005    left   • Dermatitis    • Drug therapy 2005   • Eczema    • H/O alcohol abuse    • Herpes    • History of breast cancer    • History of cervical dysplasia    • History of MRSA infection    • Hx of radiation therapy 2005   • Joint pain, hip    • Joint pain, knee    • Osteoarthritis    • Psoriasis    • Scabies    • Tendonitis    • Varicose veins of legs    • Viral syndrome    • Weight gain      Social History     Socioeconomic History   • Marital status:    Tobacco Use   • Smoking status: Former     Packs/day: 1.00     Types: Cigarettes     Start date: 2020   • Smokeless tobacco: Never   Substance and Sexual Activity   • Alcohol use: No   • Drug use: No   • Sexual activity: Not Currently       Allergies  Known allergies and reactions were discussed with the patient. The patient's chart has been reviewed for allergy information and updated as necessary.   Meloxicam    Relevant Laboratory Values  No results for input(s): CMP, BMP, CBC in the last 72 hours.    Current Medication List  This medication list has been reviewed with the patient and  evaluated for any interactions or necessary modifications/recommendations, and updated to include all prescription medications, OTC medications, and supplements the patient is currently taking.  This list reflects what is contained in the patient's profile, which has also been marked as reviewed to communicate to other providers it is the most up to date version of the patient's current medication therapy.     Current Outpatient Medications:   •  Adalimumab (Humira Pen) 40 MG/0.4ML Pen-injector Kit, Inject 40 mg under the skin into the appropriate area as directed Every Other Week., Disp: 2 each, Rfl: 2  •  alclometasone (ACLOVATE) 0.05 % cream, Apply to the ears and affected areas in small amounts at bedtime as needed for no longer than 3-4 days at a time., Disp: 60 g, Rfl: 2  •  clobetasol (TEMOVATE) 0.05 % external solution, Apply and gently massage onto affected area(s) as directed once Daily., Disp: 50 mL, Rfl: 1  •  clobetasol (TEMOVATE) 0.05 % ointment, APPLY A SMALL AMOUNT OF OINTMENT TOPICALLY TO AFFECTED AREA TWICE DAILY FOR PSORIASIS, Disp: 60 g, Rfl: 0  •  clobetasol propionate (CLOBEX) 0.05 % shampoo, Apply 1 application topically onto wet scalp. Leave lather on for 3 minutes, then rinse. Repeat the application once. Use as directed 2 (Two) Times a Week., Disp: 118 mL, Rfl: 0  •  diazePAM (VALIUM) 2 MG tablet, Take 1-2 tablets by mouth Every 12 (Twelve) Hours As Needed for Anxiety., Disp: 12 tablet, Rfl: 0  •  folic acid (FOLVITE) 1 MG tablet, Take 1 tablet by mouth Daily as directed., Disp: 30 tablet, Rfl: 2  •  HYDROcodone-acetaminophen (NORCO) 5-325 MG per tablet, , Disp: , Rfl:   •  meclizine (ANTIVERT) 25 MG tablet, Take 1 tablet by mouth 3 (Three) Times a Day As Needed for Dizziness., Disp: 10 tablet, Rfl: 0  •  ondansetron (ZOFRAN) 4 MG tablet, Take 1 tablet by mouth Every 6 (Six) Hours As Needed for Nausea or Vomiting., Disp: 8 tablet, Rfl: 0  •  pravastatin (PRAVACHOL) 10 MG tablet, Take 1  tablet by mouth Daily., Disp: 90 tablet, Rfl: 0  •  triamcinolone (KENALOG) 0.1 % cream, Apply 1 application topically to the appropriate area as directed 2 (Two) Times a Day., Disp: 80 g, Rfl: 1  •  varenicline (CHANTIX) 1 MG tablet, Start after completing starter pack then....Take 1 tablet by mouth 2 (Two) Times a Day, Disp: 56 tablet, Rfl: 4    Drug Interactions  none     Adverse Drug Reactions  • Adverse Reactions Experienced: none  • Plan for ADR Management: N/A (patient education provided, discontinue drug, pharmacist to consult provider, etc.)    Hospitalizations and Urgent Care Since Last Assessment  • Hospitalizations or Admissions: none  • ED Visits: none  • Urgent Office Visits: none     Adherence and Self-Administration  • Approximate Number of Doses Missed Since Last Assessment: none  • Ongoing or New Barriers to Patient Adherence and/or Self-Administration: none   • Methods for Supporting Patient Adherence and/or Self-Administration: N/A     Goals of Therapy  Progress Toward Meeting Patient-Identified Goals of Therapy: On Track  o New Patient-Identified Goals, If Applicable:     • Progress Toward Meeting Clinical Goals or Therapeutic Targets: On Track  o New Clinical Goals or Therapeutic Targets, If Applicable:     Quality of Life Assessment   Quality of Life related to the patient's specialty therapy was discussed with the patient. The QOL segment of this outreach has been reviewed and updated.     Quality of Life Assessment  Quality of Life Improvement Scale: No change  Comments on Quality of Life: tolerating well    Reassessment Plan & Follow-Up  1. Medication Therapy Changes: none  2. Additional Plans, Therapy Recommendations, or Therapy Problems to Be Addressed: none   3. Overall patient is doing well.  Her sister is in hospice care currently, which she thinks is causing her extra stress understandably.  She says the Humira is helping with joint pain, however, she is having psoriasis flares on her  skin.  We will follow up with her in 3 weeks to see if this has changed.  4. Pharmacist to perform regular reassessments no more than (6) months from the previous assessment.  5. Care Coordinator to set up future refill outreaches, coordinate prescription delivery, and escalate clinical questions to pharmacist.     Attestation  I attest that the specialty medication(s) addressed above are appropriate for the patient based on my reassessment.  If the prescribed therapy is at any point deemed not appropriate based on the current or future assessments, a consultation will be initiated with the patient's specialty care provider to determine the best course of action. The revised plan of therapy will be documented along with any additional patient education provided.     Electronically signed by Alejandro Gant RPH, 10/17/22, 4:01 PM EDT.

## 2022-11-15 ENCOUNTER — SPECIALTY PHARMACY (OUTPATIENT)
Dept: PHARMACY | Facility: TELEHEALTH | Age: 62
End: 2022-11-15

## 2022-11-28 ENCOUNTER — SPECIALTY PHARMACY (OUTPATIENT)
Dept: PHARMACY | Facility: TELEHEALTH | Age: 62
End: 2022-11-28

## 2022-11-28 NOTE — PROGRESS NOTES
Specialty Pharmacy Patient Management Program  Call Center Refill Outreach      Sherin is a 62 y.o. female contacted today regarding refills of her medication(s).    Specialty medication(s) and dose(s) confirmed: Humira  Other medications being refilled: N/a    Refill Questions    Flowsheet Row Most Recent Value   Changes to allergies? No   Changes to medications? No   New conditions since last clinic visit No   Unplanned office visit, urgent care, ED, or hospital admission in the last 4 weeks  No   How does patient/caregiver feel medication is working? Very good   Financial problems or insurance changes  No   Since the previous refill, were any specialty medication doses or scheduled injections missed or delayed?  No   Does this patient require a clinical escalation to a pharmacist? No              Medication Adherence    Adherence tools used: directed education  Support network for adherence: family member            Follow-up: 21 Days     Diomedes Ceron CPhT  Care Coordinator, Specialty Pharmacy Call Center  11/28/2022  16:28 EST

## 2023-01-03 ENCOUNTER — SPECIALTY PHARMACY (OUTPATIENT)
Dept: PHARMACY | Facility: TELEHEALTH | Age: 63
End: 2023-01-03
Payer: COMMERCIAL

## 2023-02-28 ENCOUNTER — OFFICE VISIT (OUTPATIENT)
Dept: INTERNAL MEDICINE | Facility: CLINIC | Age: 63
End: 2023-02-28
Payer: COMMERCIAL

## 2023-02-28 VITALS
BODY MASS INDEX: 24.86 KG/M2 | HEIGHT: 65 IN | OXYGEN SATURATION: 93 % | WEIGHT: 149.2 LBS | SYSTOLIC BLOOD PRESSURE: 110 MMHG | HEART RATE: 64 BPM | DIASTOLIC BLOOD PRESSURE: 82 MMHG

## 2023-02-28 DIAGNOSIS — Z00.00 HEALTH CARE MAINTENANCE: ICD-10-CM

## 2023-02-28 DIAGNOSIS — Z12.31 ENCOUNTER FOR SCREENING MAMMOGRAM FOR MALIGNANT NEOPLASM OF BREAST: ICD-10-CM

## 2023-02-28 DIAGNOSIS — Z85.3 HISTORY OF BREAST CANCER: ICD-10-CM

## 2023-02-28 DIAGNOSIS — Z78.0 POST-MENOPAUSAL: Primary | ICD-10-CM

## 2023-02-28 DIAGNOSIS — E78.5 DYSLIPIDEMIA: ICD-10-CM

## 2023-02-28 DIAGNOSIS — E55.9 VITAMIN D DEFICIENCY: ICD-10-CM

## 2023-02-28 DIAGNOSIS — Z12.39 BREAST CANCER SCREENING, HIGH RISK PATIENT: ICD-10-CM

## 2023-02-28 PROCEDURE — 99396 PREV VISIT EST AGE 40-64: CPT | Performed by: PHYSICIAN ASSISTANT

## 2023-02-28 RX ORDER — PRAVASTATIN SODIUM 10 MG
10 TABLET ORAL DAILY
Qty: 90 TABLET | Refills: 3 | Status: CANCELLED | OUTPATIENT
Start: 2023-02-28

## 2023-02-28 RX ORDER — OMEPRAZOLE 20 MG/1
TABLET, DELAYED RELEASE ORAL
COMMUNITY

## 2023-03-02 NOTE — ASSESSMENT & PLAN NOTE
Immunizations: shingrix to be done today; repeat in 2 months  Eye exam: done 2018  Pap Smear: no longer needed due to hysterectomy  Mammogram: done 7/2019, ordered mammogram and breast MRI  Dexa: ordered  Colonoscopy: done 2021 by Dr. Thrasher, repeat 2026  Labs: fasting labs ordered     Counseled patient regarding multimodal approach with healthy nutrition, healthy sleep, regular physical activity, social activities, counseling, safety measures and medications.

## 2023-03-06 ENCOUNTER — LAB (OUTPATIENT)
Dept: LAB | Facility: HOSPITAL | Age: 63
End: 2023-03-06
Payer: COMMERCIAL

## 2023-03-06 DIAGNOSIS — E55.9 VITAMIN D DEFICIENCY: ICD-10-CM

## 2023-03-06 DIAGNOSIS — Z00.00 HEALTH CARE MAINTENANCE: ICD-10-CM

## 2023-03-06 LAB
ALBUMIN SERPL-MCNC: 4.4 G/DL (ref 3.5–5.2)
ALBUMIN/GLOB SERPL: 1.4 G/DL
ALP SERPL-CCNC: 83 U/L (ref 39–117)
ALT SERPL W P-5'-P-CCNC: 13 U/L (ref 1–33)
ANION GAP SERPL CALCULATED.3IONS-SCNC: 8 MMOL/L (ref 5–15)
AST SERPL-CCNC: 17 U/L (ref 1–32)
BASOPHILS # BLD AUTO: 0.06 10*3/MM3 (ref 0–0.2)
BASOPHILS NFR BLD AUTO: 0.5 % (ref 0–1.5)
BILIRUB SERPL-MCNC: 0.2 MG/DL (ref 0–1.2)
BUN SERPL-MCNC: 12 MG/DL (ref 8–23)
BUN/CREAT SERPL: 20.3 (ref 7–25)
CALCIUM SPEC-SCNC: 9.7 MG/DL (ref 8.6–10.5)
CHLORIDE SERPL-SCNC: 106 MMOL/L (ref 98–107)
CHOLEST SERPL-MCNC: 177 MG/DL (ref 0–200)
CO2 SERPL-SCNC: 25 MMOL/L (ref 22–29)
CREAT SERPL-MCNC: 0.59 MG/DL (ref 0.57–1)
DEPRECATED RDW RBC AUTO: 42.7 FL (ref 37–54)
EGFRCR SERPLBLD CKD-EPI 2021: 103.3 ML/MIN/1.73
EOSINOPHIL # BLD AUTO: 0.24 10*3/MM3 (ref 0–0.4)
EOSINOPHIL NFR BLD AUTO: 2.1 % (ref 0.3–6.2)
ERYTHROCYTE [DISTWIDTH] IN BLOOD BY AUTOMATED COUNT: 12.3 % (ref 12.3–15.4)
GLOBULIN UR ELPH-MCNC: 3.2 GM/DL
GLUCOSE SERPL-MCNC: 83 MG/DL (ref 65–99)
HCT VFR BLD AUTO: 42.7 % (ref 34–46.6)
HDLC SERPL-MCNC: 50 MG/DL (ref 40–60)
HGB BLD-MCNC: 14.9 G/DL (ref 12–15.9)
IMM GRANULOCYTES # BLD AUTO: 0.06 10*3/MM3 (ref 0–0.05)
IMM GRANULOCYTES NFR BLD AUTO: 0.5 % (ref 0–0.5)
LDLC SERPL CALC-MCNC: 105 MG/DL (ref 0–100)
LDLC/HDLC SERPL: 2.04 {RATIO}
LYMPHOCYTES # BLD AUTO: 2.9 10*3/MM3 (ref 0.7–3.1)
LYMPHOCYTES NFR BLD AUTO: 25.6 % (ref 19.6–45.3)
MCH RBC QN AUTO: 32.7 PG (ref 26.6–33)
MCHC RBC AUTO-ENTMCNC: 34.9 G/DL (ref 31.5–35.7)
MCV RBC AUTO: 93.8 FL (ref 79–97)
MONOCYTES # BLD AUTO: 0.88 10*3/MM3 (ref 0.1–0.9)
MONOCYTES NFR BLD AUTO: 7.8 % (ref 5–12)
NEUTROPHILS NFR BLD AUTO: 63.5 % (ref 42.7–76)
NEUTROPHILS NFR BLD AUTO: 7.19 10*3/MM3 (ref 1.7–7)
NRBC BLD AUTO-RTO: 0.1 /100 WBC (ref 0–0.2)
PLATELET # BLD AUTO: 263 10*3/MM3 (ref 140–450)
PMV BLD AUTO: 8.7 FL (ref 6–12)
POTASSIUM SERPL-SCNC: 4.1 MMOL/L (ref 3.5–5.2)
PROT SERPL-MCNC: 7.6 G/DL (ref 6–8.5)
RBC # BLD AUTO: 4.55 10*6/MM3 (ref 3.77–5.28)
SODIUM SERPL-SCNC: 139 MMOL/L (ref 136–145)
TRIGL SERPL-MCNC: 125 MG/DL (ref 0–150)
VLDLC SERPL-MCNC: 22 MG/DL (ref 5–40)
WBC NRBC COR # BLD: 11.33 10*3/MM3 (ref 3.4–10.8)

## 2023-03-06 PROCEDURE — 82306 VITAMIN D 25 HYDROXY: CPT

## 2023-03-06 PROCEDURE — 80061 LIPID PANEL: CPT

## 2023-03-06 PROCEDURE — 80050 GENERAL HEALTH PANEL: CPT

## 2023-03-07 LAB
25(OH)D3 SERPL-MCNC: 79.7 NG/ML (ref 30–100)
TSH SERPL DL<=0.05 MIU/L-ACNC: 1.87 UIU/ML (ref 0.27–4.2)

## 2023-03-14 ENCOUNTER — TELEPHONE (OUTPATIENT)
Dept: INTERNAL MEDICINE | Facility: CLINIC | Age: 63
End: 2023-03-14
Payer: COMMERCIAL

## 2023-03-14 NOTE — TELEPHONE ENCOUNTER
"  Caller: Sherin Azul \"Amparo\"    Relationship: Self    Best call back number: [phone]    Caller requesting test results:     What test was performed: LABS    When was the test performed:  03.06.23    Where was the test performed: IN OFFICE    Additional notes: PATIENT IS WANTING TO KNOW IF SHE SHOULD GET BACK ON THE PRAVASTATIN BASED OFF OF HER MOST RECENT LAB RESULTS. SHE VIEWED THESE IN MYCHART AND SAID IT SEEMS LIKE SHE SHOULD. IF NOT, WHAT DOES CARLITA NOLASCO ADVISE FOR THE PATIENT? PLEASE CALL TO LET HER KNOW.       "

## 2023-03-17 NOTE — TELEPHONE ENCOUNTER
Her cholesterol looks fine- is similar to last year when she was taking the medication. She can continue to not take it for now.    Her white count is still mildly elevated but continues to trend down. This could be related to her smoking again. If she stops smoking, I would like to recheck it.    Everything else looks fine.

## 2023-04-26 ENCOUNTER — APPOINTMENT (OUTPATIENT)
Dept: BONE DENSITY | Facility: HOSPITAL | Age: 63
End: 2023-04-26
Payer: COMMERCIAL

## 2023-04-26 ENCOUNTER — HOSPITAL ENCOUNTER (OUTPATIENT)
Dept: MAMMOGRAPHY | Facility: HOSPITAL | Age: 63
Discharge: HOME | End: 2023-04-26
Admitting: PHYSICIAN ASSISTANT
Payer: COMMERCIAL

## 2023-04-26 DIAGNOSIS — Z12.31 ENCOUNTER FOR SCREENING MAMMOGRAM FOR MALIGNANT NEOPLASM OF BREAST: ICD-10-CM

## 2023-04-26 PROCEDURE — 77067 SCR MAMMO BI INCL CAD: CPT

## 2023-04-26 PROCEDURE — 77063 BREAST TOMOSYNTHESIS BI: CPT

## 2023-05-08 ENCOUNTER — LAB (OUTPATIENT)
Dept: LAB | Facility: HOSPITAL | Age: 63
End: 2023-05-08
Payer: COMMERCIAL

## 2023-05-08 ENCOUNTER — TRANSCRIBE ORDERS (OUTPATIENT)
Dept: LAB | Facility: HOSPITAL | Age: 63
End: 2023-05-08
Payer: COMMERCIAL

## 2023-05-08 DIAGNOSIS — Z79.899 ENCOUNTER FOR LONG-TERM (CURRENT) USE OF OTHER MEDICATIONS: ICD-10-CM

## 2023-05-08 DIAGNOSIS — L40.4 GUTTATE PSORIASIS: Primary | ICD-10-CM

## 2023-05-08 DIAGNOSIS — L40.50 PSORIATIC ARTHROPATHY: ICD-10-CM

## 2023-05-08 DIAGNOSIS — L40.4 GUTTATE PSORIASIS: ICD-10-CM

## 2023-05-08 LAB
BASOPHILS # BLD AUTO: 0.06 10*3/MM3 (ref 0–0.2)
BASOPHILS NFR BLD AUTO: 0.6 % (ref 0–1.5)
DEPRECATED RDW RBC AUTO: 45 FL (ref 37–54)
EOSINOPHIL # BLD AUTO: 0.17 10*3/MM3 (ref 0–0.4)
EOSINOPHIL NFR BLD AUTO: 1.6 % (ref 0.3–6.2)
ERYTHROCYTE [DISTWIDTH] IN BLOOD BY AUTOMATED COUNT: 13 % (ref 12.3–15.4)
HCT VFR BLD AUTO: 43.3 % (ref 34–46.6)
HGB BLD-MCNC: 15 G/DL (ref 12–15.9)
IMM GRANULOCYTES # BLD AUTO: 0.05 10*3/MM3 (ref 0–0.05)
IMM GRANULOCYTES NFR BLD AUTO: 0.5 % (ref 0–0.5)
LYMPHOCYTES # BLD AUTO: 3.65 10*3/MM3 (ref 0.7–3.1)
LYMPHOCYTES NFR BLD AUTO: 35.2 % (ref 19.6–45.3)
MCH RBC QN AUTO: 33 PG (ref 26.6–33)
MCHC RBC AUTO-ENTMCNC: 34.6 G/DL (ref 31.5–35.7)
MCV RBC AUTO: 95.4 FL (ref 79–97)
MONOCYTES # BLD AUTO: 0.73 10*3/MM3 (ref 0.1–0.9)
MONOCYTES NFR BLD AUTO: 7 % (ref 5–12)
NEUTROPHILS NFR BLD AUTO: 5.72 10*3/MM3 (ref 1.7–7)
NEUTROPHILS NFR BLD AUTO: 55.1 % (ref 42.7–76)
NRBC BLD AUTO-RTO: 0 /100 WBC (ref 0–0.2)
PLATELET # BLD AUTO: 241 10*3/MM3 (ref 140–450)
PMV BLD AUTO: 8.6 FL (ref 6–12)
RBC # BLD AUTO: 4.54 10*6/MM3 (ref 3.77–5.28)
WBC NRBC COR # BLD: 10.38 10*3/MM3 (ref 3.4–10.8)

## 2023-05-08 PROCEDURE — 36415 COLL VENOUS BLD VENIPUNCTURE: CPT

## 2023-05-08 PROCEDURE — 85025 COMPLETE CBC W/AUTO DIFF WBC: CPT

## 2023-05-08 PROCEDURE — 86480 TB TEST CELL IMMUN MEASURE: CPT

## 2023-05-08 PROCEDURE — 80053 COMPREHEN METABOLIC PANEL: CPT

## 2023-05-08 PROCEDURE — 80074 ACUTE HEPATITIS PANEL: CPT

## 2023-05-08 PROCEDURE — G0432 EIA HIV-1/HIV-2 SCREEN: HCPCS

## 2023-05-09 LAB
ALBUMIN SERPL-MCNC: 4.3 G/DL (ref 3.5–5.2)
ALBUMIN/GLOB SERPL: 1.4 G/DL
ALP SERPL-CCNC: 72 U/L (ref 39–117)
ALT SERPL W P-5'-P-CCNC: 11 U/L (ref 1–33)
ANION GAP SERPL CALCULATED.3IONS-SCNC: 11.4 MMOL/L (ref 5–15)
AST SERPL-CCNC: 21 U/L (ref 1–32)
BILIRUB SERPL-MCNC: 0.4 MG/DL (ref 0–1.2)
BUN SERPL-MCNC: 11 MG/DL (ref 8–23)
BUN/CREAT SERPL: 20 (ref 7–25)
CALCIUM SPEC-SCNC: 9.6 MG/DL (ref 8.6–10.5)
CHLORIDE SERPL-SCNC: 103 MMOL/L (ref 98–107)
CO2 SERPL-SCNC: 23.6 MMOL/L (ref 22–29)
CREAT SERPL-MCNC: 0.55 MG/DL (ref 0.57–1)
EGFRCR SERPLBLD CKD-EPI 2021: 104.4 ML/MIN/1.73
GLOBULIN UR ELPH-MCNC: 3.1 GM/DL
GLUCOSE SERPL-MCNC: 89 MG/DL (ref 65–99)
HAV IGM SERPL QL IA: NORMAL
HBV CORE IGM SERPL QL IA: NORMAL
HBV SURFACE AG SERPL QL IA: NORMAL
HCV AB SER DONR QL: NORMAL
HIV1+2 AB SER QL: NORMAL
POTASSIUM SERPL-SCNC: 3.8 MMOL/L (ref 3.5–5.2)
PROT SERPL-MCNC: 7.4 G/DL (ref 6–8.5)
SODIUM SERPL-SCNC: 138 MMOL/L (ref 136–145)

## 2023-05-10 LAB
GAMMA INTERFERON BACKGROUND BLD IA-ACNC: 0 IU/ML
M TB IFN-G BLD-IMP: NEGATIVE
M TB IFN-G CD4+ T-CELLS BLD-ACNC: 0.04 IU/ML
M TBIFN-G CD4+ CD8+T-CELLS BLD-ACNC: 0.02 IU/ML
MITOGEN IGNF BLD-ACNC: >10 IU/ML
QUANTIFERON INCUBATION: NORMAL
SERVICE CMNT-IMP: NORMAL

## 2023-05-12 ENCOUNTER — SPECIALTY PHARMACY (OUTPATIENT)
Dept: PHARMACY | Facility: TELEHEALTH | Age: 63
End: 2023-05-12
Payer: COMMERCIAL

## 2023-05-18 ENCOUNTER — SPECIALTY PHARMACY (OUTPATIENT)
Dept: PHARMACY | Facility: TELEHEALTH | Age: 63
End: 2023-05-18
Payer: COMMERCIAL

## 2023-05-18 NOTE — PROGRESS NOTES
Specialty Pharmacy Patient Management Program  Initial Assessment     Sherin Azul is a 63 y.o. female with psoriasis and enrolled in the Patient Management program offered by [facility]. An initial outreach was conducted, including assessment of therapy appropriateness and specialty medication education for Skyrizi 150mg/ml. The patient was introduced to services offered by [facility], including: regular assessments, refill coordination, curbside pick-up or mail order delivery options, prior authorization maintenance, and financial assistance programs as applicable. The patient was also provided with contact information for the pharmacy team.     Insurance Coverage & Financial Support  Covered under [employer] by prior authorization plus copay assistance provided by Skyrizi     Relevant Past Medical History and Comorbidities  Relevant medical history and concomitant health conditions were discussed with the patient. The patient's chart has been reviewed for relevant past medical history and comorbid health conditions and updated as necessary.   Past Medical History:   Diagnosis Date   • Acrochordon    • Acute maxillary sinusitis    • Acute otitis media    • Breast cancer 2005    left   • Dermatitis    • Drug therapy 2005   • Eczema    • H/O alcohol abuse    • Herpes    • History of breast cancer    • History of cervical dysplasia    • History of MRSA infection    • Hx of radiation therapy 2005   • Joint pain, hip    • Joint pain, knee    • Osteoarthritis    • Psoriasis    • Scabies    • Tendonitis    • Varicose veins of legs    • Viral syndrome    • Weight gain      Social History     Socioeconomic History   • Marital status:    Tobacco Use   • Smoking status: Every Day     Packs/day: 1.00     Types: Cigarettes     Start date: 2020   • Smokeless tobacco: Never   Vaping Use   • Vaping Use: Never used   Substance and Sexual Activity   • Alcohol use:  No   • Drug use: No   • Sexual activity: Not Currently       Allergies  Known allergies and reactions were discussed with the patient. The patient's chart has been reviewed for allergy information and updated as necessary.   Meloxicam    Current Medication List  This medication list has been reviewed with the patient and evaluated for any interactions or necessary modifications/recommendations, and updated to include all prescription medications, OTC medications, and supplements the patient is currently taking. This list reflects what is contained in the patient's profile, which has also been marked as reviewed to communicate to other providers it is the most up to date version of the patient's current medication therapy.     Current Outpatient Medications:   •  alclometasone (ACLOVATE) 0.05 % cream, Apply to the ears and affected areas in small amounts at bedtime as needed for no longer than 3-4 days at a time., Disp: 60 g, Rfl: 2  •  clobetasol (TEMOVATE) 0.05 % external solution, Apply and gently massage onto affected area(s) as directed once Daily., Disp: 50 mL, Rfl: 1  •  clobetasol (TEMOVATE) 0.05 % ointment, Apply a small amount topically to the appropriate area as directed 2 (Two) Times a Day for psoriasis., Disp: 60 g, Rfl: 2  •  clobetasol (TEMOVATE) 0.05 % ointment, Apply 1 application topically to the appropriate area as directed 2 (Two) Times a Day for two weeks. avoid face, groin, armpits. after two weeks take a break for 1 to 2 weeks then repeat until clear., Disp: 60 g, Rfl: 3  •  clobetasol propionate (CLOBEX) 0.05 % shampoo, Apply 1 application topically onto wet scalp. Leave lather on for 3 minutes, then rinse. Repeat the application once. Use as directed 2 (Two) Times a Week., Disp: 118 mL, Rfl: 0  •  HYDROcodone-acetaminophen (NORCO) 5-325 MG per tablet, , Disp: , Rfl:   •  omeprazole OTC (PrilOSEC OTC) 20 MG EC tablet, Prilosec 20 mg capsule,delayed release  Every night at bedtime, Disp: , Rfl:    •  pravastatin (PRAVACHOL) 10 MG tablet, Take 1 tablet by mouth Daily., Disp: 90 tablet, Rfl: 0  •  predniSONE (DELTASONE) 10 MG tablet, Take 1 tablet by mouth 2 (Two) Times a Day., Disp: 60 tablet, Rfl: 1  •  Risankizumab-rzaa (Skyrizi Pen) 150 MG/ML solution auto-injector, Inject 150mg under the skin at week 0 and 4, then every 12 weeks thereafter, Disp: 1 mL, Rfl: 1  •  Risankizumab-rzaa (Skyrizi Pen) 150 MG/ML solution auto-injector, inject 1 pen (150mg) under the skin as directed every 12 weeks, Disp: 1 mL, Rfl: 1  •  triamcinolone (KENALOG) 0.1 % cream, Apply 1 application topically to the appropriate area as directed 2 (Two) Times a Day., Disp: 80 g, Rfl: 1    Drug Interactions  none     Relevant Laboratory Values  Lab Results   Component Value Date    GLUCOSE 89 05/08/2023    CALCIUM 9.6 05/08/2023     05/08/2023    K 3.8 05/08/2023    CO2 23.6 05/08/2023     05/08/2023    BUN 11 05/08/2023    CREATININE 0.55 (L) 05/08/2023    BCR 20.0 05/08/2023    ANIONGAP 11.4 05/08/2023     Lab Results   Component Value Date    WBC 10.38 05/08/2023    HGB 15.0 05/08/2023    HCT 43.3 05/08/2023    MCV 95.4 05/08/2023     05/08/2023       Initial Education Provided for Specialty Medication  The patient has been provided with the following education and any applicable administration techniques (i.e. self-injection) have been demonstrated for the therapies indicated. All questions and concerns have been addressed prior to the patient receiving the medication, and the patient has verbalized understanding of the education and any materials provided. Additional patient education shall be provided and documented upon request by the patient, provider or payer.         Skyrizi (Risankizumab)         Medication Expectations   Why am I taking this medication? You are taking this medication for either moderate to severe plaque psoriasis or psoriatic arthritis.   What should I expect while on this medication?  After 24 weeks of taking skyrizi, plaque psoriasis patients reported skin to be 90% clearer. For arthritis patients, a majority of patients noticed improvement and significant relief in fingers and joints.   How does the medication work? Risankizumab-rzaa is a humanized immunoglobulin G1 (IgG1) monoclonal antibody that selectively binds to the p19 subunit of human interleukin 23 (IL-23) cytokine and inhibits its interaction with the IL-23 receptor. IL-23 is a naturally occurring cytokine that is involved in inflammatory and immune responses. Risankizumab-rzaa inhibits the release of pro-inflammatory cytokines and chemokines.   How long will I be on this medication for? The amount of time you will be on this medication will be determined by your doctor and your response to the medication.    How do I take this medication? Take as directed on your prescription label. There is an initial loading dose at week 0 and 4, then routinely every 12 weeks therafter. Skyrizi is a subcutaneous injection. It may be administered in the stomach, upper thigh, or upper outer arm. Allow to sit out of the refrigerator for 30 to 90 minutes for the pen and 15 to 30 minutes for the syringe before injecting.   What are some possible side effects? Injection site reactions or hypersensitivity reactions, headache, tiredness, upper respiratory tract injection, or fungal skin injection.   What happens if I miss a dose? If you miss a dose, take it as soon as you remember.            Medication Safety   What are things I should warn my doctor immediately about? Allergic reaction such has hives or trouble breathing. If you develop symptoms of infection, such as a cough or fever, that do not go away, weight loss, changes in how often you urinate, changes in sweating, muscle pain or weakness, or severe dizziness.     What are things that I should be cautious of? Injection site reaction, headache, and fatigue. You may have more chance of getting an  infection. Wash your hands often and stay away from people with infections, colds, or flu.   What are some medications that can interact with this one? Immunosuppressants and vaccines            Medication Storage/Handling   How should I handle this medication? Keep this medicine out of reach of pets and children. Keep the product in the original carton to protect from light until time of use. Do not shake or freeze.  Do not use if: solution contains large particles or is cloudy or discolored; solution has been frozen; prefilled pen or syringe has been dropped or damaged; carton perforations are broken.  Do not inject where the skin is tender, bruised, red, hard, or affected by psoriasis. Rotate injection sites.   How does this medication need to be stored? Store in refrigerator and keep dry. If needed, you may store at room temperature for up to 24 hours but do not return to the refrigerator if unused.    How should I dispose of this medication? You can dispose of the empty syringe in a sharps container, and if this is not available you may use an empty hard plastic container such as a milk jug or detergent container. Discard any unused portion or if stored outside of refrigerator > 24 hours.            Resources/Support   How can I remind myself to take this medication? You can download a reminder ascencion on your phone or use a calandar to help remember your next injection.   Is financial support available?  Yes, [employer] provides co-pay cards if you have commercial insurance or [employer] patient assistance if you have Medicare or no insurance.    Which vaccines are recommended for me? Talk to your doctor about these vaccines: Flu, Coronavirus (COVID-19), Pneumococcal (pneumonia), Tdap, Hepatitis B, Zoster (shingles)                 Adherence and Self-Administration  • Barriers to Patient Adherence and/or Self-Administration: none   • Methods for Supporting Patient Adherence and/or Self-Administration: N/A      Goals of Therapy   Goals     • Specialty Pharmacy General Goal      A reduction in BSA of skin lesions on the body.              Reassessment Plan & Follow-Up  1. Medication Therapy Changes: Patient has failed Humira.  Patient is stating loading dose of skyrizi today.  2. Additional Plans, Therapy Recommendations, or Therapy Problems to Be Addressed: none   3. Pharmacist to perform regular reassessments no more than (6) months from the previous assessment.  4. Welcome information and patient satisfaction survey to be sent by retail team with patient's initial fill.  5. Care Coordinator to set up future refill outreaches, coordinate prescription delivery, and escalate clinical questions to pharmacist.     Attestation  I attest that the initiated specialty medication(s) are appropriate for the patient based on my assessment. If the prescribed therapy is at any point deemed not appropriate based on the current or future assessments, a consultation will be initiated with the patient's specialty care provider to determine the best course of action. The revised plan of therapy will be documented along with any additional patient education provided.     Electronically signed by Alejandro Gant RPH, 05/18/23, 11:56 AM EDT.

## 2023-06-12 ENCOUNTER — SPECIALTY PHARMACY (OUTPATIENT)
Dept: PHARMACY | Facility: TELEHEALTH | Age: 63
End: 2023-06-12
Payer: COMMERCIAL

## 2023-06-12 NOTE — PROGRESS NOTES
"Specialty Pharmacy Refill Coordination Note     Sherin \"Amparo\" is a 63 y.o. female contacted today regarding refills of  Skyrizi specialty medication(s).    Reviewed and verified with patient:  Allergies  Meds       Specialty medication(s) and dose(s) confirmed: yes    Refill Questions      Flowsheet Row Most Recent Value   Changes to allergies? No   Changes to medications? Yes   [ABX for tooth infection]   New conditions since last clinic visit No   Unplanned office visit, urgent care, ED, or hospital admission in the last 4 weeks  No   How does patient/caregiver feel medication is working? Very good   Financial problems or insurance changes  No   Since the previous refill, were any specialty medication doses or scheduled injections missed or delayed?  No   Does this patient require a clinical escalation to a pharmacist? Yes                   Medication Adherence    Adherence tools used: directed education  Support network for adherence: family member          Follow-up: 23 day(s)     Silvano Hinton, Pharmacy Technician  Specialty Pharmacy Technician        "

## 2023-08-31 ENCOUNTER — SPECIALTY PHARMACY (OUTPATIENT)
Dept: PHARMACY | Facility: TELEHEALTH | Age: 63
End: 2023-08-31
Payer: COMMERCIAL

## 2023-08-31 NOTE — PROGRESS NOTES
"Specialty Pharmacy Refill Coordination Note     Sherin \"Amparo\" is a 63 y.o. female contacted today regarding refills of  Skyrizi specialty medication(s).    Reviewed and verified with patient:  Allergies  Meds       Specialty medication(s) and dose(s) confirmed: yes    Refill Questions      Flowsheet Row Most Recent Value   Changes to allergies? No   Changes to medications? No   New conditions since last clinic visit No   Unplanned office visit, urgent care, ED, or hospital admission in the last 4 weeks  No   How does patient/caregiver feel medication is working? Very good   Financial problems or insurance changes  No   Since the previous refill, were any specialty medication doses or scheduled injections missed or delayed?  No   Does this patient require a clinical escalation to a pharmacist? No                  Medication Adherence    Adherence tools used: directed education  Support network for adherence: family member          Follow-up: 23 day(s)     Silvano Hinton, Pharmacy Technician  Specialty Pharmacy Technician        "

## 2023-11-06 ENCOUNTER — SPECIALTY PHARMACY (OUTPATIENT)
Dept: PHARMACY | Facility: TELEHEALTH | Age: 63
End: 2023-11-06
Payer: COMMERCIAL

## 2023-11-06 NOTE — PROGRESS NOTES
Specialty Pharmacy Patient Management Program  Reassessment     Sherin Azul is a 63 y.o. female with psoriasis and enrolled in the Patient Management program offered by [facility]. A follow-up outreach was conducted, including assessment of continued therapy appropriateness, medication adherence, and side effect incidence and management for Skyrizi 150mg/ml.     Changes to Insurance Coverage or Financial Support  none    Relevant Past Medical History and Comorbidities  Relevant medical history and concomitant health conditions were discussed with the patient. The patient's chart has been reviewed for relevant past medical history and comorbid health conditions and updated as necessary.   Past Medical History:   Diagnosis Date    Acrochordon     Acute maxillary sinusitis     Acute otitis media     Breast cancer 2005    left    Dermatitis     Drug therapy 2005    Eczema     H/O alcohol abuse     Herpes     History of breast cancer     History of cervical dysplasia     History of MRSA infection     Hx of radiation therapy 2005    Joint pain, hip     Joint pain, knee     Osteoarthritis     Psoriasis     Scabies     Tendonitis     Varicose veins of legs     Viral syndrome     Weight gain      Social History     Socioeconomic History    Marital status:    Tobacco Use    Smoking status: Every Day     Packs/day: 1     Types: Cigarettes     Start date: 2020    Smokeless tobacco: Never   Vaping Use    Vaping Use: Never used   Substance and Sexual Activity    Alcohol use: No    Drug use: No    Sexual activity: Not Currently     Problem list reviewed by Alejandro Gant RPH on 11/6/2023 at 10:03 AM    Allergies  Known allergies and reactions were discussed with the patient. The patient's chart has been reviewed for allergy information and updated as necessary.   Allergies   Allergen Reactions    Meloxicam Nausea Only     Allergies reviewed by Alejandro Gant RPH on 11/6/2023 at  10:03 AM    Relevant Laboratory Values  Lab Results   Component Value Date    GLUCOSE 89 05/08/2023    CALCIUM 9.6 05/08/2023     05/08/2023    K 3.8 05/08/2023    CO2 23.6 05/08/2023     05/08/2023    BUN 11 05/08/2023    CREATININE 0.55 (L) 05/08/2023    BCR 20.0 05/08/2023    ANIONGAP 11.4 05/08/2023     Lab Results   Component Value Date    WBC 10.38 05/08/2023    HGB 15.0 05/08/2023    HCT 43.3 05/08/2023    MCV 95.4 05/08/2023     05/08/2023     Lab Value Review  The above lab values have been reviewed; the following specialty medication(s) dose adjustment(s) are recommended: none.    Current Medication List  This medication list has been reviewed with the patient and evaluated for any interactions or necessary modifications/recommendations, and updated to include all prescription medications, OTC medications, and supplements the patient is currently taking. This list reflects what is contained in the patient's profile, which has also been marked as reviewed to communicate to other providers it is the most up to date version of the patient's current medication therapy.     Current Outpatient Medications:     alclometasone (ACLOVATE) 0.05 % cream, Apply to the ears and affected areas in small amounts at bedtime as needed for no longer than 3-4 days at a time., Disp: 60 g, Rfl: 2    amoxicillin (AMOXIL) 500 MG capsule, Take 1 capsule by mouth 3 (Three) Times a Day., Disp: 21 capsule, Rfl: 0    clobetasol (TEMOVATE) 0.05 % external solution, Apply and gently massage onto affected area(s) as directed once Daily., Disp: 50 mL, Rfl: 1    clobetasol (TEMOVATE) 0.05 % ointment, Apply a small amount topically to the appropriate area as directed 2 (Two) Times a Day for psoriasis., Disp: 60 g, Rfl: 2    clobetasol (TEMOVATE) 0.05 % ointment, Apply 1 application topically to the appropriate area as directed 2 (Two) Times a Day for two weeks. avoid face, groin, armpits. after two weeks take a break for 1  to 2 weeks then repeat until clear., Disp: 60 g, Rfl: 3    clobetasol propionate (CLOBEX) 0.05 % shampoo, Apply 1 application topically onto wet scalp. Leave lather on for 3 minutes, then rinse. Repeat the application once. Use as directed 2 (Two) Times a Week., Disp: 118 mL, Rfl: 0    HYDROcodone-acetaminophen (NORCO) 5-325 MG per tablet, , Disp: , Rfl:     omeprazole OTC (PrilOSEC OTC) 20 MG EC tablet, Prilosec 20 mg capsule,delayed release  Every night at bedtime, Disp: , Rfl:     pravastatin (PRAVACHOL) 10 MG tablet, Take 1 tablet by mouth Daily., Disp: 90 tablet, Rfl: 0    predniSONE (DELTASONE) 10 MG tablet, Take 1 tablet by mouth 2 (Two) Times a Day., Disp: 60 tablet, Rfl: 1    Risankizumab-rzaa (Skyrizi Pen) 150 MG/ML solution auto-injector, Inject 150mg under the skin at week 0 and 4, then every 12 weeks thereafter, Disp: 1 mL, Rfl: 1    Risankizumab-rzaa (Skyrizi Pen) 150 MG/ML solution auto-injector, inject 1 pen (150mg) under the skin as directed every 12 weeks, Disp: 1 mL, Rfl: 1    Risankizumab-rzaa (Skyrizi Pen) 150 MG/ML solution auto-injector, Inject 150 mg under the skin into the appropriate area as directed Every 3 (Three) Months., Disp: 1 mL, Rfl: 1    triamcinolone (KENALOG) 0.1 % cream, Apply 1 application topically to the appropriate area as directed 2 (Two) Times a Day., Disp: 80 g, Rfl: 1  Medicines reviewed by Alejandro Gant RPH on 11/6/2023 at 10:03 AM    Drug Interactions  none     Adverse Drug Reactions  Adverse Reactions Experienced: none  Plan for ADR Management: N/A     Hospitalizations and Urgent Care Since Last Assessment  Hospitalizations or Admissions: none  ED Visits: none  Urgent Office Visits: none     Adherence and Self-Administration  Approximate Number of Doses Missed Since Last Assessment: none  Ongoing or New Barriers to Patient Adherence and/or Self-Administration: none   Methods for Supporting Patient Adherence and/or Self-Administration: N/A     Goals of  Therapy  Goals related to the patient's specialty therapy were discussed with the patient. The Patient Goals segment of this outreach has been reviewed and updated.   Goals Addressed Today        Specialty Pharmacy General Goal      A reduction in BSA of skin lesions on the body.                Progress Toward Meeting Patient-Identified Goals of Therapy: On Track  New Patient-Identified Goals, If Applicable:     Progress Toward Meeting Clinical Goals or Therapeutic Targets: On Track  New Clinical Goals or Therapeutic Targets, If Applicable:     Quality of Life Assessment   Quality of Life related to the patient's specialty therapy was discussed with the patient. The QOL segment of this outreach has been reviewed and updated.   Quality of Life Assessment  Quality of Life Improvement Scale: Moderately better  Comments on Quality of Life: tolerating well    Reassessment Plan & Follow-Up  Medication Therapy Changes: none  Additional Plans, Therapy Recommendations, or Therapy Problems to Be Addressed: none   Pharmacist to perform regular reassessments no more than (6) months from the previous assessment.  Care Coordinator to set up future refill outreaches, coordinate prescription delivery, and escalate clinical questions to pharmacist.     Attestation  I attest the patient was actively involved in and has agreed to the above plan of care. I attest that the specialty medication(s) addressed above are appropriate for the patient based on my reassessment. If the prescribed therapy is at any point deemed not appropriate based on the current or future assessments, a consultation will be initiated with the patient's specialty care provider to determine the best course of action. The revised plan of therapy will be documented along with any required assessments and/or additional patient education provided.     Electronically signed by Alejandro Gant RPH, 11/06/23, 10:03 AM EST.

## 2023-11-21 ENCOUNTER — SPECIALTY PHARMACY (OUTPATIENT)
Dept: PHARMACY | Facility: TELEHEALTH | Age: 63
End: 2023-11-21
Payer: COMMERCIAL

## 2023-11-21 NOTE — PROGRESS NOTES
"Specialty Pharmacy Refill Coordination Note     Sherin \"Amparo\" is a 63 y.o. female contacted today regarding refills of  Skyrizi specialty medication(s).    Reviewed and verified with patient:  Allergies  Meds       Specialty medication(s) and dose(s) confirmed: yes    Refill Questions      Flowsheet Row Most Recent Value   Changes to allergies? No   Changes to medications? No   New conditions since last clinic visit No   Unplanned office visit, urgent care, ED, or hospital admission in the last 4 weeks  No   How does patient/caregiver feel medication is working? Very good   Financial problems or insurance changes  No   Since the previous refill, were any specialty medication doses or scheduled injections missed or delayed?  No  [Next dose is on 12/04/23]   Does this patient require a clinical escalation to a pharmacist? No                  Medication Adherence          Adherence tools used: directed education      Support network for adherence: family member                Follow-up: 23 day(s)     Silvano Hinton, Pharmacy Technician  Specialty Pharmacy Technician        "

## 2024-02-19 ENCOUNTER — SPECIALTY PHARMACY (OUTPATIENT)
Dept: PHARMACY | Facility: TELEHEALTH | Age: 64
End: 2024-02-19
Payer: COMMERCIAL

## 2024-02-19 NOTE — PROGRESS NOTES
"Specialty Pharmacy Refill Coordination Note     Sherin \"Martine" is a 63 y.o. female contacted today regarding refills of  Skyrizi specialty medication(s).    Reviewed and verified with patient:  Allergies  Meds       Specialty medication(s) and dose(s) confirmed: yes    Refill Questions      Flowsheet Row Most Recent Value   Changes to allergies? No   Changes to medications? No   New conditions or infections since last clinic visit No   Unplanned office visit, urgent care, ED, or hospital admission in the last 4 weeks  No   How does patient/caregiver feel medication is working? Very good   Financial problems or insurance changes  No   Since the previous refill, were any specialty medication doses or scheduled injections missed or delayed?  No  [Next dose due 2/26/24]   Does this patient require a clinical escalation to a pharmacist? No            Delivery Questions      Flowsheet Row Most Recent Value   Delivery method  at Pharmacy   Delivery address verified with patient/caregiver? No  [ at Pharmacy]   Delivery address Other (Comment)  [ at Pharmacy]   Number of medications in delivery 1  [ at Pharmacy]   Medication(s) being filled and delivered Risankizumab-Rzaa (Antipsoriatics)   Doses left of specialty medications 0   Copay verified? No   Copay amount $5.00   Copay form of payment Pay at pickup              Medication Adherence          Adherence tools used: directed education      Support network for adherence: family member                Follow-up: 75 day(s)     Silvano Hinton, Pharmacy Technician  Specialty Pharmacy Technician        "

## 2024-03-04 ENCOUNTER — OFFICE VISIT (OUTPATIENT)
Dept: INTERNAL MEDICINE | Facility: CLINIC | Age: 64
End: 2024-03-04
Payer: COMMERCIAL

## 2024-03-04 ENCOUNTER — LAB (OUTPATIENT)
Dept: LAB | Facility: HOSPITAL | Age: 64
End: 2024-03-04
Payer: COMMERCIAL

## 2024-03-04 VITALS
WEIGHT: 144 LBS | BODY MASS INDEX: 23.99 KG/M2 | HEART RATE: 67 BPM | TEMPERATURE: 98.1 F | DIASTOLIC BLOOD PRESSURE: 70 MMHG | SYSTOLIC BLOOD PRESSURE: 100 MMHG | OXYGEN SATURATION: 99 % | HEIGHT: 65 IN

## 2024-03-04 DIAGNOSIS — E55.9 VITAMIN D DEFICIENCY: ICD-10-CM

## 2024-03-04 DIAGNOSIS — Z87.410 HISTORY OF CERVICAL DYSPLASIA: ICD-10-CM

## 2024-03-04 DIAGNOSIS — Z13.220 SCREENING, LIPID: ICD-10-CM

## 2024-03-04 DIAGNOSIS — Z85.3 HISTORY OF BREAST CANCER: ICD-10-CM

## 2024-03-04 DIAGNOSIS — Z00.00 WELLNESS EXAMINATION: Primary | ICD-10-CM

## 2024-03-04 DIAGNOSIS — Z13.21 SCREENING FOR ENDOCRINE, NUTRITIONAL, METABOLIC AND IMMUNITY DISORDER: ICD-10-CM

## 2024-03-04 DIAGNOSIS — F17.200 CURRENT EVERY DAY SMOKER: ICD-10-CM

## 2024-03-04 DIAGNOSIS — Z13.228 SCREENING FOR ENDOCRINE, NUTRITIONAL, METABOLIC AND IMMUNITY DISORDER: ICD-10-CM

## 2024-03-04 DIAGNOSIS — Z13.29 SCREENING FOR ENDOCRINE, NUTRITIONAL, METABOLIC AND IMMUNITY DISORDER: ICD-10-CM

## 2024-03-04 DIAGNOSIS — Z76.89 ENCOUNTER TO ESTABLISH CARE WITH NEW DOCTOR: ICD-10-CM

## 2024-03-04 DIAGNOSIS — K21.9 GASTROESOPHAGEAL REFLUX DISEASE, UNSPECIFIED WHETHER ESOPHAGITIS PRESENT: ICD-10-CM

## 2024-03-04 DIAGNOSIS — Z78.0 MENOPAUSE: ICD-10-CM

## 2024-03-04 DIAGNOSIS — Z12.31 ENCOUNTER FOR SCREENING MAMMOGRAM FOR MALIGNANT NEOPLASM OF BREAST: ICD-10-CM

## 2024-03-04 DIAGNOSIS — Z13.0 SCREENING FOR ENDOCRINE, NUTRITIONAL, METABOLIC AND IMMUNITY DISORDER: ICD-10-CM

## 2024-03-04 DIAGNOSIS — Z80.41 FAMILY HX OF OVARIAN MALIGNANCY: ICD-10-CM

## 2024-03-04 PROBLEM — N81.6 RECTOCELE: Status: RESOLVED | Noted: 2018-05-22 | Resolved: 2024-03-04

## 2024-03-04 PROBLEM — B86 INFESTATION BY SARCOPTES SCABIEI: Status: RESOLVED | Noted: 2019-07-11 | Resolved: 2024-03-04

## 2024-03-04 PROBLEM — M25.559 ARTHRALGIA OF HIP: Status: RESOLVED | Noted: 2019-07-11 | Resolved: 2024-03-04

## 2024-03-04 LAB
25(OH)D3 SERPL-MCNC: 88.8 NG/ML (ref 30–100)
ALBUMIN SERPL-MCNC: 4.6 G/DL (ref 3.5–5.2)
ALBUMIN/GLOB SERPL: 1.6 G/DL
ALP SERPL-CCNC: 70 U/L (ref 39–117)
ALT SERPL W P-5'-P-CCNC: 12 U/L (ref 1–33)
ANION GAP SERPL CALCULATED.3IONS-SCNC: 12.8 MMOL/L (ref 5–15)
AST SERPL-CCNC: 19 U/L (ref 1–32)
BILIRUB SERPL-MCNC: 0.4 MG/DL (ref 0–1.2)
BUN SERPL-MCNC: 8 MG/DL (ref 8–23)
BUN/CREAT SERPL: 11.9 (ref 7–25)
CALCIUM SPEC-SCNC: 9.6 MG/DL (ref 8.6–10.5)
CHLORIDE SERPL-SCNC: 103 MMOL/L (ref 98–107)
CHOLEST SERPL-MCNC: 197 MG/DL (ref 0–200)
CO2 SERPL-SCNC: 24.2 MMOL/L (ref 22–29)
CREAT SERPL-MCNC: 0.67 MG/DL (ref 0.57–1)
EGFRCR SERPLBLD CKD-EPI 2021: 99.6 ML/MIN/1.73
GLOBULIN UR ELPH-MCNC: 2.9 GM/DL
GLUCOSE SERPL-MCNC: 84 MG/DL (ref 65–99)
HDLC SERPL-MCNC: 54 MG/DL (ref 40–60)
LDLC SERPL CALC-MCNC: 125 MG/DL (ref 0–100)
LDLC/HDLC SERPL: 2.28 {RATIO}
POTASSIUM SERPL-SCNC: 4.2 MMOL/L (ref 3.5–5.2)
PROT SERPL-MCNC: 7.5 G/DL (ref 6–8.5)
SODIUM SERPL-SCNC: 140 MMOL/L (ref 136–145)
TRIGL SERPL-MCNC: 100 MG/DL (ref 0–150)
TSH SERPL DL<=0.05 MIU/L-ACNC: 0.9 UIU/ML (ref 0.27–4.2)
VLDLC SERPL-MCNC: 18 MG/DL (ref 5–40)

## 2024-03-04 PROCEDURE — 82306 VITAMIN D 25 HYDROXY: CPT

## 2024-03-04 PROCEDURE — 80050 GENERAL HEALTH PANEL: CPT

## 2024-03-04 PROCEDURE — 80061 LIPID PANEL: CPT

## 2024-03-04 RX ORDER — OMEPRAZOLE 40 MG/1
40 CAPSULE, DELAYED RELEASE ORAL DAILY
Qty: 90 CAPSULE | Refills: 3 | Status: SHIPPED | OUTPATIENT
Start: 2024-03-04

## 2024-03-04 NOTE — PROGRESS NOTES
Office Note     Name: Sherin Azul    : 1960     MRN: [id number]     Chief Complaint  Annual Exam    Subjective     History of Present Illness:  Sherin Azul is a 63 y.o. female who presents today for annual exam, establish care with new doctor - is currently fasting     Concerns for some chest discomfort- after eating. Choking sensation. Denies nausea or vomiting   Worse with solids - has to drink to help things move   Has noticed for about 2 months -  Denies any dietary changes  Has never had endoscopy  Can occasionally have pains in chest- fades after hours  Denies previous heart hx    PMH-  HPL - has not been taking cholesterol medication   GERD   Anal fissure  Rectocele   Menopause   Hx of cervical dysplasia   Hx of breast cancer with radiation   Hx of Alcohol abuse   Arthritis   Psorasis   Eczema   Insomnia   Tobacco use   Hx of MRSA   Herpes     Meds-  Alclometasone 0.05%   Clobetasol 0.05%   Clobetasol shampoo  Triamcinolone 0.1%  Hydrocodone 5/325mg 2-4 times per day   Prilosec 40mg daily   Pravastatin 10mg daily- not currently taking   Skirizi every 4 months - [facility]  Vitamin D supplement     Family Hx-   Maternal GM- - stroke   Maternal GF- - unsure   Paternal GM- - unsure   Paternal GF- - unsure   Mother- - breast cancer   Father- - stomach cancer   Sister- - ovarian cancer, breast cancer   Brother- - bladder cancer, bone cancer   Brother- - kidney cancer, bone cancer   Brother- alive- throat cancer, DM     Alcohol Quit- 7 years. 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 Follows with Dermatology regularly       Dental/Eye exams up to date. Vision is stable. Glasses  no contacts   Diet/Exercise-  Diet-Healthy moderate   Exercise- limited- on feet all day with work. Stays active with work     OBGYN   PAP- will schedule- needs to establish   Mammo - will schedule   DEXA- will schedule   BC/Hormone replacement - none   Vitamin D -  supplement daily     Colonoscopy/Cologuard - stable. Every 10 years. Next due 2031     Immunizations  Tdap- up to date   Flu- up to date   Shingles- will schedule   COVID- completed no boosters   PNA- will hold     Lung Screening-   Ordered today     Review of Systems:   Review of Systems   Constitutional:  Negative for chills and fever.   HENT:  Positive for trouble swallowing. Negative for dental problem and voice change.         Dysphagia- solids occasionally- possible GERD. Will monitor symptoms    Eyes:  Negative for visual disturbance.   Respiratory:  Negative for cough, chest tightness, shortness of breath and wheezing.    Cardiovascular:  Negative for chest pain, palpitations and leg swelling.   Gastrointestinal:  Positive for GERD and indigestion. Negative for abdominal pain, blood in stool, constipation, diarrhea, nausea and vomiting.   Genitourinary:  Negative for dysuria.   Musculoskeletal:  Negative for gait problem.   Skin:  Negative for rash.   Neurological:  Negative for dizziness, seizures, speech difficulty, weakness, light-headedness and headache.   Hematological:  Does not bruise/bleed easily.   Psychiatric/Behavioral:  Positive for stress. Negative for self-injury, sleep disturbance, suicidal ideas and depressed mood.        Past Medical History:   Past Medical History:   Diagnosis Date    Acrochordon     Acute maxillary sinusitis     Acute otitis media     Breast cancer 2005    left    Dermatitis     Drug therapy 2005    Eczema     H/O alcohol abuse     Herpes     History of breast cancer     History of cervical dysplasia     History of MRSA infection     Hx of radiation therapy 2005    Joint pain, hip     Joint pain, knee     Osteoarthritis     Psoriasis      Scabies     Tendonitis     Varicose veins of legs     Viral syndrome     Weight gain        Past Surgical History:   Past Surgical History:   Procedure Laterality Date    BREAST BIOPSY Left 2005    BREAST LUMPECTOMY Left 2005    lumpectomy    LAPAROSCOPIC ASSISTED VAGINAL HYSTERECTOMY SALPINGO OOPHORECTOMY Bilateral     OOPHORECTOMY         Immunizations:   Immunization History   Administered Date(s) Administered    COVID-19 (PFIZER) Purple Cap Monovalent 12/23/2020, 01/13/2021    Flu Vaccine Intradermal Quad 18-64YR 09/24/2019    Hepatitis B Adult/Adolescent IM 12/21/1998, 01/21/1999, 07/08/1999    Influenza, Unspecified 10/10/2022    Td (TDVAX) 12/21/1998    Tdap 02/14/2020        Medications:     Current Outpatient Medications:     alclometasone (ACLOVATE) 0.05 % cream, Apply to the ears and affected areas in small amounts at bedtime as needed for no longer than 3-4 days at a time., Disp: 60 g, Rfl: 2    clobetasol (TEMOVATE) 0.05 % external solution, Apply and gently massage onto affected area(s) as directed once Daily., Disp: 50 mL, Rfl: 1    clobetasol propionate (CLOBEX) 0.05 % shampoo, Apply 1 application topically onto wet scalp. Leave lather on for 3 minutes, then rinse. Repeat the application once. Use as directed 2 (Two) Times a Week., Disp: 118 mL, Rfl: 0    HYDROcodone-acetaminophen (NORCO) 5-325 MG per tablet, , Disp: , Rfl:     pravastatin (PRAVACHOL) 10 MG tablet, Take 1 tablet by mouth Daily., Disp: 90 tablet, Rfl: 0    Risankizumab-rzaa (Skyrizi Pen) 150 MG/ML solution auto-injector, Inject 150 mg under the skin into the appropriate area as directed Every 3 (Three) Months., Disp: 1 mL, Rfl: 1    triamcinolone (KENALOG) 0.1 % cream, Apply 1 application topically to the appropriate area as directed 2 (Two) Times a Day., Disp: 80 g, Rfl: 1    omeprazole (priLOSEC) 40 MG capsule, Take 1 capsule by mouth Daily as directed for GERD, Disp: 90 capsule, Rfl: 3    Allergies:   Allergies   Allergen Reactions  "   Meloxicam Nausea Only       Family History:   Family History   Problem Relation Age of Onset    Breast cancer Mother 40    Stomach cancer Father     Breast cancer Sister 43    Ovarian cancer Sister 40    Bone cancer Brother     Cancer Brother         bladder    Bone cancer Brother     Cancer Brother         kidney    Throat cancer Brother     Diabetes Brother     Stroke Maternal Grandmother     Breast cancer Maternal Aunt 45       Social History:   Social History     Socioeconomic History    Marital status:    Tobacco Use    Smoking status: Every Day     Current packs/day: 1.00     Average packs/day: 1 pack/day for 49.2 years (49.2 ttl pk-yrs)     Types: Cigarettes     Start date: 1975     Passive exposure: Current    Smokeless tobacco: Never   Vaping Use    Vaping status: Never Used   Substance and Sexual Activity    Alcohol use: No    Drug use: No    Sexual activity: Not Currently         Objective     Vital Signs  /70   Pulse 67   Temp 98.1 °F (36.7 °C) (Infrared)   Ht 165.1 cm (65\")   Wt 65.3 kg (144 lb)   SpO2 99%   BMI 23.96 kg/m²   Estimated body mass index is 23.96 kg/m² as calculated from the following:    Height as of this encounter: 165.1 cm (65\").    Weight as of this encounter: 65.3 kg (144 lb).    BMI is within normal parameters. No other follow-up for BMI required.      Physical Exam  Vitals and nursing note reviewed.   Constitutional:       General: She is not in acute distress.     Appearance: Normal appearance.   HENT:      Head: Normocephalic and atraumatic.      Right Ear: Tympanic membrane normal.      Left Ear: Tympanic membrane normal.      Nose: Nose normal.      Mouth/Throat:      Mouth: Mucous membranes are moist.   Eyes:      Extraocular Movements: Extraocular movements intact.      Conjunctiva/sclera: Conjunctivae normal.      Pupils: Pupils are equal, round, and reactive to light.   Cardiovascular:      Rate and Rhythm: Normal rate and regular rhythm.      Heart " sounds: Normal heart sounds.   Pulmonary:      Effort: Pulmonary effort is normal. No respiratory distress.      Breath sounds: Normal breath sounds.   Abdominal:      General: Bowel sounds are normal.      Palpations: Abdomen is soft.   Musculoskeletal:         General: Normal range of motion.      Cervical back: Normal range of motion.      Comments: Moving all extremities    Skin:     General: Skin is warm and dry.   Neurological:      General: No focal deficit present.      Mental Status: She is alert and oriented to person, place, and time.   Psychiatric:         Mood and Affect: Mood normal.         Behavior: Behavior normal.         Thought Content: Thought content normal.         Judgment: Judgment normal.          Procedures     Assessment and Plan   Diagnosis Discussed   Continue to monitor   Plenty of fluids, monitor diet and exercise   Labs ordered will notify of results   Immunizations up to date - will schedule shingles vaccine at later date   Follow up with Pain Medicine   Follow up with Dermatology   Follow up with GYN- need for PAP given history  DEXA and Mammogram ordered today   Low dose CT lung screen ordered- will schedule   Take medications as instructed- Prilosec 40mg daily- if no improvement will need to follow with GI for further evaluation- please Mychart as directed   Follow up as directed   If symptoms worsen or persist please seek further evaluation     1. Wellness examination    2. Encounter to establish care with new doctor  Reviewed PMH, Medications and specialists     3. Screening for endocrine, nutritional, metabolic and immunity disorder  - CBC (No Diff); Future  - Comprehensive Metabolic Panel; Future  - TSH Rfx On Abnormal To Free T4; Future    4. Screening, lipid  - Lipid Panel; Future    5. Gastroesophageal reflux disease, unspecified whether esophagitis present  - omeprazole (priLOSEC) 40 MG capsule; Take 1 capsule by mouth Daily as directed for GERD  Dispense: 90 capsule;  Refill: 3  - increased dose of prilosec   Will monitor symptoms   Concerns for dysphagia for solids and pain comes and goes- may need endoscopy for further evaluation    6. Family hx of ovarian malignancy  - Ambulatory Referral to Gynecology    7. History of breast cancer  - Ambulatory Referral to Gynecology  - Mammo diagnostic digital tomosynthesis bilateral w CAD; Future    8. History of cervical dysplasia  - Ambulatory Referral to Gynecology  Need for PAP given history   Hx of abnormal will have patient follow with GYN     9. Encounter for screening mammogram for malignant neoplasm of breast  - Mammo diagnostic digital tomosynthesis bilateral w CAD; Future  Hx of Breast CA     10. Menopause  - DEXA Bone Density Axial; Future  - Vitamin D,25-Hydroxy; Future    11. Current every day smoker  -  CT Chest Low Dose Cancer Screening WO; Future  - Vitamin D,25-Hydroxy; Future    12. Vitamin D deficiency  - Vitamin D,25-Hydroxy; Future   - continue supplement daily       Follow Up  Return in about 1 year (around 3/4/2025), or if symptoms worsen or fail to improve, for Annual, fasting labs.    Saira Thompson MD  [facility] INTERNAL MEDICINE  [address] [place] [id number] [phone]

## 2024-03-04 NOTE — PATIENT INSTRUCTIONS
Diagnosis Discussed   Continue to monitor   Plenty of fluids, monitor diet and exercise   Labs ordered will notify of results   Immunizations up to date - will schedule shingles vaccine at later date   Follow up with Pain Medicine   Follow up with Dermatology   Follow up with GYN- need for PAP given history  DEXA and Mammogram ordered today   Low dose CT lung screen ordered- will schedule   Take medications as instructed- Prilosec 40mg daily- if no improvement will need to follow with GI for further evaluation- please Mychart as directed   Follow up as directed   If symptoms worsen or persist please seek further evaluation

## 2024-03-05 LAB
DEPRECATED RDW RBC AUTO: 43.2 FL (ref 37–54)
ERYTHROCYTE [DISTWIDTH] IN BLOOD BY AUTOMATED COUNT: 12.7 % (ref 12.3–15.4)
HCT VFR BLD AUTO: 44.3 % (ref 34–46.6)
HGB BLD-MCNC: 15.4 G/DL (ref 12–15.9)
MCH RBC QN AUTO: 32.6 PG (ref 26.6–33)
MCHC RBC AUTO-ENTMCNC: 34.8 G/DL (ref 31.5–35.7)
MCV RBC AUTO: 93.9 FL (ref 79–97)
PLATELET # BLD AUTO: 258 10*3/MM3 (ref 140–450)
PMV BLD AUTO: 8.8 FL (ref 6–12)
RBC # BLD AUTO: 4.72 10*6/MM3 (ref 3.77–5.28)
WBC NRBC COR # BLD AUTO: 8.74 10*3/MM3 (ref 3.4–10.8)

## 2024-03-16 ENCOUNTER — HOSPITAL ENCOUNTER (OUTPATIENT)
Dept: CT IMAGING | Facility: HOSPITAL | Age: 64
Discharge: HOME | End: 2024-03-16
Admitting: FAMILY MEDICINE
Payer: COMMERCIAL

## 2024-03-16 DIAGNOSIS — F17.200 CURRENT EVERY DAY SMOKER: ICD-10-CM

## 2024-03-16 PROCEDURE — 71271 CT THORAX LUNG CANCER SCR C-: CPT

## 2024-03-24 PROCEDURE — 87086 URINE CULTURE/COLONY COUNT: CPT | Performed by: FAMILY MEDICINE

## 2024-03-24 PROCEDURE — 87186 SC STD MICRODIL/AGAR DIL: CPT | Performed by: FAMILY MEDICINE

## 2024-03-24 PROCEDURE — 87077 CULTURE AEROBIC IDENTIFY: CPT | Performed by: FAMILY MEDICINE

## 2024-04-19 ENCOUNTER — HOSPITAL ENCOUNTER (OUTPATIENT)
Dept: BONE DENSITY | Facility: HOSPITAL | Age: 64
Discharge: HOME | End: 2024-04-19
Payer: COMMERCIAL

## 2024-04-19 ENCOUNTER — HOSPITAL ENCOUNTER (OUTPATIENT)
Dept: MAMMOGRAPHY | Facility: HOSPITAL | Age: 64
Discharge: HOME | End: 2024-04-19
Payer: COMMERCIAL

## 2024-04-19 DIAGNOSIS — Z78.0 MENOPAUSE: ICD-10-CM

## 2024-04-19 DIAGNOSIS — Z12.31 ENCOUNTER FOR SCREENING MAMMOGRAM FOR MALIGNANT NEOPLASM OF BREAST: ICD-10-CM

## 2024-04-19 DIAGNOSIS — Z85.3 HISTORY OF BREAST CANCER: ICD-10-CM

## 2024-04-19 PROCEDURE — 77080 DXA BONE DENSITY AXIAL: CPT

## 2024-04-19 PROCEDURE — 77067 SCR MAMMO BI INCL CAD: CPT

## 2024-04-19 PROCEDURE — 77063 BREAST TOMOSYNTHESIS BI: CPT

## 2024-04-29 ENCOUNTER — SPECIALTY PHARMACY (OUTPATIENT)
Dept: PHARMACY | Facility: TELEHEALTH | Age: 64
End: 2024-04-29
Payer: COMMERCIAL

## 2024-04-29 NOTE — PROGRESS NOTES
Specialty Pharmacy Patient Management Program  Reassessment     Sherin Azul is a 63 y.o. female with psoriasis and enrolled in the Patient Management program offered by [facility]. A follow-up outreach was conducted, including assessment of continued therapy appropriateness, medication adherence, and side effect incidence and management for Skyrizi 150mg/ml.     Changes to Insurance Coverage or Financial Support  none    Relevant Past Medical History and Comorbidities  Relevant medical history and concomitant health conditions were discussed with the patient. The patient's chart has been reviewed for relevant past medical history and comorbid health conditions and updated as necessary.   Past Medical History:   Diagnosis Date    Acrochordon     Acute maxillary sinusitis     Acute otitis media     Breast cancer 2005    left    Dermatitis     Drug therapy 2005    Eczema     H/O alcohol abuse     Herpes     History of breast cancer     History of cervical dysplasia     History of MRSA infection     Hx of radiation therapy 2005    Joint pain, hip     Joint pain, knee     Osteoarthritis     Psoriasis     Scabies     Tendonitis     Varicose veins of legs     Viral syndrome     Weight gain      Social History     Socioeconomic History    Marital status:    Tobacco Use    Smoking status: Every Day     Current packs/day: 1.00     Average packs/day: 1 pack/day for 49.3 years (49.3 ttl pk-yrs)     Types: Cigarettes     Start date: 1975     Passive exposure: Current    Smokeless tobacco: Never   Vaping Use    Vaping status: Never Used   Substance and Sexual Activity    Alcohol use: No    Drug use: No    Sexual activity: Not Currently     Problem list reviewed by Alejandro Gant RPH on 4/29/2024 at  9:10 AM    Allergies  Known allergies and reactions were discussed with the patient. The patient's chart has been reviewed for allergy information and updated as necessary.   Allergies    Allergen Reactions    Meloxicam Nausea Only     Allergies reviewed by Alejandro Gant RPH on 4/29/2024 at  9:10 AM    Relevant Laboratory Values  Lab Results   Component Value Date    GLUCOSE 84 03/04/2024    CALCIUM 9.6 03/04/2024     03/04/2024    K 4.2 03/04/2024    CO2 24.2 03/04/2024     03/04/2024    BUN 8 03/04/2024    CREATININE 0.67 03/04/2024    BCR 11.9 03/04/2024    ANIONGAP 12.8 03/04/2024     Lab Results   Component Value Date    WBC 8.74 03/04/2024    HGB 15.4 03/04/2024    HCT 44.3 03/04/2024    MCV 93.9 03/04/2024     03/04/2024     Lab Value Review  The above lab values have been reviewed; the following specialty medication(s) dose adjustment(s) are recommended: none.    Current Medication List  This medication list has been reviewed with the patient and evaluated for any interactions or necessary modifications/recommendations, and updated to include all prescription medications, OTC medications, and supplements the patient is currently taking. This list reflects what is contained in the patient's profile, which has also been marked as reviewed to communicate to other providers it is the most up to date version of the patient's current medication therapy.     Current Outpatient Medications:     alclometasone (ACLOVATE) 0.05 % cream, Apply to the ears and affected areas in small amounts at bedtime as needed for no longer than 3-4 days at a time., Disp: 60 g, Rfl: 2    clobetasol (TEMOVATE) 0.05 % external solution, Apply and gently massage onto affected area(s) as directed once Daily., Disp: 50 mL, Rfl: 1    clobetasol propionate (CLOBEX) 0.05 % shampoo, Apply 1 application topically onto wet scalp. Leave lather on for 3 minutes, then rinse. Repeat the application once. Use as directed 2 (Two) Times a Week., Disp: 118 mL, Rfl: 0    HYDROcodone-acetaminophen (NORCO) 7.5-325 MG per tablet, , Disp: , Rfl:     omeprazole (priLOSEC) 40 MG capsule, Take 1 capsule by mouth Daily as  directed for GERD, Disp: 90 capsule, Rfl: 3    pravastatin (PRAVACHOL) 10 MG tablet, Take 1 tablet by mouth Daily., Disp: 90 tablet, Rfl: 0    Risankizumab-rzaa (Skyrizi Pen) 150 MG/ML solution auto-injector, Inject 150 mg under the skin into the appropriate area as directed Every 3 (Three) Months., Disp: 1 mL, Rfl: 1    sulfamethoxazole-trimethoprim (BACTRIM DS,SEPTRA DS) 800-160 MG per tablet, Take 1 tablet by mouth 2 (Two) Times a Day., Disp: 20 tablet, Rfl: 0    triamcinolone (KENALOG) 0.1 % cream, Apply 1 application topically to the appropriate area as directed 2 (Two) Times a Day., Disp: 80 g, Rfl: 1  Medicines reviewed by Alejandro Gant [facility] on 4/29/2024 at  9:10 AM    Drug Interactions  none     Adverse Drug Reactions  Medication tolerability: Tolerating with no to minimal ADRs  Medication plan: Continue therapy with normal follow-up  Plan for ADR Management: none    Hospitalizations and Urgent Care Since Last Assessment  Hospitalizations or Admissions: none  ED Visits: none  Urgent Office Visits: none     Adherence, Self-Administration, and Current Therapy Problems  Adherence related to the patient's specialty therapy was discussed with the patient. The Adherence segment of this outreach has been reviewed and updated.     Adherence Questions  Linked Medication(s) Assessed: Risankizumab-Rzaa (Antipsoriatics)  On average, how many doses/injections does the patient miss per month?: 0  What are the identified reasons for non-adherence or missed doses? : no problems identified  What is the estimated medication adherence level?: %  Based on the patient/caregiver response and refill history, does this patient require an MTP to track adherence improvements?: no    Additional Barriers to Patient Self-Administration: none  Methods for Supporting Patient Self-Administration: none    Open Medication Therapy Problems  No medication therapy recommendations to display    Goals of Therapy  Goals related to the  patient's specialty therapy were discussed with the patient. The Patient Goals segment of this outreach has been reviewed and updated.   Goals Addressed Today        Specialty Pharmacy General Goal      A reduction in BSA of skin lesions on the body.                Progress Toward Meeting Patient-Identified Goals of Therapy: On Track  New Patient-Identified Goals, If Applicable:     Progress Toward Meeting Clinical Goals or Therapeutic Targets: On Track  New Clinical Goals or Therapeutic Targets, If Applicable:     Quality of Life Assessment   Quality of Life related to the patient's enrollment in the patient management program and services provided was discussed with the patient. The QOL segment of this outreach has been reviewed and updated.  Quality of Life Improvement Scale: 8-Moderately better    Reassessment Plan & Follow-Up  Medication Therapy Changes: none  Additional Plans, Therapy Recommendations, or Therapy Problems to Be Addressed: none   Pharmacist to perform regular reassessments no more than (6) months from the previous assessment.  Care Coordinator to set up future refill outreaches, coordinate prescription delivery, and escalate clinical questions to pharmacist.     Attestation  I attest the patient was actively involved in and has agreed to the above plan of care. I attest that the specialty medication(s) addressed above are appropriate for the patient based on my reassessment. If the prescribed therapy is at any point deemed not appropriate based on the current or future assessments, a consultation will be initiated with the patient's specialty care provider to determine the best course of action. The revised plan of therapy will be documented along with any required assessments and/or additional patient education provided.     Electronically signed by Alejandro Gant RPH, 04/29/24, 9:11 AM EDT.

## 2024-05-07 ENCOUNTER — SPECIALTY PHARMACY (OUTPATIENT)
Dept: PHARMACY | Facility: TELEHEALTH | Age: 64
End: 2024-05-07
Payer: COMMERCIAL

## 2024-05-13 ENCOUNTER — SPECIALTY PHARMACY (OUTPATIENT)
Dept: PHARMACY | Facility: TELEHEALTH | Age: 64
End: 2024-05-13
Payer: COMMERCIAL

## 2024-07-18 ENCOUNTER — OFFICE VISIT (OUTPATIENT)
Dept: OBSTETRICS AND GYNECOLOGY | Facility: CLINIC | Age: 64
End: 2024-07-18
Payer: COMMERCIAL

## 2024-07-18 VITALS
BODY MASS INDEX: 23.66 KG/M2 | HEIGHT: 65 IN | WEIGHT: 142 LBS | DIASTOLIC BLOOD PRESSURE: 72 MMHG | SYSTOLIC BLOOD PRESSURE: 118 MMHG

## 2024-07-18 DIAGNOSIS — Z85.3 HISTORY OF BREAST CANCER: ICD-10-CM

## 2024-07-18 DIAGNOSIS — Z01.419 WOMEN'S ANNUAL ROUTINE GYNECOLOGICAL EXAMINATION: Primary | ICD-10-CM

## 2024-07-18 NOTE — PROGRESS NOTES
Subjective     Chief Complaint   Patient presents with    Gynecologic Exam     New patient annual         Sherin Perla Azul is a 64 y.o. year old  presenting to be seen for her annual exam.      She is not sexually active.      She exercises regularly: no.  She wears her seat belt: yes.  She has concerns about domestic violence: no.  She has noticed changes in height: not asked    GYN screening history:  Last mammogram: she reports her last mammogram was normal  Last fasting lipid profile: she reports her last lipid panel was normal  Last colonoscopy: she reports her last colonoscopy was normal  Last DEXA: she reports her last DEXA was normal.    No Additional Complaints Reported  Health Maintenance for Postmenopausal Women  Menopause is a normal process in which your ability to get pregnant comes to an end. This process happens slowly over many months or years, usually between the ages of 48 and 55. Menopause is complete when you have missed your menstrual period for 12 months.  It is important to talk with your health care provider about some of the most common conditions that affect women after menopause (postmenopausal women). These include heart disease, cancer, and bone loss (osteoporosis). Adopting a healthy lifestyle and getting preventive care can help to promote your health and wellness. The actions you take can also lower your chances of developing some of these common conditions.  What are the signs and symptoms of menopause?  During menopause, you may have the following symptoms:  Hot flashes. These can be moderate or severe.  Night sweats.  Decrease in sex drive.  Mood swings.  Headaches.  Tiredness (fatigue).  Irritability.  Memory problems.  Problems falling asleep or staying asleep.  Talk with your health care provider about treatment options for your symptoms.  Do I need hormone replacement therapy?  Hormone replacement therapy is effective in treating symptoms that are caused by  menopause, such as hot flashes and night sweats.  Hormone replacement carries certain risks, especially as you become older. If you are thinking about using estrogen or estrogen with progestin, discuss the benefits and risks with your health care provider.  How can I reduce my risk for heart disease and stroke?  The risk of heart disease, heart attack, and stroke increases as you age. One of the causes may be a change in the body's hormones during menopause. This can affect how your body uses dietary fats, triglycerides, and cholesterol. Heart attack and stroke are medical emergencies. There are many things that you can do to help prevent heart disease and stroke.  Watch your blood pressure  High blood pressure causes heart disease and increases the risk of stroke. This is more likely to develop in people who have high blood pressure readings or are overweight.  Have your blood pressure checked:  Every 3-5 years if you are 18-39 years of age.  Every year if you are 40 years old or older.  Eat a healthy diet    Eat a diet that includes plenty of vegetables, fruits, low-fat dairy products, and lean protein.  Do not eat a lot of foods that are high in solid fats, added sugars, or sodium.  Get regular exercise  Get regular exercise. This is one of the most important things you can do for your health. Most adults should:  Try to exercise for at least 150 minutes each week. The exercise should increase your heart rate and make you sweat (moderate-intensity exercise).  Try to do strengthening exercises at least twice each week. Do these in addition to the moderate-intensity exercise.  Spend less time sitting. Even light physical activity can be beneficial.  Other tips  Work with your health care provider to achieve or maintain a healthy weight.  Do not use any products that contain nicotine or tobacco. These products include cigarettes, chewing tobacco, and vaping devices, such as e-cigarettes. If you need help quitting,  ask your health care provider.  Know your numbers. Ask your health care provider to check your cholesterol and your blood sugar (glucose). Continue to have your blood tested as directed by your health care provider.  Do I need screening for cancer?  Depending on your health history and family history, you may need to have cancer screenings at different stages of your life. This may include screening for:  Breast cancer.  Cervical cancer.  Lung cancer.  Colorectal cancer.  What is my risk for osteoporosis?  After menopause, you may be at increased risk for osteoporosis. Osteoporosis is a condition in which bone destruction happens more quickly than new bone creation. To help prevent osteoporosis or the bone fractures that can happen because of osteoporosis, you may take the following actions:  If you are 19-50 years old, get at least 1,000 mg of calcium and at least 600 international units (IU) of vitamin D per day.  If you are older than age 50 but younger than age 70, get at least 1,200 mg of calcium and at least 600 international units (IU) of vitamin D per day.  If you are older than age 70, get at least 1,200 mg of calcium and at least 800 international units (IU) of vitamin D per day.  Smoking and drinking excessive alcohol increase the risk of osteoporosis. Eat foods that are rich in calcium and vitamin D, and do weight-bearing exercises several times each week as directed by your health care provider.  How does menopause affect my mental health?  Depression may occur at any age, but it is more common as you become older. Common symptoms of depression include:  Feeling depressed.  Changes in sleep patterns.  Changes in appetite or eating patterns.  Feeling an overall lack of motivation or enjoyment of activities that you previously enjoyed.  Frequent crying spells.  Talk with your health care provider if you think that you are experiencing any of these symptoms.  General instructions  See your health care  "provider for regular wellness exams and vaccines. This may include:  Scheduling regular health, dental, and eye exams.  Getting and maintaining your vaccines. These include:  Influenza vaccine. Get this vaccine each year before the flu season begins.  Pneumonia vaccine.  Shingles vaccine.  Tetanus, diphtheria, and pertussis (Tdap) booster vaccine.  Your health care provider may also recommend other immunizations.  Tell your health care provider if you have ever been abused or do not feel safe at home.  Summary  Menopause is a normal process in which your ability to get pregnant comes to an end.  This condition causes hot flashes, night sweats, decreased interest in sex, mood swings, headaches, or lack of sleep.  Treatment for this condition may include hormone replacement therapy.  Take actions to keep yourself healthy, including exercising regularly, eating a healthy diet, watching your weight, and checking your blood pressure and blood sugar levels.  Get screened for cancer and depression. Make sure that you are up to date with all your vaccines.  The following portions of the patient's history were reviewed and updated as appropriate:vital signs, allergies, current medications, past medical history, past social history, past surgical history, and problem list.    Review of Systems  Pertinent items are noted in HPI.     Physical Exam    Objective     /72   Ht 165.1 cm (65\")   Wt 64.4 kg (142 lb)   Breastfeeding No   BMI 23.63 kg/m²     General:  well developed; well nourished  no acute distress   Constitutional: healthy   Skin:  No suspicious lesions seen   Thyroid: normal to inspection and palpation   Lungs:  breathing is unlabored  clear to auscultation bilaterally   Heart:  regular rate and rhythm, S1, S2 normal, no murmur, click, rub or gallop   Breasts:  Examined in supine position  Symmetric without masses or skin dimpling  Nipples normal without inversion, lesions or discharge  There are no " palpable axillary nodes   Abdomen: soft, non-tender; no masses  no umbilical or inginual hernias are present  no hepato-splenomegaly   Pelvis: Clinical staff was present for exam  External genitalia:  normal appearance of the external genitalia including Bartholin's and [facility]'s glands.  :  urethral meatus normal; urethral hypermobility is absent.  Vaginal:  atrophic mucosal changes are present;  Cervix:  absent  Uterus:  absent  Adnexa:  normal bimanual exam of the adnexa.   Musculoskeletal: negative   Neuro: normal without focal findings, mental status, speech normal, alert and oriented x3, and TASHIA   Psych: oriented to time, place and person, mood and affect are within normal limits, pt is a good historian; no memory problems were noted       Lab Review   CBC and CMP    Imaging  DEXA  Mammogram report    Assessment & Plan     ASSESSMENT  1. Women's annual routine gynecological examination    2. History of breast cancer        PLAN  No orders of the defined types were placed in this encounter.    No orders of the defined types were placed in this encounter.        Follow up: 1 year(s)         This note was electronically signed.    Pedro Hayes MD  July 18, 2024

## 2024-07-31 ENCOUNTER — TELEPHONE (OUTPATIENT)
Dept: INTERNAL MEDICINE | Facility: CLINIC | Age: 64
End: 2024-07-31
Payer: COMMERCIAL

## 2024-07-31 RX ORDER — RISANKIZUMAB-RZAA 150 MG/ML
150 INJECTION SUBCUTANEOUS
Qty: 1 ML | Refills: 1 | OUTPATIENT
Start: 2024-07-31

## 2024-07-31 NOTE — TELEPHONE ENCOUNTER
"Caller: Sherin Azul \"Amparo\"    Relationship: Self    Best call back number: [phone]     What orders are you requesting (i.e. lab or imaging): COMPLETE PANEL FOR LIVER AND KIDNEY FUNCTION    In what timeframe would the patient need to come in: AS SOON AS ABLE    Where will you receive your lab/imaging services: [facility]    Additional notes: PATIENT CALLED TO REQUEST LABS FOR HER LIVER AND KIDNEY FUNCTION IN ORDER TO PROVIDE TO THOSE THAT HELP WITH HER INJECTIONS EVERY 4 MONTHS    PLEASE ADVISE WHEN/IF ORDERS ARE PLACED IN CHART  "

## 2024-08-02 ENCOUNTER — PATIENT ROUNDING (BHMG ONLY) (OUTPATIENT)
Dept: OBSTETRICS AND GYNECOLOGY | Facility: CLINIC | Age: 64
End: 2024-08-02
Payer: COMMERCIAL

## 2024-08-02 NOTE — PROGRESS NOTES
My name is Mely, clinical manager    I am with [facility]  [facility]  [address]  [facility] [address] [phone]    I want to officially welcome you to our practice and ask about your recent visit.    Tell me about your visit with us.  What things went well?    We're always looking for ways to make our patients' experiences even better. Do you have recommendations on way we may improve?    Overall were you satisfied with your first visit to our practice?    I appreciate you taking the time to answer a few questions today. Is there anything else I can do for you?    Thank you, and have a great day.

## 2024-08-05 NOTE — TELEPHONE ENCOUNTER
PATIENT IS CALLING TO CHECK ON THE STATUS OF THIS REQUEST. SHE SAYS THAT SHE NEEDS TO HAVE LABS DRAWN BEFORE SHE CAN GET A REFILL OF HER MEDICATION.

## 2024-08-07 DIAGNOSIS — L40.9 PSORIASIS: ICD-10-CM

## 2024-08-07 DIAGNOSIS — Z51.81 ENCOUNTER FOR MEDICATION MONITORING: Primary | ICD-10-CM

## 2024-08-09 ENCOUNTER — SPECIALTY PHARMACY (OUTPATIENT)
Dept: PHARMACY | Facility: TELEHEALTH | Age: 64
End: 2024-08-09
Payer: COMMERCIAL

## 2024-08-09 ENCOUNTER — OFFICE VISIT (OUTPATIENT)
Dept: INTERNAL MEDICINE | Facility: CLINIC | Age: 64
End: 2024-08-09
Payer: COMMERCIAL

## 2024-08-09 ENCOUNTER — LAB (OUTPATIENT)
Dept: LAB | Facility: HOSPITAL | Age: 64
End: 2024-08-09
Payer: COMMERCIAL

## 2024-08-09 VITALS
WEIGHT: 140 LBS | HEART RATE: 65 BPM | OXYGEN SATURATION: 98 % | HEIGHT: 65 IN | SYSTOLIC BLOOD PRESSURE: 110 MMHG | BODY MASS INDEX: 23.32 KG/M2 | RESPIRATION RATE: 14 BRPM | DIASTOLIC BLOOD PRESSURE: 62 MMHG

## 2024-08-09 DIAGNOSIS — Z79.899 ENCOUNTER FOR LONG-TERM CURRENT USE OF MEDICATION: ICD-10-CM

## 2024-08-09 DIAGNOSIS — Z71.6 ENCOUNTER FOR SMOKING CESSATION COUNSELING: ICD-10-CM

## 2024-08-09 DIAGNOSIS — L40.50 PSORIATIC ARTHROPATHY: ICD-10-CM

## 2024-08-09 DIAGNOSIS — Z79.899 ENCOUNTER FOR LONG-TERM (CURRENT) USE OF OTHER MEDICATIONS: Primary | ICD-10-CM

## 2024-08-09 DIAGNOSIS — L40.4 GUTTATE PSORIASIS: Primary | ICD-10-CM

## 2024-08-09 LAB
ALBUMIN SERPL-MCNC: 4.6 G/DL (ref 3.5–5.2)
ALBUMIN/GLOB SERPL: 1.6 G/DL
ALP SERPL-CCNC: 80 U/L (ref 39–117)
ALT SERPL W P-5'-P-CCNC: 13 U/L (ref 1–33)
ANION GAP SERPL CALCULATED.3IONS-SCNC: 7.9 MMOL/L (ref 5–15)
AST SERPL-CCNC: 19 U/L (ref 1–32)
BASOPHILS # BLD AUTO: 0.05 10*3/MM3 (ref 0–0.2)
BASOPHILS NFR BLD AUTO: 0.6 % (ref 0–1.5)
BILIRUB SERPL-MCNC: 0.3 MG/DL (ref 0–1.2)
BUN SERPL-MCNC: 10 MG/DL (ref 8–23)
BUN/CREAT SERPL: 13.9 (ref 7–25)
CALCIUM SPEC-SCNC: 10 MG/DL (ref 8.6–10.5)
CHLORIDE SERPL-SCNC: 106 MMOL/L (ref 98–107)
CO2 SERPL-SCNC: 26.1 MMOL/L (ref 22–29)
CREAT SERPL-MCNC: 0.72 MG/DL (ref 0.57–1)
DEPRECATED RDW RBC AUTO: 42.1 FL (ref 37–54)
EGFRCR SERPLBLD CKD-EPI 2021: 94.7 ML/MIN/1.73
EOSINOPHIL # BLD AUTO: 0.16 10*3/MM3 (ref 0–0.4)
EOSINOPHIL NFR BLD AUTO: 1.8 % (ref 0.3–6.2)
ERYTHROCYTE [DISTWIDTH] IN BLOOD BY AUTOMATED COUNT: 12.2 % (ref 12.3–15.4)
GLOBULIN UR ELPH-MCNC: 2.9 GM/DL
GLUCOSE SERPL-MCNC: 84 MG/DL (ref 65–99)
HCT VFR BLD AUTO: 45 % (ref 34–46.6)
HGB BLD-MCNC: 15.3 G/DL (ref 12–15.9)
IMM GRANULOCYTES # BLD AUTO: 0.05 10*3/MM3 (ref 0–0.05)
IMM GRANULOCYTES NFR BLD AUTO: 0.6 % (ref 0–0.5)
LYMPHOCYTES # BLD AUTO: 2.49 10*3/MM3 (ref 0.7–3.1)
LYMPHOCYTES NFR BLD AUTO: 27.5 % (ref 19.6–45.3)
MCH RBC QN AUTO: 32.3 PG (ref 26.6–33)
MCHC RBC AUTO-ENTMCNC: 34 G/DL (ref 31.5–35.7)
MCV RBC AUTO: 95.1 FL (ref 79–97)
MONOCYTES # BLD AUTO: 0.73 10*3/MM3 (ref 0.1–0.9)
MONOCYTES NFR BLD AUTO: 8.1 % (ref 5–12)
NEUTROPHILS NFR BLD AUTO: 5.57 10*3/MM3 (ref 1.7–7)
NEUTROPHILS NFR BLD AUTO: 61.4 % (ref 42.7–76)
NRBC BLD AUTO-RTO: 0 /100 WBC (ref 0–0.2)
PLATELET # BLD AUTO: 247 10*3/MM3 (ref 140–450)
PMV BLD AUTO: 8.8 FL (ref 6–12)
POTASSIUM SERPL-SCNC: 4.9 MMOL/L (ref 3.5–5.2)
PROT SERPL-MCNC: 7.5 G/DL (ref 6–8.5)
RBC # BLD AUTO: 4.73 10*6/MM3 (ref 3.77–5.28)
SODIUM SERPL-SCNC: 140 MMOL/L (ref 136–145)
WBC NRBC COR # BLD AUTO: 9.05 10*3/MM3 (ref 3.4–10.8)

## 2024-08-09 PROCEDURE — G0432 EIA HIV-1/HIV-2 SCREEN: HCPCS

## 2024-08-09 PROCEDURE — 99214 OFFICE O/P EST MOD 30 MIN: CPT | Performed by: FAMILY MEDICINE

## 2024-08-09 PROCEDURE — 80053 COMPREHEN METABOLIC PANEL: CPT

## 2024-08-09 PROCEDURE — 86480 TB TEST CELL IMMUN MEASURE: CPT

## 2024-08-09 PROCEDURE — 85025 COMPLETE CBC W/AUTO DIFF WBC: CPT

## 2024-08-09 PROCEDURE — 36415 COLL VENOUS BLD VENIPUNCTURE: CPT

## 2024-08-09 PROCEDURE — 80074 ACUTE HEPATITIS PANEL: CPT | Performed by: FAMILY MEDICINE

## 2024-08-09 RX ORDER — RISANKIZUMAB-RZAA 150 MG/ML
150 INJECTION SUBCUTANEOUS
Qty: 1 ML | Refills: 1 | Status: SHIPPED | OUTPATIENT
Start: 2024-08-09

## 2024-08-09 NOTE — PROGRESS NOTES
Office Note     Name: Sherin Azul    : 1960     MRN: [id number]     Chief Complaint  Med Refill (Pt in office to have labs done for the Skyrizi rx) and Labs Only    Subjective     History of Present Illness:  Sherin Azul is a 64 y.o. female who presents today for refill for Skyrizi- previously prescribed by dermatology   Blood today- ordered  Has been on current medication for a little over a year   Good improvement   Tolerates medication well   Would like to continue on medication while finding new dermatology       Review of Systems:   Review of Systems   Constitutional:  Negative for chills and fever.   Respiratory:  Negative for cough, chest tightness, shortness of breath and wheezing.    Cardiovascular:  Negative for chest pain, palpitations and leg swelling.   Gastrointestinal:  Negative for abdominal pain, nausea and vomiting.   Genitourinary:  Negative for dysuria.   Musculoskeletal:  Positive for arthralgias and myalgias. Negative for gait problem.   Skin:  Positive for rash.        Psoriasis    Neurological:  Negative for light-headedness and headache.   Psychiatric/Behavioral:  Negative for dysphoric mood.        Past Medical History:   Past Medical History:   Diagnosis Date    Acrochordon     Acute maxillary sinusitis     Acute otitis media     Breast cancer 2005    left    Dermatitis     Drug therapy 2005    Eczema     H/O alcohol abuse     Herpes     History of breast cancer     History of cervical dysplasia     History of MRSA infection     Hx of radiation therapy 2005    Joint pain, hip     Joint pain, knee     Osteoarthritis     Psoriasis     Scabies     Tendonitis     Urinary tract infection     Varicose veins of legs     Viral syndrome     Weight gain        Past Surgical History:   Past Surgical History:   Procedure Laterality Date    BREAST BIOPSY Left 2005    BREAST LUMPECTOMY Left 2005    lumpectomy    LAPAROSCOPIC ASSISTED VAGINAL HYSTERECTOMY SALPINGO  OOPHORECTOMY Bilateral     OOPHORECTOMY         Immunizations:   Immunization History   Administered Date(s) Administered    COVID-19 (PFIZER) Purple Cap Monovalent 12/23/2020, 01/13/2021    Flu Vaccine Intradermal Quad 18-64YR 09/24/2019    Hepatitis B Adult/Adolescent IM 12/21/1998, 01/21/1999, 07/08/1999    Influenza, Unspecified 10/10/2022    Td (TDVAX) 12/21/1998    Tdap 02/14/2020        Medications:     Current Outpatient Medications:     HYDROcodone-acetaminophen (NORCO) 7.5-325 MG per tablet, , Disp: , Rfl:     omeprazole (priLOSEC) 40 MG capsule, Take 1 capsule by mouth Daily as directed for GERD, Disp: 90 capsule, Rfl: 3    Risankizumab-rzaa (Skyrizi Pen) 150 MG/ML solution auto-injector, Inject 150 mg under the skin into the appropriate area as directed Every 3 (Three) Months., Disp: 1 mL, Rfl: 1    nicotine polacrilex (Nicorette) 4 MG gum, Chew 1 each As Needed for Smoking Cessation., Disp: 220 each, Rfl: 3    Allergies:   Allergies   Allergen Reactions    Meloxicam Nausea Only       Family History:   Family History   Problem Relation Age of Onset    Breast cancer Mother 40    Stomach cancer Father     Breast cancer Sister 43    Ovarian cancer Sister 40    Bone cancer Brother     Cancer Brother         bladder    Bone cancer Brother     Cancer Brother         kidney    Throat cancer Brother     Diabetes Brother     Stroke Maternal Grandmother     Breast cancer Maternal Aunt 45       Social History:   Social History     Socioeconomic History    Marital status:    Tobacco Use    Smoking status: Every Day     Current packs/day: 1.00     Average packs/day: 1 pack/day for 49.6 years (49.6 ttl pk-yrs)     Types: Cigarettes     Start date: 1975     Passive exposure: Current    Smokeless tobacco: Never   Vaping Use    Vaping status: Never Used   Substance and Sexual Activity    Alcohol use: No    Drug use: No    Sexual activity: Not Currently         Objective     Vital Signs  /62   Pulse 65    "Resp 14   Ht 165.1 cm (65\")   Wt 63.5 kg (140 lb)   SpO2 98%   BMI 23.30 kg/m²   Estimated body mass index is 23.3 kg/m² as calculated from the following:    Height as of this encounter: 165.1 cm (65\").    Weight as of this encounter: 63.5 kg (140 lb).    BMI is within normal parameters. No other follow-up for BMI required.      Physical Exam  Vitals and nursing note reviewed.   Constitutional:       General: She is not in acute distress.     Appearance: Normal appearance.   HENT:      Head: Normocephalic and atraumatic.   Cardiovascular:      Rate and Rhythm: Normal rate and regular rhythm.      Heart sounds: Normal heart sounds.   Pulmonary:      Effort: Pulmonary effort is normal. No respiratory distress.      Breath sounds: Normal breath sounds.   Musculoskeletal:         General: Normal range of motion.      Comments: Moving all extremities    Skin:     General: Skin is warm and dry.   Neurological:      General: No focal deficit present.      Mental Status: She is alert and oriented to person, place, and time.          Procedures     Assessment and Plan   Diagnosis Discussed   Continue to monitor   Plenty of fluids, monitor diet and exercise   Labs ordered will notify of results   Take medications as instructed- ordered today   Will look for new dermatology   Continue to work on smoking cessation- goal set to quit   Follow up as directed   If symptoms worsen or persist please seek further evaluation     1. Guttate psoriasis  - Risankizumab-rzaa (Skyrizi Pen) 150 MG/ML solution auto-injector; Inject 150 mg under the skin into the appropriate area as directed Every 3 (Three) Months.  Dispense: 1 mL; Refill: 1    2. Psoriatic arthropathy  - Risankizumab-rzaa (Skyrizi Pen) 150 MG/ML solution auto-injector; Inject 150 mg under the skin into the appropriate area as directed Every 3 (Three) Months.  Dispense: 1 mL; Refill: 1    3. Encounter for long-term current use of medication  - Comprehensive Metabolic Panel; " Future  - QuantiFERON TB Plus Client Incubated; Future  - CBC & Differential; Future  - Hepatitis Panel, Acute  - HIV-1 / O / 2 Ag / Antibody; Future    4. Encounter for smoking cessation counseling  - nicotine polacrilex (Nicorette) 4 MG gum; Chew 1 each As Needed for Smoking Cessation.  Dispense: 220 each; Refill: 3       Follow Up  Return if symptoms worsen or fail to improve, for Next scheduled follow up- 3/6/2025- physical .    Saira Thompson MD  [facility]  [address] [place] [id number] [phone]

## 2024-08-09 NOTE — PATIENT INSTRUCTIONS
Diagnosis Discussed   Continue to monitor   Plenty of fluids, monitor diet and exercise   Labs ordered will notify of results   Take medications as instructed- ordered today   Will look for new dermatology   Continue to work on smoking cessation- goal set to quit   Follow up as directed   If symptoms worsen or persist please seek further evaluation

## 2024-08-10 LAB
HAV IGM SERPL QL IA: NORMAL
HBV CORE IGM SERPL QL IA: NORMAL
HBV SURFACE AG SERPL QL IA: NORMAL
HCV AB SER QL: NORMAL
HIV 1+2 AB+HIV1 P24 AG SERPL QL IA: NORMAL

## 2024-08-12 ENCOUNTER — SPECIALTY PHARMACY (OUTPATIENT)
Age: 64
End: 2024-08-12
Payer: COMMERCIAL

## 2024-08-12 NOTE — PROGRESS NOTES
Specialty Pharmacy Patient Management Program  Reassessment     Sherin Azul is a 64 y.o. female with psoriasis and enrolled in the Patient Management program offered by [facility]. A follow-up outreach was conducted, including assessment of continued therapy appropriateness, medication adherence, and side effect incidence and management for Skyrizi 150mg/ml.     Changes to Insurance Coverage or Financial Support  none    Relevant Past Medical History and Comorbidities  Relevant medical history and concomitant health conditions were discussed with the patient. The patient's chart has been reviewed for relevant past medical history and comorbid health conditions and updated as necessary.   Past Medical History:   Diagnosis Date    Acrochordon     Acute maxillary sinusitis     Acute otitis media     Breast cancer 2005    left    Dermatitis     Drug therapy 2005    Eczema     H/O alcohol abuse     Herpes     History of breast cancer     History of cervical dysplasia     History of MRSA infection     Hx of radiation therapy 2005    Joint pain, hip     Joint pain, knee     Osteoarthritis     Psoriasis     Scabies     Tendonitis     Urinary tract infection     Varicose veins of legs     Viral syndrome     Weight gain      Social History     Socioeconomic History    Marital status:    Tobacco Use    Smoking status: Every Day     Current packs/day: 1.00     Average packs/day: 1 pack/day for 49.6 years (49.6 ttl pk-yrs)     Types: Cigarettes     Start date: 1975     Passive exposure: Current    Smokeless tobacco: Never   Vaping Use    Vaping status: Never Used   Substance and Sexual Activity    Alcohol use: No    Drug use: No    Sexual activity: Not Currently     Problem list reviewed by Alejandro Gant RPH on 8/12/2024 at 11:40 AM    Allergies  Known allergies and reactions were discussed with the patient. The patient's chart has been reviewed for allergy information and updated  as necessary.   Allergies   Allergen Reactions    Meloxicam Nausea Only     Allergies reviewed by Alejandro Gant RPH on 8/12/2024 at 11:40 AM    Relevant Laboratory Values  Lab Results   Component Value Date    GLUCOSE 84 08/09/2024    CALCIUM 10.0 08/09/2024     08/09/2024    K 4.9 08/09/2024    CO2 26.1 08/09/2024     08/09/2024    BUN 10 08/09/2024    CREATININE 0.72 08/09/2024    BCR 13.9 08/09/2024    ANIONGAP 7.9 08/09/2024     Lab Results   Component Value Date    WBC 9.05 08/09/2024    HGB 15.3 08/09/2024    HCT 45.0 08/09/2024    MCV 95.1 08/09/2024     08/09/2024     Lab Value Review  The above lab values have been reviewed; the following specialty medication(s) dose adjustment(s) are recommended: none.    Current Medication List  This medication list has been reviewed with the patient and evaluated for any interactions or necessary modifications/recommendations, and updated to include all prescription medications, OTC medications, and supplements the patient is currently taking. This list reflects what is contained in the patient's profile, which has also been marked as reviewed to communicate to other providers it is the most up to date version of the patient's current medication therapy.     Current Outpatient Medications:     HYDROcodone-acetaminophen (NORCO) 7.5-325 MG per tablet, , Disp: , Rfl:     nicotine polacrilex (Nicorette) 4 MG gum, Chew 1 each As Needed for Smoking Cessation., Disp: 220 each, Rfl: 3    omeprazole (priLOSEC) 40 MG capsule, Take 1 capsule by mouth Daily as directed for GERD, Disp: 90 capsule, Rfl: 3    Risankizumab-rzaa (Skyrizi Pen) 150 MG/ML solution auto-injector, Inject 150 mg under the skin into the appropriate area as directed Every 3 (Three) Months., Disp: 1 mL, Rfl: 1  Medicines reviewed by Alejandro Gant RPH on 8/12/2024 at 11:40 AM    Drug Interactions  none     Adverse Drug Reactions  Medication tolerability: Tolerating with no to minimal  ADRs  Medication plan: Continue therapy with normal follow-up  Plan for ADR Management: none    Hospitalizations and Urgent Care Since Last Assessment  Hospitalizations or Admissions: none  ED Visits: none  Urgent Office Visits: none     Adherence, Self-Administration, and Current Therapy Problems  Adherence related to the patient's specialty therapy was discussed with the patient. The Adherence segment of this outreach has been reviewed and updated.     Adherence Questions  Linked Medication(s) Assessed: Risankizumab-Rzaa (Antipsoriatics)  On average, how many doses/injections does the patient miss per month?: 0  What are the identified reasons for non-adherence or missed doses? : no problems identified  What is the estimated medication adherence level?: %  Based on the patient/caregiver response and refill history, does this patient require an MTP to track adherence improvements?: no    Additional Barriers to Patient Self-Administration: none  Methods for Supporting Patient Self-Administration: none    Open Medication Therapy Problems  No medication therapy recommendations to display    Goals of Therapy  Goals related to the patient's specialty therapy were discussed with the patient. The Patient Goals segment of this outreach has been reviewed and updated.   Goals Addressed Today        Specialty Pharmacy General Goal      A reduction in BSA of skin lesions on the body.                Progress Toward Meeting Patient-Identified Goals of Therapy: On Track  New Patient-Identified Goals, If Applicable:     Progress Toward Meeting Clinical Goals or Therapeutic Targets: On Track  New Clinical Goals or Therapeutic Targets, If Applicable:     Quality of Life Assessment   Quality of Life related to the patient's enrollment in the patient management program and services provided was discussed with the patient. The QOL segment of this outreach has been reviewed and updated.  Quality of Life Improvement Scale: 9-A good  deal better    Reassessment Plan & Follow-Up  Medication Therapy Changes: none  Additional Plans, Therapy Recommendations, or Therapy Problems to Be Addressed: none   Pharmacist to perform regular reassessments no more than (6) months from the previous assessment.  Care Coordinator to set up future refill outreaches, coordinate prescription delivery, and escalate clinical questions to pharmacist.     Attestation  I attest the patient was actively involved in and has agreed to the above plan of care. I attest that the specialty medication(s) addressed above are appropriate for the patient based on my reassessment. If the prescribed therapy is at any point deemed not appropriate based on the current or future assessments, a consultation will be initiated with the patient's specialty care provider to determine the best course of action. The revised plan of therapy will be documented along with any required assessments and/or additional patient education provided.     Electronically signed by Alejandro Gant RPH, 08/12/24, 11:41 AM EDT.

## 2024-08-12 NOTE — PROGRESS NOTES
" Specialty Pharmacy Patient Management Program  Call Center Refill Outreach      Sherin \"Martine" is a 64 y.o. female contacted today regarding refills of her medication(s).    Specialty medication(s) and dose(s) confirmed: skyrizi 150mg/ml  Other medications being refilled: none    Refill Questions      Flowsheet Row Most Recent Value   Changes to allergies? No   Changes to medications? No   New conditions or infections since last clinic visit No   Unplanned office visit, urgent care, ED, or hospital admission in the last 4 weeks  No   How does patient/caregiver feel medication is working? Good   Financial problems or insurance changes  No   Since the previous refill, were any specialty medication doses or scheduled injections missed or delayed?  No   Does this patient require a clinical escalation to a pharmacist? No            Delivery Questions      Flowsheet Row Most Recent Value   Delivery method  at Pharmacy   Number of medications in delivery 1   Medication(s) being filled and delivered Risankizumab-Rzaa (Antipsoriatics)   Copay verified? Yes   Copay amount 0.00   Copay form of payment No copayment ($0)   Ship Date n/a   Delivery Date n/a   Signature Required No            Medication Adherence    Adherence tools used: directed education  Support network for adherence: family member            Follow-up: 3 months     Alejandro Gant RPH  Specialty Pharmacist  8/12/2024  11:41 EDT  '  "

## 2024-08-13 LAB
GAMMA INTERFERON BACKGROUND BLD IA-ACNC: 0 IU/ML
M TB IFN-G BLD-IMP: NEGATIVE
M TB IFN-G CD4+ BCKGRND COR BLD-ACNC: 0 IU/ML
M TB IFN-G CD4+CD8+ BCKGRND COR BLD-ACNC: 0 IU/ML
MITOGEN IGNF BCKGRD COR BLD-ACNC: >10 IU/ML
QUANTIFERON INCUBATION: NORMAL
SERVICE CMNT-IMP: NORMAL

## 2024-11-01 ENCOUNTER — SPECIALTY PHARMACY (OUTPATIENT)
Dept: PHARMACY | Facility: TELEHEALTH | Age: 64
End: 2024-11-01
Payer: COMMERCIAL

## 2024-11-01 NOTE — PROGRESS NOTES
"Specialty Pharmacy Refill Coordination Note     Sherin \"Martine" is a 64 y.o. female contacted today regarding refills of  Skyrizi specialty medication(s).    Reviewed and verified with patient:  Allergies  Meds       Specialty medication(s) and dose(s) confirmed: yes    Refill Questions      Flowsheet Row Most Recent Value   Changes to allergies? No   Changes to medications? No   New conditions or infections since last clinic visit No   Unplanned office visit, urgent care, ED, or hospital admission in the last 4 weeks  No   How does patient/caregiver feel medication is working? Very good   Financial problems or insurance changes  No   Since the previous refill, were any specialty medication doses or scheduled injections missed or delayed?  No  [Next dose due on 11/11]   Does this patient require a clinical escalation to a pharmacist? No            Delivery Questions      Flowsheet Row Most Recent Value   Delivery method  at Pharmacy   Delivery address verified with patient/caregiver? No  [Pt  at Pharmacy]   Delivery address Other (Comment)  [Pt  at Pharmacy]   Number of medications in delivery 1  [Pt  at Pharmacy]   Medication(s) being filled and delivered Risankizumab-rzaa (Skyrizi Pen)  [Pt  at Pharmacy]   Doses left of specialty medications 0   Copay verified? Yes   Copay amount $0   Copay form of payment No copayment ($0)   Ship Date Pt  at Pharmacy   Delivery Date Pt  at Pharmacy   Signature Required No              Medication Adherence    Adherence tools used: directed education  Support network for adherence: family member          Follow-up: 77 day(s)     Yoli Rueda, Pharmacy Technician  Specialty Pharmacy Technician        "

## 2024-12-05 ENCOUNTER — OFFICE VISIT (OUTPATIENT)
Dept: OBSTETRICS AND GYNECOLOGY | Facility: CLINIC | Age: 64
End: 2024-12-05
Payer: COMMERCIAL

## 2024-12-05 VITALS
DIASTOLIC BLOOD PRESSURE: 66 MMHG | WEIGHT: 139 LBS | BODY MASS INDEX: 23.16 KG/M2 | HEIGHT: 65 IN | SYSTOLIC BLOOD PRESSURE: 120 MMHG

## 2024-12-05 DIAGNOSIS — M62.89 PELVIC FLOOR DYSFUNCTION: Primary | ICD-10-CM

## 2024-12-05 PROCEDURE — 87086 URINE CULTURE/COLONY COUNT: CPT | Performed by: OBSTETRICS & GYNECOLOGY

## 2024-12-05 PROCEDURE — 81001 URINALYSIS AUTO W/SCOPE: CPT | Performed by: OBSTETRICS & GYNECOLOGY

## 2024-12-05 NOTE — LETTER
December 5, 2024     Patient: Sherin Azul   YOB: 1960   Date of Visit: 12/5/2024       To Whom It May Concern:    It is my medical opinion that Sherin Azul may return to light duty immediately with the following restrictions: No lifting, pushing or pulling over 10 lbs .           Sincerely,        Pedro Hayes MD    CC: No Recipients

## 2024-12-05 NOTE — PROGRESS NOTES
Subjective   Chief Complaint   Patient presents with    Gynecologic Exam     Prolapsed bladder getting worse leakage of urine, pain and frequency     Sherin Azul is a 64 y.o. year old .  No LMP recorded. Patient has had a hysterectomy.  She presents with complaint of bulge from vagina. This is associated with a variety of symptoms that have developed or worsened over the last 6 months: Increased urinary frequency 10-15/day associated with urgency. Loss of urine with lifting. There is the sensation of incomplete voiding. Normal flow of urine. Small volume voidingi. New nocturia 2/night/ Was treated for UTI 3/2024 K.aerogenes. No test of cure. There is some urgency with defecation without incontinence. She has daily diffuse pelvic discomfort. She is not sexually active. She has never been on HRT. She had a hysterectomy years ago for benign indications.       The following portions of the patient's history were reviewed and updated as appropriate:She  has a past medical history of Acrochordon, Acute maxillary sinusitis, Acute otitis media, Breast cancer (), Dermatitis, Drug therapy (), Eczema, H/O alcohol abuse, Herpes, History of breast cancer, History of cervical dysplasia, History of MRSA infection, radiation therapy (), Joint pain, hip, Joint pain, knee, Osteoarthritis, Psoriasis, Scabies, Tendonitis, Urinary tract infection, Varicose veins of legs, Viral syndrome, and Weight gain.  She does not have any pertinent problems on file.  She  has a past surgical history that includes Breast biopsy (Left, ); Breast lumpectomy (Left, ); Oophorectomy; and laparoscopic assisted vaginal hysterectomy salpingo oophorectomy (Bilateral).  Her family history includes Bone cancer in her brother and brother; Breast cancer (age of onset: 40) in her mother; Breast cancer (age of onset: 43) in her sister; Breast cancer (age of onset: 45) in her maternal aunt; Cancer in her brother and brother;  Diabetes in her brother; Ovarian cancer (age of onset: 40) in her sister; Stomach cancer in her father; Stroke in her maternal grandmother; Throat cancer in her brother.  She  reports that she has been smoking cigarettes. She started smoking about 49 years ago. She has a 49.9 pack-year smoking history. She has been exposed to tobacco smoke. She has never used smokeless tobacco. She reports that she does not drink alcohol and does not use drugs.  Current Outpatient Medications   Medication Sig Dispense Refill    HYDROcodone-acetaminophen (NORCO) 7.5-325 MG per tablet       Risankizumab-rzaa (Skyrizi Pen) 150 MG/ML solution auto-injector Inject 150 mg under the skin into the appropriate area as directed Every 3 (Three) Months. 1 mL 1    nicotine polacrilex (Nicorette) 4 MG gum Chew 1 each As Needed for Smoking Cessation. 220 each 3    omeprazole (priLOSEC) 40 MG capsule Take 1 capsule by mouth Daily as directed for GERD 90 capsule 3     No current facility-administered medications for this visit.     Current Outpatient Medications on File Prior to Visit   Medication Sig    HYDROcodone-acetaminophen (NORCO) 7.5-325 MG per tablet     Risankizumab-rzaa (Skyrizi Pen) 150 MG/ML solution auto-injector Inject 150 mg under the skin into the appropriate area as directed Every 3 (Three) Months.    nicotine polacrilex (Nicorette) 4 MG gum Chew 1 each As Needed for Smoking Cessation.    omeprazole (priLOSEC) 40 MG capsule Take 1 capsule by mouth Daily as directed for GERD     No current facility-administered medications on file prior to visit.     She is allergic to meloxicam.    Social History    Tobacco Use      Smoking status: Every Day        Packs/day: 1.00        Years: 1 pack/day for 49.9 years (49.9 ttl pk-yrs)        Types: Cigarettes        Start date: 1975        Passive exposure: Current      Smokeless tobacco: Never    Review of Systems  Constitutional POS: nothing reported    NEG: anorexia or night sweats  "  Genitourinary POS: ADA is present and it IS effecting her ADL's, frequency, nocturia, and urgency    NEG: bends forward to more fully empty bladder or hesitancy   Gastointestinal POS: see HPI    NEG: bloating, constipation, diarrhea, jaundice, melena, nausea, reflux symptoms, or vomiting   Integument POS: nothing reported    NEG: moles that are changing in size, shape, color or rashes   Breast POS: nothing reported    NEG: persistent breast lump, skin dimpling, or nipple discharge         Objective   /66   Ht 165.1 cm (65\")   Wt 63 kg (139 lb)   Breastfeeding No   BMI 23.13 kg/m²     General:  well developed; well nourished  no acute distress  mentation appropriate   Skin:  Not performed.   Thyroid: not examined   Lungs:  breathing is unlabored   Heart:  Not performed.   Breasts:  Not performed.   Abdomen: soft, non-tender; no masses  no umbilical or inguinal hernias are present  no hepato-splenomegaly   Pelvis: Clinical staff was present for exam  External genitalia:  normal appearance of the external genitalia including Bartholin's and Waurika's glands.  :  urethral meatus normal; urethra hypermobile;  Vaginal:  atrophic mucosal changes are present;  Cervix:  absent.  Uterus:  absent.  Adnexa:  normal bimanual exam of the adnexa.  Rectal:  good sphincter tone; no masses;  Rectocele GRADE 1  Vaginal vault prolapse GRADE 2     Lab Review   Urine Culture    Imaging   No data reviewed        Assessment   Pelvic floor dysfunction. Stress incontinence, frequency. Vaginal vault prolapse and small rectocele all of which are affecting the ADL years after hysterectomy for benign conditions in a patient with relative contraindication to ERT.     Plan   Recommend formal evaluation by Pelvic Medicine and Reconstructive Surgery for surgical or non-surgical management. Urine culture done today.   The importance of keeping all planned follow-up and taking all medications as prescribed was emphasized.  Follow up for " annual exam or as needed.    No orders of the defined types were placed in this encounter.       I spent 50 minutes caring for Sherin on this date of service. This time includes time spent by me in the following activities: reviewing tests, obtaining and/or reviewing a separately obtained history, performing a medically appropriate examination and/or evaluation, counseling and educating the patient/family/caregiver, ordering medications, tests, or procedures, referring and communicating with other health care professionals, documenting information in the medical record, and care coordination.     This note was electronically signed.    Pedro Hayes MD  December 5, 2024    Part of this note may be an electronic transcription/translation of spoken language to printed text using the Dragon Dictation System.

## 2024-12-05 NOTE — LETTER
December 5, 2024     Patient: Sherin Azul   YOB: 1960   Date of Visit: 12/5/2024       To Whom It May Concern:    It is my medical opinion that Sherin Azul may return to light duty immediately with the following restrictions: no lifting pushing or pulling over 10 lbs .           Sincerely,        Pedro Hayes MD    CC: No Recipients

## 2024-12-06 LAB
BACTERIA UR QL AUTO: ABNORMAL /HPF
BILIRUB UR QL STRIP: NEGATIVE
CLARITY UR: CLEAR
COLOR UR: YELLOW
GLUCOSE UR STRIP-MCNC: NEGATIVE MG/DL
HGB UR QL STRIP.AUTO: NEGATIVE
HOLD SPECIMEN: NORMAL
HYALINE CASTS UR QL AUTO: ABNORMAL /LPF
KETONES UR QL STRIP: NEGATIVE
LEUKOCYTE ESTERASE UR QL STRIP.AUTO: ABNORMAL
NITRITE UR QL STRIP: NEGATIVE
PH UR STRIP.AUTO: 6.5 [PH] (ref 5–8)
PROT UR QL STRIP: NEGATIVE
RBC # UR STRIP: ABNORMAL /HPF
REF LAB TEST METHOD: ABNORMAL
SP GR UR STRIP: 1.02 (ref 1–1.03)
SQUAMOUS #/AREA URNS HPF: ABNORMAL /HPF
UROBILINOGEN UR QL STRIP: ABNORMAL
WBC # UR STRIP: ABNORMAL /HPF

## 2024-12-07 LAB — BACTERIA SPEC AEROBE CULT: NORMAL

## 2024-12-08 PROCEDURE — 87086 URINE CULTURE/COLONY COUNT: CPT

## 2025-01-21 DIAGNOSIS — L40.50 PSORIATIC ARTHROPATHY: ICD-10-CM

## 2025-01-21 DIAGNOSIS — L40.4 GUTTATE PSORIASIS: ICD-10-CM

## 2025-01-21 RX ORDER — RISANKIZUMAB-RZAA 150 MG/ML
150 INJECTION SUBCUTANEOUS
Qty: 1 ML | Refills: 0 | Status: SHIPPED | OUTPATIENT
Start: 2025-01-21

## 2025-01-22 ENCOUNTER — SPECIALTY PHARMACY (OUTPATIENT)
Dept: PHARMACY | Facility: TELEHEALTH | Age: 65
End: 2025-01-22
Payer: COMMERCIAL

## 2025-01-24 ENCOUNTER — SPECIALTY PHARMACY (OUTPATIENT)
Dept: PHARMACY | Facility: TELEHEALTH | Age: 65
End: 2025-01-24
Payer: COMMERCIAL

## 2025-01-24 NOTE — PROGRESS NOTES
" Specialty Pharmacy Patient Management Program  Call Center Refill Outreach      Sherin \"Amparo\" is a 64 y.o. female contacted today regarding refills of her medication(s).    Specialty medication(s) and dose(s) confirmed: skyrizi  Other medications being refilled: none    Refill Questions      Flowsheet Row Most Recent Value   Changes to allergies? No   Changes to medications? No   New conditions or infections since last clinic visit No   Unplanned office visit, urgent care, ED, or hospital admission in the last 4 weeks  No   How does patient/caregiver feel medication is working? Very good   Financial problems or insurance changes  No   Since the previous refill, were any specialty medication doses or scheduled injections missed or delayed?  No   Does this patient require a clinical escalation to a pharmacist? No            Delivery Questions      Flowsheet Row Most Recent Value   Delivery method [employer]   Delivery address verified with patient/caregiver? Yes   Medication(s) being filled and delivered Risankizumab-rzaa (Skyrizi Pen)   Copay verified? Yes   Copay amount 0.00   Copay form of payment No copayment ($0)   Ship Date 1/27/25   Delivery Date Selection 01/28/25   Signature Required No            Medication Adherence    Adherence tools used: directed education  Support network for adherence: family member            Follow-up: 3 months     Alejandro Gant RPH  Specialty Pharmacist  1/24/2025  11:38 EST    "

## 2025-02-18 ENCOUNTER — TELEPHONE (OUTPATIENT)
Dept: INTERNAL MEDICINE | Facility: CLINIC | Age: 65
End: 2025-02-18

## 2025-02-18 NOTE — TELEPHONE ENCOUNTER
"Caller: Sherin Azul \"Amparo\"    Relationship to patient: Self    Best call back number: [phone]     Type of visit: PHYSICAL    If rescheduling, when is the original appointment: 03/06/25     Additional notes:PATIENT HAD A PHYSICAL SCHEDULED WITH PCP, BUT SHE WASN'T ABLE TO MAKE IT DUE TO A SHIFT CHANGE AT WORK. REQUESTED A PA OR AN APRN TO SEE HER SO SHE COULD GET IN SOONER THAN PCP NEXT AVAILABLY       "

## 2025-03-07 ENCOUNTER — PRIOR AUTHORIZATION (OUTPATIENT)
Dept: INTERNAL MEDICINE | Facility: CLINIC | Age: 65
End: 2025-03-07

## 2025-03-07 ENCOUNTER — OFFICE VISIT (OUTPATIENT)
Dept: INTERNAL MEDICINE | Facility: CLINIC | Age: 65
End: 2025-03-07
Payer: COMMERCIAL

## 2025-03-07 ENCOUNTER — LAB (OUTPATIENT)
Dept: LAB | Facility: HOSPITAL | Age: 65
End: 2025-03-07
Payer: COMMERCIAL

## 2025-03-07 VITALS
WEIGHT: 160 LBS | BODY MASS INDEX: 26.66 KG/M2 | DIASTOLIC BLOOD PRESSURE: 70 MMHG | RESPIRATION RATE: 14 BRPM | HEIGHT: 65 IN | HEART RATE: 62 BPM | OXYGEN SATURATION: 96 % | SYSTOLIC BLOOD PRESSURE: 120 MMHG

## 2025-03-07 DIAGNOSIS — Z12.2 SCREENING FOR LUNG CANCER: ICD-10-CM

## 2025-03-07 DIAGNOSIS — Z13.21 SCREENING FOR ENDOCRINE, NUTRITIONAL, METABOLIC AND IMMUNITY DISORDER: ICD-10-CM

## 2025-03-07 DIAGNOSIS — Z13.29 SCREENING FOR ENDOCRINE, NUTRITIONAL, METABOLIC AND IMMUNITY DISORDER: ICD-10-CM

## 2025-03-07 DIAGNOSIS — L40.9 PSORIASIS: ICD-10-CM

## 2025-03-07 DIAGNOSIS — Z13.228 SCREENING FOR ENDOCRINE, NUTRITIONAL, METABOLIC AND IMMUNITY DISORDER: ICD-10-CM

## 2025-03-07 DIAGNOSIS — Z00.00 WELLNESS EXAMINATION: Primary | ICD-10-CM

## 2025-03-07 DIAGNOSIS — Z13.0 SCREENING FOR ENDOCRINE, NUTRITIONAL, METABOLIC AND IMMUNITY DISORDER: ICD-10-CM

## 2025-03-07 DIAGNOSIS — Z13.220 SCREENING, LIPID: ICD-10-CM

## 2025-03-07 DIAGNOSIS — D22.9 MULTIPLE ATYPICAL SKIN MOLES: ICD-10-CM

## 2025-03-07 DIAGNOSIS — L30.9 ECZEMA, UNSPECIFIED TYPE: ICD-10-CM

## 2025-03-07 DIAGNOSIS — Z79.899 ENCOUNTER FOR LONG-TERM CURRENT USE OF MEDICATION: ICD-10-CM

## 2025-03-07 DIAGNOSIS — E55.9 VITAMIN D DEFICIENCY: ICD-10-CM

## 2025-03-07 DIAGNOSIS — L40.50 PSORIATIC ARTHROPATHY: ICD-10-CM

## 2025-03-07 DIAGNOSIS — K21.9 GASTROESOPHAGEAL REFLUX DISEASE, UNSPECIFIED WHETHER ESOPHAGITIS PRESENT: ICD-10-CM

## 2025-03-07 DIAGNOSIS — L40.4 GUTTATE PSORIASIS: ICD-10-CM

## 2025-03-07 DIAGNOSIS — Z12.31 VISIT FOR SCREENING MAMMOGRAM: ICD-10-CM

## 2025-03-07 LAB
DEPRECATED RDW RBC AUTO: 43.9 FL (ref 37–54)
ERYTHROCYTE [DISTWIDTH] IN BLOOD BY AUTOMATED COUNT: 12.7 % (ref 12.3–15.4)
HCT VFR BLD AUTO: 41.5 % (ref 34–46.6)
HGB BLD-MCNC: 13.8 G/DL (ref 12–15.9)
MCH RBC QN AUTO: 31.4 PG (ref 26.6–33)
MCHC RBC AUTO-ENTMCNC: 33.3 G/DL (ref 31.5–35.7)
MCV RBC AUTO: 94.3 FL (ref 79–97)
PLATELET # BLD AUTO: 236 10*3/MM3 (ref 140–450)
PMV BLD AUTO: 8.6 FL (ref 6–12)
RBC # BLD AUTO: 4.4 10*6/MM3 (ref 3.77–5.28)
WBC NRBC COR # BLD AUTO: 8.38 10*3/MM3 (ref 3.4–10.8)

## 2025-03-07 PROCEDURE — 86480 TB TEST CELL IMMUN MEASURE: CPT

## 2025-03-07 PROCEDURE — 80050 GENERAL HEALTH PANEL: CPT

## 2025-03-07 PROCEDURE — 83036 HEMOGLOBIN GLYCOSYLATED A1C: CPT

## 2025-03-07 PROCEDURE — 36415 COLL VENOUS BLD VENIPUNCTURE: CPT

## 2025-03-07 PROCEDURE — 80061 LIPID PANEL: CPT

## 2025-03-07 RX ORDER — CLOBETASOL PROPIONATE 0.5 MG/G
OINTMENT TOPICAL 2 TIMES DAILY
Qty: 60 G | Refills: 1 | Status: SHIPPED | OUTPATIENT
Start: 2025-03-07

## 2025-03-07 RX ORDER — TRIAMCINOLONE ACETONIDE 1 MG/G
1 CREAM TOPICAL 2 TIMES DAILY
Qty: 80 G | Refills: 1 | Status: SHIPPED | OUTPATIENT
Start: 2025-03-07

## 2025-03-07 RX ORDER — OMEPRAZOLE 40 MG/1
40 CAPSULE, DELAYED RELEASE ORAL DAILY
Qty: 90 CAPSULE | Refills: 3 | Status: SHIPPED | OUTPATIENT
Start: 2025-03-07

## 2025-03-07 RX ORDER — RISANKIZUMAB-RZAA 150 MG/ML
150 INJECTION SUBCUTANEOUS
Qty: 1 ML | Refills: 0 | Status: SHIPPED | OUTPATIENT
Start: 2025-03-07

## 2025-03-07 RX ORDER — ACETAMINOPHEN 160 MG
2000 TABLET,DISINTEGRATING ORAL DAILY
Qty: 90 CAPSULE | Refills: 3 | Status: SHIPPED | OUTPATIENT
Start: 2025-03-07 | End: 2026-03-07

## 2025-03-07 NOTE — PATIENT INSTRUCTIONS
Diagnosis Discussed   Continue to monitor   Plenty of fluids, monitor diet and exercise   Labs ordered will notify of results   Immunizations up to date   Follow up with Dermatology   Follow up with GYN - Dr. Rendon   Mammogram ordered   Follow up with Pain management   Low lose CT lung screening- lung cancer screening   Take medications as instructed  Follow up as directed   If symptoms worsen or persist please seek further evaluation

## 2025-03-07 NOTE — PROGRESS NOTES
Office Note     Name: Sehrin Azul    : 1960     MRN: [id number]     Chief Complaint  Annual Exam and Dizziness    Subjective     History of Present Illness:  Sherin Azul is a 64 y.o. female who presents today for physical and concerns for dizziness   Dizzy spells x 8-9 months  Will last approx 5 min at a time   Every few days   Possible from not eating- when eating and drinking does feel better   Possibly not drinking enough water   No changes in medications     Following with Dr. Rendon- martinez scheduled     PMH-  HPL - has not been taking cholesterol medication   GERD   Anal fissure  Rectocele   Menopause   Hx of cervical dysplasia   Hx of breast cancer with radiation   Hx of Alcohol abuse   Arthritis   Psorasis   Eczema   Insomnia   Tobacco use   Hx of MRSA   Herpes   Rectocele      Meds-  Alclometasone 0.05%   Clobetasol 0.05%   Clobetasol shampoo  Triamcinolone 0.1%  Estradiol 0.1 vaginal cream   Hydrocodone 5/325mg 2-4 times per day   Prilosec 40mg daily   Pravastatin 10mg daily- not currently taking   Skirizi every 4 months - [facility]  Vitamin D supplement      Family Hx-   Maternal GM- - stroke   Maternal GF- - unsure   Paternal GM- - unsure   Paternal GF- - unsure   Mother- - breast cancer   Father- - stomach cancer   Sister- - ovarian cancer, breast cancer   Brother- - bladder cancer, bone cancer   Brother- - kidney cancer, bone cancer   Brother- alive- throat cancer, DM      Alcohol Quit- 7 years. 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 Follows with Dermatology regularly    will follow up      Dental/Eye exams up to date. Vision is stable. Glasses  no contacts   Diet/Exercise- Diet-Healthy moderate    Exercise- limited- on feet all day with work. Stays active with work      OBGYN   PAP- none   Mammo - will schedule  4/2025  DEXA- will schedule   BC/Hormone replacement - vaginal cream   Vitamin D -  supplement daily      Colonoscopy/Cologuard - stable. Every  5 years- next due 2026     Immunizations  Tdap- up to date   Flu- up to date   Shingles- will schedule   COVID- completed no boosters   PNA- will hold      Lung Screening-   Ordered today     Review of Systems:   Review of Systems   Constitutional:  Negative for chills and fever.   HENT:  Negative for dental problem, trouble swallowing and voice change.    Eyes:  Negative for visual disturbance.   Respiratory:  Negative for cough, chest tightness, shortness of breath and wheezing.    Cardiovascular:  Negative for chest pain, palpitations and leg swelling.   Gastrointestinal:  Negative for abdominal pain, blood in stool, constipation, diarrhea, nausea and vomiting.   Genitourinary:  Negative for dysuria.   Musculoskeletal:  Negative for gait problem.   Skin:  Positive for rash.   Neurological:  Positive for dizziness. Negative for light-headedness and headache.   Psychiatric/Behavioral:  Negative for depressed mood.        Past Medical History:   Past Medical History:   Diagnosis Date    Acrochordon     Acute maxillary sinusitis     Acute otitis media     Breast cancer 2005    left    Dermatitis     Drug therapy 2005    Eczema     H/O alcohol abuse     Herpes     History of breast cancer     History of cervical dysplasia     History of MRSA infection     Hx of radiation therapy 2005    Joint pain, hip     Joint pain, knee     Osteoarthritis     Psoriasis     Scabies     Tendonitis     Urinary tract infection     Varicose veins of legs     Viral syndrome     Weight gain        Past Surgical History:   Past Surgical History:   Procedure Laterality Date    BREAST BIOPSY Left 2005    BREAST LUMPECTOMY Left 2005    lumpectomy    LAPAROSCOPIC ASSISTED VAGINAL  HYSTERECTOMY SALPINGO OOPHORECTOMY Bilateral     OOPHORECTOMY         Immunizations:   Immunization History   Administered Date(s) Administered    COVID-19 (PFIZER) Purple Cap Monovalent 12/23/2020, 01/13/2021    Flu Vaccine Intradermal Quad 18-64YR 09/24/2019    Fluzone (or Fluarix & Flulaval for VFC) >6mos 10/16/2024    Hepatitis B Adult/Adolescent IM 12/21/1998, 01/21/1999, 07/08/1999    Influenza, Unspecified 10/10/2022    Td (TDVAX) 12/21/1998    Tdap 02/14/2020        Medications:     Current Outpatient Medications:     clobetasol (TEMOVATE) 0.05 % ointment, Apply  topically to the appropriate area as directed 2 (Two) Times a Day., Disp: 60 g, Rfl: 1    estradiol (ESTRACE) 0.1 MG/GM vaginal cream, Insert 1 g into the vagina 2 (Two) Times a Week., Disp: 42.5 g, Rfl: 11    HYDROcodone-acetaminophen (NORCO)  MG per tablet, Take 2 tablets by mouth Every 12 (Twelve) Hours As Needed., Disp: , Rfl:     omeprazole (priLOSEC) 40 MG capsule, Take 1 capsule by mouth Daily as directed for GERD, Disp: 90 capsule, Rfl: 3    Risankizumab-rzaa (Skyrizi Pen) 150 MG/ML solution auto-injector, Inject 150 mg under the skin into the appropriate area as directed Every 3 (Three) Months., Disp: 1 mL, Rfl: 0    triamcinolone (KENALOG) 0.1 % cream, Apply 1 Application topically to the appropriate area as directed 2 (Two) Times a Day., Disp: 80 g, Rfl: 1    Cholecalciferol (Vitamin D3) 50 MCG (2000 UT) capsule, Take 1 capsule by mouth Daily., Disp: 90 capsule, Rfl: 3    Allergies:   Allergies   Allergen Reactions    Meloxicam Nausea Only       Family History:   Family History   Problem Relation Age of Onset    Breast cancer Mother 40    Stomach cancer Father     Breast cancer Sister 43    Ovarian cancer Sister 40    Bone cancer Brother     Cancer Brother         bladder    Bone cancer Brother     Cancer Brother         kidney    Throat cancer Brother     Diabetes Brother     Stroke Maternal Grandmother     Breast cancer Maternal  "Aunt 45       Social History:   Social History     Socioeconomic History    Marital status:    Tobacco Use    Smoking status: Former     Average packs/day: 1 pack/day for 49.0 years (49.0 ttl pk-yrs)     Types: Cigarettes     Start date: 1975     Passive exposure: Current    Smokeless tobacco: Never   Vaping Use    Vaping status: Never Used   Substance and Sexual Activity    Alcohol use: No    Drug use: No    Sexual activity: Not Currently         Objective     Vital Signs  /70   Pulse 62   Resp 14   Ht 165.1 cm (65\")   Wt 72.6 kg (160 lb)   SpO2 96%   BMI 26.63 kg/m²   Estimated body mass index is 26.63 kg/m² as calculated from the following:    Height as of this encounter: 165.1 cm (65\").    Weight as of this encounter: 72.6 kg (160 lb).    BMI is >= 25 and <30. (Overweight) The following options were offered after discussion;: exercise counseling/recommendations and nutrition counseling/recommendations      Physical Exam  Vitals and nursing note reviewed.   Constitutional:       General: She is not in acute distress.     Appearance: Normal appearance.   HENT:      Head: Normocephalic and atraumatic.      Right Ear: Tympanic membrane normal.      Left Ear: Tympanic membrane normal.      Nose: Nose normal.      Mouth/Throat:      Mouth: Mucous membranes are moist.   Eyes:      Extraocular Movements: Extraocular movements intact.      Conjunctiva/sclera: Conjunctivae normal.      Pupils: Pupils are equal, round, and reactive to light.   Cardiovascular:      Rate and Rhythm: Normal rate and regular rhythm.      Heart sounds: Normal heart sounds.   Pulmonary:      Effort: Pulmonary effort is normal. No respiratory distress.      Breath sounds: Normal breath sounds.   Abdominal:      General: Bowel sounds are normal.      Palpations: Abdomen is soft.   Musculoskeletal:         General: Normal range of motion.      Cervical back: Normal range of motion.      Comments: Moving all extremities  "   Skin:     General: Skin is warm and dry.   Neurological:      General: No focal deficit present.      Mental Status: She is alert and oriented to person, place, and time.   Psychiatric:         Mood and Affect: Mood normal.         Behavior: Behavior normal.         Thought Content: Thought content normal.         Judgment: Judgment normal.          Procedures     Assessment and Plan   Diagnosis Discussed   Continue to monitor   Plenty of fluids, monitor diet and exercise   Labs ordered will notify of results   Immunizations up to date   Follow up with Dermatology   Follow up with GYN - Dr. Rendon   Mammogram ordered   Follow up with Pain management   Low lose CT lung screening- lung cancer screening   Take medications as instructed  Follow up as directed   If symptoms worsen or persist please seek further evaluation     1. Wellness examination    2. Gastroesophageal reflux disease, unspecified whether esophagitis present  - omeprazole (priLOSEC) 40 MG capsule; Take 1 capsule by mouth Daily as directed for GERD  Dispense: 90 capsule; Refill: 3    3. Psoriasis  - clobetasol (TEMOVATE) 0.05 % ointment; Apply  topically to the appropriate area as directed 2 (Two) Times a Day.  Dispense: 60 g; Refill: 1    4. Eczema, unspecified type  - triamcinolone (KENALOG) 0.1 % cream; Apply 1 Application topically to the appropriate area as directed 2 (Two) Times a Day.  Dispense: 80 g; Refill: 1    5. Guttate psoriasis  - Risankizumab-rzaa (Skyrizi Pen) 150 MG/ML solution auto-injector; Inject 150 mg under the skin into the appropriate area as directed Every 3 (Three) Months.  Dispense: 1 mL; Refill: 0    6. Psoriatic arthropathy  - Risankizumab-rzaa (Skyrizi Pen) 150 MG/ML solution auto-injector; Inject 150 mg under the skin into the appropriate area as directed Every 3 (Three) Months.  Dispense: 1 mL; Refill: 0    7. Multiple atypical skin moles  - Ambulatory Referral to Dermatology    8. Visit for screening mammogram  -  Mammo Screening Digital Tomosynthesis Bilateral With CAD; Future    9. Screening for lung cancer  -  CT Chest Low Dose Cancer Screening WO; Future    10. Screening for endocrine, nutritional, metabolic and immunity disorder  - CBC (No Diff); Future  - Comprehensive Metabolic Panel; Future  - Hemoglobin A1c; Future  - TSH Rfx On Abnormal To Free T4; Future    11. Screening, lipid  - Lipid Panel; Future    12. Vitamin D deficiency  - Cholecalciferol (Vitamin D3) 50 MCG (2000 UT) capsule; Take 1 capsule by mouth Daily.  Dispense: 90 capsule; Refill: 3       Follow Up  Return in about 1 year (around 3/7/2026), or if symptoms worsen or fail to improve, for Annual, fasting labs.    Saira Thompson MD  [facility] INTERNAL MEDICINE  [address] [place] [id number] [phone]

## 2025-03-08 LAB
ALBUMIN SERPL-MCNC: 4.2 G/DL (ref 3.5–5.2)
ALBUMIN/GLOB SERPL: 1.3 G/DL
ALP SERPL-CCNC: 91 U/L (ref 39–117)
ALT SERPL W P-5'-P-CCNC: 16 U/L (ref 1–33)
ANION GAP SERPL CALCULATED.3IONS-SCNC: 9.1 MMOL/L (ref 5–15)
AST SERPL-CCNC: 25 U/L (ref 1–32)
BILIRUB SERPL-MCNC: 0.2 MG/DL (ref 0–1.2)
BUN SERPL-MCNC: 10 MG/DL (ref 8–23)
BUN/CREAT SERPL: 15.6 (ref 7–25)
CALCIUM SPEC-SCNC: 9.4 MG/DL (ref 8.6–10.5)
CHLORIDE SERPL-SCNC: 102 MMOL/L (ref 98–107)
CHOLEST SERPL-MCNC: 204 MG/DL (ref 0–200)
CO2 SERPL-SCNC: 26.9 MMOL/L (ref 22–29)
CREAT SERPL-MCNC: 0.64 MG/DL (ref 0.57–1)
EGFRCR SERPLBLD CKD-EPI 2021: 100.1 ML/MIN/1.73
GLOBULIN UR ELPH-MCNC: 3.2 GM/DL
GLUCOSE SERPL-MCNC: 79 MG/DL (ref 65–99)
HBA1C MFR BLD: 5.4 % (ref 4.8–5.6)
HDLC SERPL-MCNC: 49 MG/DL (ref 40–60)
LDLC SERPL CALC-MCNC: 122 MG/DL (ref 0–100)
LDLC/HDLC SERPL: 2.39 {RATIO}
POTASSIUM SERPL-SCNC: 4.5 MMOL/L (ref 3.5–5.2)
PROT SERPL-MCNC: 7.4 G/DL (ref 6–8.5)
SODIUM SERPL-SCNC: 138 MMOL/L (ref 136–145)
TRIGL SERPL-MCNC: 190 MG/DL (ref 0–150)
TSH SERPL DL<=0.05 MIU/L-ACNC: 3.04 UIU/ML (ref 0.27–4.2)
VLDLC SERPL-MCNC: 33 MG/DL (ref 5–40)

## 2025-03-11 LAB
GAMMA INTERFERON BACKGROUND BLD IA-ACNC: 0.04 IU/ML
M TB IFN-G BLD-IMP: NEGATIVE
M TB IFN-G CD4+ BCKGRND COR BLD-ACNC: 0.09 IU/ML
M TB IFN-G CD4+CD8+ BCKGRND COR BLD-ACNC: 0.06 IU/ML
MITOGEN IGNF BCKGRD COR BLD-ACNC: >10 IU/ML
QUANTIFERON INCUBATION: NORMAL
SERVICE CMNT-IMP: NORMAL

## 2025-03-18 ENCOUNTER — RESULTS FOLLOW-UP (OUTPATIENT)
Dept: INTERNAL MEDICINE | Facility: CLINIC | Age: 65
End: 2025-03-18

## 2025-03-18 DIAGNOSIS — E78.5 DYSLIPIDEMIA: Primary | ICD-10-CM

## 2025-03-18 RX ORDER — ROSUVASTATIN CALCIUM 10 MG/1
10 TABLET, COATED ORAL DAILY
Qty: 90 TABLET | Refills: 3 | Status: SHIPPED | OUTPATIENT
Start: 2025-03-18

## 2025-04-16 ENCOUNTER — SPECIALTY PHARMACY (OUTPATIENT)
Dept: PHARMACY | Facility: TELEHEALTH | Age: 65
End: 2025-04-16
Payer: COMMERCIAL

## 2025-04-16 DIAGNOSIS — L40.50 PSORIATIC ARTHROPATHY: ICD-10-CM

## 2025-04-16 DIAGNOSIS — L40.4 GUTTATE PSORIASIS: ICD-10-CM

## 2025-04-16 RX ORDER — RISANKIZUMAB-RZAA 150 MG/ML
150 INJECTION SUBCUTANEOUS
Qty: 1 ML | Refills: 0 | Status: SHIPPED | OUTPATIENT
Start: 2025-04-16

## 2025-04-16 RX ORDER — RISANKIZUMAB-RZAA 150 MG/ML
150 INJECTION SUBCUTANEOUS
Qty: 1 ML | Refills: 0 | Status: CANCELLED | OUTPATIENT
Start: 2025-04-16

## 2025-04-16 NOTE — TELEPHONE ENCOUNTER
Caller: [facility] ([facility])    Relationship: Pharmacy    Best call back number: [phone]     Requested Prescriptions:   Requested Prescriptions     Pending Prescriptions Disp Refills    Risankizumab-rzaa (Skyrizi Pen) 150 MG/ML solution auto-injector 1 mL 0     Sig: Inject 150 mg under the skin into the appropriate area as directed Every 3 (Three) Months.        Pharmacy where request should be sent: [facility] ([facility])     Last office visit with prescribing clinician: 3/7/2025   Last telemedicine visit with prescribing clinician: Visit date not found   Next office visit with prescribing clinician: 4/3/2026     Additional details provided by patient:     Does the patient have less than a 3 day supply:  [] Yes  [x] No    Would you like a call back once the refill request has been completed: [] Yes [x] No    If the office needs to give you a call back, can they leave a voicemail: [] Yes [x] No    Jose Espinoza Rep   04/16/25 09:21 EDT

## 2025-04-18 ENCOUNTER — SPECIALTY PHARMACY (OUTPATIENT)
Dept: PHARMACY | Facility: TELEHEALTH | Age: 65
End: 2025-04-18
Payer: COMMERCIAL

## 2025-04-21 ENCOUNTER — HOSPITAL ENCOUNTER (OUTPATIENT)
Dept: MAMMOGRAPHY | Facility: HOSPITAL | Age: 65
Discharge: HOME | End: 2025-04-21
Payer: COMMERCIAL

## 2025-04-21 ENCOUNTER — HOSPITAL ENCOUNTER (OUTPATIENT)
Dept: CT IMAGING | Facility: HOSPITAL | Age: 65
Discharge: HOME | End: 2025-04-21
Payer: COMMERCIAL

## 2025-04-21 DIAGNOSIS — Z12.31 VISIT FOR SCREENING MAMMOGRAM: ICD-10-CM

## 2025-04-21 DIAGNOSIS — Z12.2 SCREENING FOR LUNG CANCER: ICD-10-CM

## 2025-04-21 PROCEDURE — 77063 BREAST TOMOSYNTHESIS BI: CPT

## 2025-04-21 PROCEDURE — 71271 CT THORAX LUNG CANCER SCR C-: CPT

## 2025-04-21 PROCEDURE — 77067 SCR MAMMO BI INCL CAD: CPT

## 2025-04-22 PROCEDURE — 77063 BREAST TOMOSYNTHESIS BI: CPT | Performed by: RADIOLOGY

## 2025-04-22 PROCEDURE — 77067 SCR MAMMO BI INCL CAD: CPT | Performed by: RADIOLOGY

## 2025-04-29 NOTE — TELEPHONE ENCOUNTER
Patient was returning a call to the office about results. She would like a call back when possible.     Call back: [phone]

## 2025-04-29 NOTE — TELEPHONE ENCOUNTER
"Called and spoke to pt. Gave message from provider. Pt voiced understanding and appreciation.   Pt would like to proceed with 6m scan for followup. Would like to be notified \"when and where to be.\"   Pt voiced sincere appreciation for provider's care.   "

## 2025-05-07 DIAGNOSIS — R93.89 ABNORMAL CT OF THE CHEST: Primary | ICD-10-CM

## 2025-05-07 DIAGNOSIS — Z12.2 SCREENING FOR LUNG CANCER: ICD-10-CM

## 2025-05-07 DIAGNOSIS — F17.200 CURRENT EVERY DAY SMOKER: ICD-10-CM

## 2025-05-09 ENCOUNTER — APPOINTMENT (OUTPATIENT)
Dept: GENERAL RADIOLOGY | Facility: HOSPITAL | Age: 65
End: 2025-05-09
Payer: COMMERCIAL

## 2025-05-09 ENCOUNTER — TELEPHONE (OUTPATIENT)
Dept: INTERNAL MEDICINE | Facility: CLINIC | Age: 65
End: 2025-05-09
Payer: COMMERCIAL

## 2025-05-09 ENCOUNTER — HOSPITAL ENCOUNTER (EMERGENCY)
Facility: HOSPITAL | Age: 65
Discharge: HOME | End: 2025-05-09
Attending: EMERGENCY MEDICINE
Payer: COMMERCIAL

## 2025-05-09 VITALS
RESPIRATION RATE: 16 BRPM | HEIGHT: 65 IN | HEART RATE: 68 BPM | DIASTOLIC BLOOD PRESSURE: 55 MMHG | OXYGEN SATURATION: 98 % | BODY MASS INDEX: 25.83 KG/M2 | SYSTOLIC BLOOD PRESSURE: 116 MMHG | TEMPERATURE: 98.2 F | WEIGHT: 155 LBS

## 2025-05-09 DIAGNOSIS — R20.2 PARESTHESIAS: ICD-10-CM

## 2025-05-09 DIAGNOSIS — R07.9 CHEST PAIN, UNSPECIFIED TYPE: Primary | ICD-10-CM

## 2025-05-09 LAB
ALBUMIN SERPL-MCNC: 4.3 G/DL (ref 3.5–5.2)
ALBUMIN/GLOB SERPL: 1.4 G/DL
ALP SERPL-CCNC: 72 U/L (ref 39–117)
ALT SERPL W P-5'-P-CCNC: 18 U/L (ref 1–33)
ANION GAP SERPL CALCULATED.3IONS-SCNC: 11 MMOL/L (ref 5–15)
AST SERPL-CCNC: 21 U/L (ref 1–32)
BASOPHILS # BLD AUTO: 0.03 10*3/MM3 (ref 0–0.2)
BASOPHILS NFR BLD AUTO: 0.3 % (ref 0–1.5)
BILIRUB SERPL-MCNC: 0.5 MG/DL (ref 0–1.2)
BUN SERPL-MCNC: 12 MG/DL (ref 8–23)
BUN/CREAT SERPL: 18.2 (ref 7–25)
CALCIUM SPEC-SCNC: 9.5 MG/DL (ref 8.6–10.5)
CHLORIDE SERPL-SCNC: 104 MMOL/L (ref 98–107)
CO2 SERPL-SCNC: 25 MMOL/L (ref 22–29)
CREAT SERPL-MCNC: 0.66 MG/DL (ref 0.57–1)
DEPRECATED RDW RBC AUTO: 42.5 FL (ref 37–54)
EGFRCR SERPLBLD CKD-EPI 2021: 98.7 ML/MIN/1.73
EOSINOPHIL # BLD AUTO: 0.19 10*3/MM3 (ref 0–0.4)
EOSINOPHIL NFR BLD AUTO: 1.9 % (ref 0.3–6.2)
ERYTHROCYTE [DISTWIDTH] IN BLOOD BY AUTOMATED COUNT: 12.3 % (ref 12.3–15.4)
GEN 5 1HR TROPONIN T REFLEX: <6 NG/L
GLOBULIN UR ELPH-MCNC: 3 GM/DL
GLUCOSE SERPL-MCNC: 93 MG/DL (ref 65–99)
HCT VFR BLD AUTO: 42.9 % (ref 34–46.6)
HGB BLD-MCNC: 14.2 G/DL (ref 12–15.9)
HOLD SPECIMEN: NORMAL
IMM GRANULOCYTES # BLD AUTO: 0.04 10*3/MM3 (ref 0–0.05)
IMM GRANULOCYTES NFR BLD AUTO: 0.4 % (ref 0–0.5)
LIPASE SERPL-CCNC: 18 U/L (ref 13–60)
LYMPHOCYTES # BLD AUTO: 2.78 10*3/MM3 (ref 0.7–3.1)
LYMPHOCYTES NFR BLD AUTO: 27.2 % (ref 19.6–45.3)
MCH RBC QN AUTO: 31 PG (ref 26.6–33)
MCHC RBC AUTO-ENTMCNC: 33.1 G/DL (ref 31.5–35.7)
MCV RBC AUTO: 93.7 FL (ref 79–97)
MONOCYTES # BLD AUTO: 0.73 10*3/MM3 (ref 0.1–0.9)
MONOCYTES NFR BLD AUTO: 7.1 % (ref 5–12)
NEUTROPHILS NFR BLD AUTO: 6.45 10*3/MM3 (ref 1.7–7)
NEUTROPHILS NFR BLD AUTO: 63.1 % (ref 42.7–76)
NRBC BLD AUTO-RTO: 0 /100 WBC (ref 0–0.2)
NT-PROBNP SERPL-MCNC: <36 PG/ML (ref 0–900)
PLATELET # BLD AUTO: 208 10*3/MM3 (ref 140–450)
PMV BLD AUTO: 8.2 FL (ref 6–12)
POTASSIUM SERPL-SCNC: 4.2 MMOL/L (ref 3.5–5.2)
PROT SERPL-MCNC: 7.3 G/DL (ref 6–8.5)
RBC # BLD AUTO: 4.58 10*6/MM3 (ref 3.77–5.28)
SODIUM SERPL-SCNC: 140 MMOL/L (ref 136–145)
TROPONIN T NUMERIC DELTA: NORMAL
TROPONIN T SERPL HS-MCNC: <6 NG/L
WBC NRBC COR # BLD AUTO: 10.22 10*3/MM3 (ref 3.4–10.8)
WHOLE BLOOD HOLD COAG: NORMAL
WHOLE BLOOD HOLD SPECIMEN: NORMAL

## 2025-05-09 PROCEDURE — 85025 COMPLETE CBC W/AUTO DIFF WBC: CPT | Performed by: EMERGENCY MEDICINE

## 2025-05-09 PROCEDURE — 36415 COLL VENOUS BLD VENIPUNCTURE: CPT

## 2025-05-09 PROCEDURE — 83880 ASSAY OF NATRIURETIC PEPTIDE: CPT | Performed by: EMERGENCY MEDICINE

## 2025-05-09 PROCEDURE — 83690 ASSAY OF LIPASE: CPT | Performed by: EMERGENCY MEDICINE

## 2025-05-09 PROCEDURE — 71045 X-RAY EXAM CHEST 1 VIEW: CPT

## 2025-05-09 PROCEDURE — 84484 ASSAY OF TROPONIN QUANT: CPT | Performed by: EMERGENCY MEDICINE

## 2025-05-09 PROCEDURE — 93005 ELECTROCARDIOGRAM TRACING: CPT | Performed by: EMERGENCY MEDICINE

## 2025-05-09 PROCEDURE — 99284 EMERGENCY DEPT VISIT MOD MDM: CPT

## 2025-05-09 PROCEDURE — 80053 COMPREHEN METABOLIC PANEL: CPT | Performed by: EMERGENCY MEDICINE

## 2025-05-09 PROCEDURE — 93005 ELECTROCARDIOGRAM TRACING: CPT

## 2025-05-09 RX ORDER — SODIUM CHLORIDE 0.9 % (FLUSH) 0.9 %
10 SYRINGE (ML) INJECTION AS NEEDED
Status: DISCONTINUED | OUTPATIENT
Start: 2025-05-09 | End: 2025-05-09 | Stop reason: HOSPADM

## 2025-05-09 RX ORDER — ASPIRIN 81 MG/1
324 TABLET, CHEWABLE ORAL ONCE
Status: DISCONTINUED | OUTPATIENT
Start: 2025-05-09 | End: 2025-05-09 | Stop reason: HOSPADM

## 2025-05-09 NOTE — Clinical Note
[facility] EMERGENCY DEPARTMENT  [address]  [facility] [address]  Phone: [phone]    Sherin Azul was seen and treated in our emergency department on 5/9/2025.  She may return to work on 05/13/2025.         Thank you for choosing [facility].    Helene Casillas MD

## 2025-05-09 NOTE — ED PROVIDER NOTES
Subjective   History of Present Illness  65-year-old female who presents with a complaint of chest pain.  The patient reports that for the last week she has pain in her left scapular region that radiates around to the left anterior chest.  There is also radiation into the left shoulder and arm.  No history of coronary artery disease.  However she does have a history of smoking, states that she will last quit 9 months ago, and a history of high cholesterol.  She denies high blood pressure or diabetes.  She is unsure if she has a family history of coronary disease in a first-degree relative as she had a brother who passed away in his 40s but she is unsure what he  from.  She denies fever.  No trauma or injury to her chest or back.  She also reports some tingling to the bilateral arms and feet.  She also states that she feels like her arms and feet will swell whenever she lays down flat at night.  She also states that she feels like she has periodic episodes of shallow breathing at night.  She discussed these symptoms with her doctor and was advised to present here to the ER.  She is awake and alert and nontoxic in appearance.      Review of Systems   Constitutional:  Negative for chills, fatigue and fever.   HENT:  Negative for congestion, ear pain, postnasal drip, sinus pressure and sore throat.    Eyes:  Negative for pain, redness and visual disturbance.   Respiratory:  Negative for cough, chest tightness and shortness of breath.    Cardiovascular:  Positive for chest pain and leg swelling. Negative for palpitations.   Gastrointestinal:  Negative for abdominal pain, anal bleeding, blood in stool, diarrhea, nausea and vomiting.   Endocrine: Negative for polydipsia and polyuria.   Genitourinary:  Negative for difficulty urinating, dysuria, frequency and urgency.   Musculoskeletal:  Negative for arthralgias, back pain and neck pain.   Skin:  Negative for pallor and rash.   Allergic/Immunologic: Negative for  environmental allergies and immunocompromised state.   Neurological:  Negative for dizziness, weakness and headaches.   Hematological:  Negative for adenopathy.   Psychiatric/Behavioral:  Negative for confusion, self-injury and suicidal ideas. The patient is nervous/anxious.    All other systems reviewed and are negative.      Past Medical History:   Diagnosis Date    Acrochordon     Acute maxillary sinusitis     Acute otitis media     Breast cancer 2005    left    Dermatitis     Drug therapy 2005    Eczema     H/O alcohol abuse     Herpes     History of breast cancer     History of cervical dysplasia     History of MRSA infection     Hx of radiation therapy 2005    Joint pain, hip     Joint pain, knee     Osteoarthritis     Psoriasis     Scabies     Tendonitis     Urinary tract infection     Varicose veins of legs     Viral syndrome     Weight gain        Allergies   Allergen Reactions    Meloxicam Nausea Only       Past Surgical History:   Procedure Laterality Date    BREAST BIOPSY Left 2005    BREAST LUMPECTOMY Left 2005    lumpectomy    LAPAROSCOPIC ASSISTED VAGINAL HYSTERECTOMY SALPINGO OOPHORECTOMY Bilateral     OOPHORECTOMY         Family History   Problem Relation Age of Onset    Breast cancer Mother 40    Stomach cancer Father     Breast cancer Sister 43    Ovarian cancer Sister 40    Bone cancer Brother     Cancer Brother         bladder    Bone cancer Brother     Cancer Brother         kidney    Throat cancer Brother     Diabetes Brother     Stroke Maternal Grandmother     Breast cancer Maternal Aunt 45       Social History     Socioeconomic History    Marital status:    Tobacco Use    Smoking status: Former     Average packs/day: 1 pack/day for 49.0 years (49.0 ttl pk-yrs)     Types: Cigarettes     Start date: 1975     Passive exposure: Current    Smokeless tobacco: Never   Vaping Use    Vaping status: Never Used   Substance and Sexual Activity    Alcohol use: No    Drug use: No    Sexual  activity: Not Currently           Objective   Physical Exam  Vitals and nursing note reviewed.   Constitutional:       General: She is not in acute distress.     Appearance: Normal appearance. She is well-developed. She is not toxic-appearing or diaphoretic.   HENT:      Head: Normocephalic and atraumatic.      Right Ear: External ear normal.      Left Ear: External ear normal.      Nose: Nose normal.   Eyes:      General: Lids are normal.      Pupils: Pupils are equal, round, and reactive to light.   Neck:      Trachea: No tracheal deviation.   Cardiovascular:      Rate and Rhythm: Normal rate and regular rhythm.      Pulses: No decreased pulses.      Heart sounds: Normal heart sounds. No murmur heard.     No friction rub. No gallop.   Pulmonary:      Effort: Pulmonary effort is normal. No respiratory distress.      Breath sounds: Normal breath sounds. No decreased breath sounds, wheezing, rhonchi or rales.   Abdominal:      General: Bowel sounds are normal.      Palpations: Abdomen is soft.      Tenderness: There is no abdominal tenderness. There is no guarding or rebound.   Musculoskeletal:         General: No deformity. Normal range of motion.      Cervical back: Normal range of motion and neck supple.   Lymphadenopathy:      Cervical: No cervical adenopathy.   Skin:     General: Skin is warm and dry.      Findings: No rash.   Neurological:      Mental Status: She is alert and oriented to person, place, and time.      Cranial Nerves: No cranial nerve deficit.      Sensory: No sensory deficit.   Psychiatric:         Speech: Speech normal.         Behavior: Behavior normal.         Thought Content: Thought content normal.         Judgment: Judgment normal.         Procedures           ED Course  ED Course as of 05/10/25 0824   Fri May 09, 2025   1409 EKG performed 5/9/2025 at 1320 interpreted by me shows sinus rhythm, normal QTc, normal QRS, no acute ischemic changes. [NS]      ED Course User Index  [NS] Vinny  Helene Hsu MD                  HEART Score: 2   Shared Decision Making  I discussed the findings with the patient/patient representative who is in agreement with the treatment plan and the final disposition.  Risks and benefits of discharge and/or observation/admission were discussed: Yes                                      Medical Decision Making  Differential includes pleurisy, chest wall pain, pneumonia, acute coronary syndrome, dysrhythmia, other unspecified etiology.    Troponin x 2 is normal.  Normal lipase.  Normal BNP.  Normal kidney function electrolytes.  Normal white count and H&H.    Chest x-ray interpreted by me shows normal heart size and clear lungs.    The patient has remained well-appearing throughout the ER course.  She will be discharged with a referral to the heart valve clinic.  She is chest pain-free at time of discharge.    Problems Addressed:  Chest pain, unspecified type: complicated acute illness or injury with systemic symptoms  Paresthesias: complicated acute illness or injury with systemic symptoms    Amount and/or Complexity of Data Reviewed  External Data Reviewed: labs, radiology and ECG.  Labs: ordered. Decision-making details documented in ED Course.  Radiology: ordered and independent interpretation performed. Decision-making details documented in ED Course.  ECG/medicine tests: ordered and independent interpretation performed. Decision-making details documented in ED Course.    Risk  OTC drugs.  Prescription drug management.        Final diagnoses:   Chest pain, unspecified type   Paresthesias       ED Disposition  ED Disposition       ED Disposition   Discharge    Condition   Stable    Comment   --               [facility]  [address]  [facility] [id number] [phone]  Schedule an appointment as soon as possible for a visit            Medication List      No changes were made to your prescriptions during this visit.             Helene Casillas MD  05/10/25 0823

## 2025-05-09 NOTE — TELEPHONE ENCOUNTER
"Pt called the office to report that she had a lung scan w/ moderate coronary calcification. Pt stated that she hasn't felt well the past few months. Pt rptd:   Swelling in hands and feet  Irregular beats \"heart skipping a beat\"   Zero energy   Pain in shoulder and chest   Difficulty breathing at night, wake up trying to catch breath    At present pt reported that her   Chest feels weak and wobbly   Dizzy spells   Foggy Brain    Pt recommended to go to ER   Pt stated she will    Pt also wanted to apt with PCP as followup. Pt notified that PCP on holiday. Pt wanted first available afternoon apt on PCP return. Pt scheduled for 5/15    "

## 2025-05-09 NOTE — DISCHARGE INSTRUCTIONS
Keep outpatient follow-up with the heart valve clinic.    Take Tylenol or ibuprofen as needed for pain.    Return to the ER with any further concern.

## 2025-05-11 LAB
QT INTERVAL: 392 MS
QT INTERVAL: 396 MS
QTC INTERVAL: 404 MS
QTC INTERVAL: 405 MS

## 2025-05-12 ENCOUNTER — TELEPHONE (OUTPATIENT)
Dept: CARDIOLOGY | Facility: HOSPITAL | Age: 65
End: 2025-05-12
Payer: COMMERCIAL

## 2025-05-12 NOTE — TELEPHONE ENCOUNTER
Call X 1. Patient Was Referred By [facility] CP 05/09/2025.Called Patient 05/12/2025. No Same Day Or Next Day Appointments Available. Patient Schedule 05/14/2025.

## 2025-05-14 ENCOUNTER — OFFICE VISIT (OUTPATIENT)
Dept: CARDIOLOGY | Facility: HOSPITAL | Age: 65
End: 2025-05-14
Payer: COMMERCIAL

## 2025-05-14 VITALS
WEIGHT: 161.5 LBS | RESPIRATION RATE: 18 BRPM | OXYGEN SATURATION: 95 % | SYSTOLIC BLOOD PRESSURE: 112 MMHG | DIASTOLIC BLOOD PRESSURE: 63 MMHG | BODY MASS INDEX: 26.91 KG/M2 | HEART RATE: 72 BPM | HEIGHT: 65 IN

## 2025-05-14 DIAGNOSIS — I25.119 CORONARY ARTERY DISEASE INVOLVING NATIVE CORONARY ARTERY OF NATIVE HEART WITH ANGINA PECTORIS: ICD-10-CM

## 2025-05-14 DIAGNOSIS — R07.89 OTHER CHEST PAIN: Primary | ICD-10-CM

## 2025-05-14 DIAGNOSIS — R06.09 DYSPNEA ON EXERTION: ICD-10-CM

## 2025-05-14 DIAGNOSIS — E78.5 DYSLIPIDEMIA: ICD-10-CM

## 2025-05-14 NOTE — PROGRESS NOTES
Chief Complaint  Chest Pain    Subjective      History of Present Illness {  Problem List  Visit  Diagnosis   Encounters  Notes  Medications  Labs  Result Review Imaging  Media :23}     Sherin Azul, 65 y.o. female with past medical history significant for tobacco use, hyperlipidemia, GERD, osteoarthritis, breast cancer, alcohol use, and MRSA, who presents to [facility] for Chest Pain  Patient presented to [facility] ED on 5/9/2025 with a chief complaint of chest pain.  Patient endorsed pain in her left scapula with radiation into her left anterior chest over the past week.  There is also radiation of pain into her left arm.  Patient also endorsed feeling like her arms and feet will swell when she lays down flat at night.  Twelve-lead EKG while in the ED showed normal sinus rhythm, rate 64, normal EKG.  Chest x-ray showed no acute cardiopulmonary process.  High-sensitivity troponin x 2 noted to be unremarkable.  Patient was discharged with instructions to take Tylenol, and ibuprofen as needed, and follow-up with heart and valve.    At time of today's evaluation, patient denies additional episodes of exertional chest pain or pressure.  She does continue to have right neck, shoulder, and back pain.  Patient does endorse recent and worsening dyspnea on exertion.  She denies syncope, but does endorse some episodes of dizziness.  Patient has been experiencing rare episodes of palpitations occurring while at rest.  She does note some mild lower extremity edema which typically occurs after a long day at work.  Patient does not routinely monitor blood pressure or heart rate while at home.      Activity level: Active at work, otherwise no routine exercise  Caffeine intake: 3 cups of coffee daily, and excedrin in afternoon  Water intake: indaquate  Nicotine: quit 8 months prior, was 1-1.5 PPD  Family history: Negative for early onset coronary artery disease in an  "immediate family member    Of note, patient underwent a CT scan of the chest for lung cancer screening on 4/21/2025 with noted moderately extensive coronary artery calcification.  Low-dose CT chest scans have noted coronary calcifications dating back to 2020.      Objective     Vital Signs:   Vitals:    05/14/25 1448 05/14/25 1449   BP: 113/66 112/63   BP Location: Left arm Left arm   Patient Position: Standing Sitting   Cuff Size: Adult Adult   Pulse: 78 72   Resp:  18   SpO2: 93% 95%   Weight:  73.3 kg (161 lb 8 oz)   Height:  165.1 cm (65\")     Body mass index is 26.88 kg/m².  Physical Exam  Vitals and nursing note reviewed.   Constitutional:       Appearance: Normal appearance.   HENT:      Head: Normocephalic.   Eyes:      Pupils: Pupils are equal, round, and reactive to light.   Cardiovascular:      Rate and Rhythm: Normal rate and regular rhythm.      Pulses: Normal pulses.      Heart sounds: Normal heart sounds. No murmur heard.  Pulmonary:      Effort: Pulmonary effort is normal.      Breath sounds: Normal breath sounds.   Abdominal:      General: Bowel sounds are normal.      Palpations: Abdomen is soft.   Musculoskeletal:         General: Normal range of motion.      Cervical back: Normal range of motion.      Right lower leg: No edema.      Left lower leg: No edema.   Skin:     General: Skin is warm and dry.      Capillary Refill: Capillary refill takes less than 2 seconds.   Neurological:      Mental Status: She is alert and oriented to person, place, and time.   Psychiatric:         Mood and Affect: Mood normal.         Thought Content: Thought content normal.                Data Reviewed:{ Labs  Result Review  Imaging  Med Tab  Media :23}     Lab Results   Component Value Date    WBC 10.22 05/09/2025    HGB 14.2 05/09/2025    HCT 42.9 05/09/2025    MCV 93.7 05/09/2025     05/09/2025      Lab Results   Component Value Date    GLUCOSE 93 05/09/2025    BUN 12 05/09/2025    CREATININE 0.66 " 05/09/2025     05/09/2025    K 4.2 05/09/2025     05/09/2025    CALCIUM 9.5 05/09/2025    PROTEINTOT 7.3 05/09/2025    ALBUMIN 4.3 05/09/2025    ALT 18 05/09/2025    AST 21 05/09/2025    ALKPHOS 72 05/09/2025    BILITOT 0.5 05/09/2025    GLOB 3.0 05/09/2025    AGRATIO 1.4 05/09/2025    BCR 18.2 05/09/2025    ANIONGAP 11.0 05/09/2025    EGFR 98.7 05/09/2025      Lab Results   Component Value Date    CHOL 204 (H) 03/07/2025    TRIG 190 (H) 03/07/2025    HDL 49 03/07/2025     (H) 03/07/2025      Lab Results   Component Value Date    CKTOTAL 224 (H) 05/16/2022    TROPONINT <6 05/09/2025    CT Chest Low Dose Cancer Screening WO (04/21/2025 09:42)  XR Chest 1 View (05/09/2025 13:10)  ECG 12 Lead Chest Pain (05/09/2025 12:11)  ECG 12 Lead Chest Pain (05/09/2025 13:20)    Assessment & Plan   Assessment and Plan {CC Problem List  Visit Diagnosis  ROS  Review (Popup)  Health Maintenance  Quality  BestPractice  Medications  SmartSets  SnapShot Encounters  Media :23}     1. Other chest pain  -Overall, recent episodes of chest discomfort have been described as atypical in nature.  -We have reviewed and discussed most recent ED evaluation including EKGs, chest x-ray, and laboratory results  -We have also reviewed and discussed most recent CT scans of the chest which have been noting coronary calcifications dating back to 2020  -Due to ongoing and worsening symptoms, we will proceed with ischemic evaluation  -Patient undergo nuclear medicine stress testing to assess for ischemic cause of patient's symptoms and patient with known coronary artery disease  -Will also obtain echocardiogram to rule out structural or functional abnormalities which could also be contributing to patient's symptoms  - Stress Test With Myocardial Perfusion (1 Day); Future  - Adult Transthoracic Echo Complete W/ Cont if Necessary Per Protocol; Future    2. Dyspnea on exertion  -While dyspnea could be secondary to deconditioning  "and prior tobacco use, in combination with chest discomfort would consider this as an anginal equivalent requiring further evaluation  -As mentioned above, plan for patient undergo nuclear medicine stress testing, and echocardiogram  - Stress Test With Myocardial Perfusion (1 Day); Future  - Adult Transthoracic Echo Complete W/ Cont if Necessary Per Protocol; Future    3. Dyslipidemia  -Lipid panel currently managed by primary care  -Recommended continue rosuvastatin 10 mg daily  - Stress Test With Myocardial Perfusion (1 Day); Future  The 10-year ASCVD risk score (Ana Lilia LOREDO, et al., 2019) is: 3.8%    Values used to calculate the score:      Age: 65 years      Sex: Female      Is Non- : No      Diabetic: No      Tobacco smoker: No      Systolic Blood Pressure: 112 mmHg      Is BP treated: No      HDL Cholesterol: 49 mg/dL      Total Cholesterol: 204 mg/dL       4.  Coronary artery disease  - As previously mentioned, patient has had noted coronary artery calcifications dating back to 2020.  Most recent CT scan of the chest notes \"moderately extensive coronary artery calcification\"  - Recommend patient to continue rosuvastatin  - Also as mentioned above, plan for patient undergo nuclear medicine stress testing, and echocardiogram      Will follow closely with patient in approximately 3 weeks to discuss results of above-mentioned testing.  Further recommendations to be made at that time.    Follow Up {Instructions Charge Capture  Follow-up Communications :23}     Return in about 3 weeks (around 6/4/2025) for Chest pain, Result review.  .    Patient was given instructions and counseling regarding her condition or for health maintenance advice. Please see specific information pulled into the AVS if appropriate. Patient was instructed to call the [facility] with any questions, concerns, or worsening symptoms.    Dictated Utilizing Dragon Dictation   Please note that portions of this " note were completed with a voice recognition program. Part of this note may be an electronic transcription/translation of spoken language to printed text using the Dragon Dictation System.

## 2025-05-23 ENCOUNTER — HOSPITAL ENCOUNTER (OUTPATIENT)
Dept: CARDIOLOGY | Facility: HOSPITAL | Age: 65
Discharge: HOME | End: 2025-05-23
Payer: COMMERCIAL

## 2025-05-23 ENCOUNTER — RESULTS FOLLOW-UP (OUTPATIENT)
Dept: CARDIOLOGY | Facility: HOSPITAL | Age: 65
End: 2025-05-23
Payer: COMMERCIAL

## 2025-05-23 VITALS — HEIGHT: 65 IN | WEIGHT: 161 LBS | BODY MASS INDEX: 26.82 KG/M2

## 2025-05-23 VITALS
HEIGHT: 65 IN | BODY MASS INDEX: 26.81 KG/M2 | SYSTOLIC BLOOD PRESSURE: 124 MMHG | WEIGHT: 160.94 LBS | DIASTOLIC BLOOD PRESSURE: 88 MMHG

## 2025-05-23 DIAGNOSIS — R07.89 OTHER CHEST PAIN: ICD-10-CM

## 2025-05-23 DIAGNOSIS — R06.09 DYSPNEA ON EXERTION: ICD-10-CM

## 2025-05-23 DIAGNOSIS — E78.5 DYSLIPIDEMIA: ICD-10-CM

## 2025-05-23 LAB
AORTIC DIMENSIONLESS INDEX: 0.87 (DI)
ASCENDING AORTA: 3.8 CM
AV MEAN PRESS GRAD SYS DOP V1V2: 5.8 MMHG
AV VMAX SYS DOP: 178.5 CM/SEC
BH CV ECHO MEAS - AI P1/2T: 912 MSEC
BH CV ECHO MEAS - AO MAX PG: 12.8 MMHG
BH CV ECHO MEAS - AO ROOT DIAM: 3 CM
BH CV ECHO MEAS - AO V2 VTI: 31.8 CM
BH CV ECHO MEAS - AVA(I,D): 2.45 CM2
BH CV ECHO MEAS - IVS/LVPW: 1 CM
BH CV ECHO MEAS - IVSD: 1 CM
BH CV ECHO MEAS - LA DIMENSION: 3.9 CM
BH CV ECHO MEAS - LAT PEAK E' VEL: 19 CM/SEC
BH CV ECHO MEAS - LV MAX PG: 7.5 MMHG
BH CV ECHO MEAS - LV MEAN PG: 3.2 MMHG
BH CV ECHO MEAS - LV V1 MAX: 136.7 CM/SEC
BH CV ECHO MEAS - LV V1 VTI: 27.5 CM
BH CV ECHO MEAS - LVIDD: 4.8 CM
BH CV ECHO MEAS - LVIDS: 3.2 CM
BH CV ECHO MEAS - LVOT AREA: 2.8 CM2
BH CV ECHO MEAS - LVOT DIAM: 1.9 CM
BH CV ECHO MEAS - LVPWD: 1 CM
BH CV ECHO MEAS - MED PEAK E' VEL: 12 CM/SEC
BH CV ECHO MEAS - MV A MAX VEL: 80.2 CM/SEC
BH CV ECHO MEAS - MV DEC SLOPE: 351.1 CM/SEC2
BH CV ECHO MEAS - MV E MAX VEL: 78 CM/SEC
BH CV ECHO MEAS - MV E/A: 0.97
BH CV ECHO MEAS - MV MAX PG: 3.1 MMHG
BH CV ECHO MEAS - MV MEAN PG: 1 MMHG
BH CV ECHO MEAS - MV P1/2T: 73 MSEC
BH CV ECHO MEAS - MV V2 VTI: 24.3 CM
BH CV ECHO MEAS - MVA(P1/2T): 3 CM2
BH CV ECHO MEAS - MVA(VTI): 3.2 CM2
BH CV ECHO MEAS - PA ACC TIME: 0.09 SEC
BH CV ECHO MEAS - PA V2 MAX: 128.6 CM/SEC
BH CV ECHO MEAS - RAP SYSTOLE: 3 MMHG
BH CV ECHO MEAS - RVSP: 20 MMHG
BH CV ECHO MEAS - SV(LVOT): 78 ML
BH CV ECHO MEAS - SVI(LVOT): 43.2 ML/M2
BH CV ECHO MEAS - TAPSE (>1.6): 2.5 CM
BH CV ECHO MEAS - TR MAX PG: 16.7 MMHG
BH CV ECHO MEAS - TR MAX VEL: 204 CM/SEC
BH CV ECHO MEASUREMENTS AVERAGE E/E' RATIO: 5.03
BH CV REST NUCLEAR ISOTOPE DOSE: 9.9 MCI
BH CV STRESS BP STAGE 1: NORMAL
BH CV STRESS BP STAGE 2: NORMAL
BH CV STRESS BP STAGE 3: NORMAL
BH CV STRESS DURATION MIN STAGE 1: 3
BH CV STRESS DURATION MIN STAGE 2: 3
BH CV STRESS DURATION MIN STAGE 3: 3
BH CV STRESS DURATION MIN STAGE 4: 1
BH CV STRESS DURATION SEC STAGE 1: 0
BH CV STRESS DURATION SEC STAGE 2: 0
BH CV STRESS DURATION SEC STAGE 3: 0
BH CV STRESS DURATION SEC STAGE 4: 45
BH CV STRESS GRADE STAGE 1: 10
BH CV STRESS GRADE STAGE 2: 12
BH CV STRESS GRADE STAGE 3: 14
BH CV STRESS GRADE STAGE 4: 16
BH CV STRESS HR STAGE 1: 95
BH CV STRESS HR STAGE 2: 114
BH CV STRESS HR STAGE 3: 114
BH CV STRESS HR STAGE 4: 146
BH CV STRESS METS STAGE 1: 5
BH CV STRESS METS STAGE 2: 7.5
BH CV STRESS METS STAGE 3: 10
BH CV STRESS METS STAGE 4: 13.5
BH CV STRESS NUCLEAR ISOTOPE DOSE: 33 MCI
BH CV STRESS O2 STAGE 1: 97
BH CV STRESS O2 STAGE 2: 98
BH CV STRESS PROTOCOL 1: NORMAL
BH CV STRESS RECOVERY BP: NORMAL MMHG
BH CV STRESS RECOVERY HR: 75 BPM
BH CV STRESS RECOVERY O2: 98 %
BH CV STRESS SPEED STAGE 1: 1.7
BH CV STRESS SPEED STAGE 2: 2.5
BH CV STRESS SPEED STAGE 3: 3.4
BH CV STRESS SPEED STAGE 4: 4.2
BH CV STRESS STAGE 1: 1
BH CV STRESS STAGE 2: 2
BH CV STRESS STAGE 3: 3
BH CV STRESS STAGE 4: 4
BH CV VAS BP LEFT ARM: NORMAL MMHG
BH CV XLRA - RV BASE: 3.7 CM
BH CV XLRA - RV LENGTH: 7.4 CM
BH CV XLRA - RV MID: 2.7 CM
BH CV XLRA - TDI S': 16.2 CM/SEC
IVRT: 74 MS
LEFT ATRIUM VOLUME INDEX: 23.8 ML/M2
LV EF BIPLANE MOD: 62.6 %
MAXIMAL PREDICTED HEART RATE: 157 BPM
PERCENT MAX PREDICTED HR: 92.99 %
SPECT HRT GATED+EF W RNC IV: 85 %
STRESS BASELINE BP: NORMAL MMHG
STRESS BASELINE HR: 56 BPM
STRESS O2 SAT REST: 98 %
STRESS PERCENT HR: 109 %
STRESS POST ESTIMATED WORKLOAD: 11 METS
STRESS POST EXERCISE DUR MIN: 10 MIN
STRESS POST EXERCISE DUR SEC: 45 SEC
STRESS POST O2 SAT PEAK: 98 %
STRESS POST PEAK BP: NORMAL MMHG
STRESS POST PEAK HR: 146 BPM
STRESS TARGET HR: 133 BPM

## 2025-05-23 PROCEDURE — A9500 TC99M SESTAMIBI: HCPCS | Performed by: NURSE PRACTITIONER

## 2025-05-23 PROCEDURE — 93017 CV STRESS TEST TRACING ONLY: CPT

## 2025-05-23 PROCEDURE — 93306 TTE W/DOPPLER COMPLETE: CPT

## 2025-05-23 PROCEDURE — 78452 HT MUSCLE IMAGE SPECT MULT: CPT

## 2025-05-23 PROCEDURE — 34310000005 TECHNETIUM SESTAMIBI: Performed by: NURSE PRACTITIONER

## 2025-05-23 RX ADMIN — TECHNETIUM TC 99M SESTAMIBI 1 DOSE: 1 INJECTION INTRAVENOUS at 10:10

## 2025-05-23 RX ADMIN — TECHNETIUM TC 99M SESTAMIBI 1 DOSE: 1 INJECTION INTRAVENOUS at 12:40

## 2025-05-23 NOTE — PROGRESS NOTES
Your myocardial perfusion stress test results have been reviewed your results are considered acceptable.  No concerns identified for heart blockages affecting blood flow to your heart muscle during your stress test.  (Released in dotCloudt)

## 2025-05-27 ENCOUNTER — TELEPHONE (OUTPATIENT)
Dept: CARDIOLOGY | Facility: HOSPITAL | Age: 65
End: 2025-05-27
Payer: COMMERCIAL

## 2025-05-27 NOTE — TELEPHONE ENCOUNTER
Attempted to call patient to discuss recent test results. No answer. Left message stating I will try again this afternoon to reach patient.

## 2025-06-11 ENCOUNTER — OFFICE VISIT (OUTPATIENT)
Dept: CARDIOLOGY | Facility: HOSPITAL | Age: 65
End: 2025-06-11
Payer: COMMERCIAL

## 2025-06-11 VITALS
RESPIRATION RATE: 16 BRPM | BODY MASS INDEX: 26.91 KG/M2 | OXYGEN SATURATION: 95 % | WEIGHT: 161.5 LBS | DIASTOLIC BLOOD PRESSURE: 63 MMHG | HEART RATE: 63 BPM | SYSTOLIC BLOOD PRESSURE: 118 MMHG | HEIGHT: 65 IN

## 2025-06-11 DIAGNOSIS — R06.09 DYSPNEA ON EXERTION: ICD-10-CM

## 2025-06-11 DIAGNOSIS — I35.1 NONRHEUMATIC AORTIC VALVE INSUFFICIENCY: ICD-10-CM

## 2025-06-11 DIAGNOSIS — R07.89 OTHER CHEST PAIN: Primary | ICD-10-CM

## 2025-06-11 DIAGNOSIS — I25.119 CORONARY ARTERY DISEASE INVOLVING NATIVE CORONARY ARTERY OF NATIVE HEART WITH ANGINA PECTORIS: ICD-10-CM

## 2025-06-11 DIAGNOSIS — E78.5 DYSLIPIDEMIA: ICD-10-CM

## 2025-06-11 RX ORDER — ASPIRIN 81 MG/1
81 TABLET ORAL DAILY
Start: 2025-06-11

## 2025-06-11 NOTE — PROGRESS NOTES
Chief Complaint  Follow-up    Subjective      History of Present Illness {  Problem List  Visit  Diagnosis   Encounters  Notes  Medications  Labs  Result Review Imaging  Media :23}     Sherin Azul, 65 y.o. female with past medical history significant for tobacco use, hyperlipidemia, GERD, osteoarthritis, breast cancer, alcohol use, and MRSA, who presents to [facility] for Follow-up  Since time of previous evaluation, patient has undergone nuclear medicine stress testing, and echocardiogram.     At time of today's evaluation, patient denies any additional episodes of chest pain or pressure.  Specifically, she denies any exertional chest discomfort.  She does endorse a very mild dyspnea on exertion which has not been limiting to her activities recently.  She denies any syncope, near syncope, palpitations, or significant lower extremity edema.  She has obtained compression garments which she is wearing while at work.      Echocardiogram 5/23/2025:    Left ventricular systolic function is normal. Calculated left ventricular EF = 62.6% Left ventricular ejection fraction appears to be 61 - 65%.    Left ventricular diastolic function was normal.    Estimated right ventricular systolic pressure from tricuspid regurgitation is normal (<35 mmHg). Calculated right ventricular systolic pressure from tricuspid regurgitation is 20 mmHg.    Mild to moderate aortic valve regurgitation       SPECT stress test 5/23/2025:    Patient denied chest pain, but did report SOA (SpO2 WNL), lightheadedness and headache at peak exercise which resolved in recovery.    Expected exercise duration = 6:30, actual = 10:45.    A horizontal ST segment depression of 1 mm in the inferolateral leads was noted during stress (II, III, aVF, V5, V6, V3 and V4), beginning at 5 minutes of stress, and returning to baseline after less than 1 minute of recovery.    Left ventricular ejection fraction is  hyperdynamic (Calculated EF > 70%).    Myocardial perfusion imaging indicates a normal myocardial perfusion study with no evidence of ischemia. Impressions are consistent with a low risk study      Initial presentation:  Patient presented to [facility] ED on 5/9/2025 with a chief complaint of chest pain.  Patient endorsed pain in her left scapula with radiation into her left anterior chest over the past week.  There is also radiation of pain into her left arm.  Patient also endorsed feeling like her arms and feet will swell when she lays down flat at night.  Twelve-lead EKG while in the ED showed normal sinus rhythm, rate 64, normal EKG.  Chest x-ray showed no acute cardiopulmonary process.  High-sensitivity troponin x 2 noted to be unremarkable.  Patient was discharged with instructions to take Tylenol, and ibuprofen as needed, and follow-up with heart and valve.    At time of today's evaluation, patient denies additional episodes of exertional chest pain or pressure.  She does continue to have right neck, shoulder, and back pain.  Patient does endorse recent and worsening dyspnea on exertion.  She denies syncope, but does endorse some episodes of dizziness.  Patient has been experiencing rare episodes of palpitations occurring while at rest.  She does note some mild lower extremity edema which typically occurs after a long day at work.  Patient does not routinely monitor blood pressure or heart rate while at home.      Activity level: Active at work, otherwise no routine exercise  Caffeine intake: 3 cups of coffee daily, and excedrin in afternoon  Water intake: indaquate  Nicotine: quit 8 months prior, was 1-1.5 PPD  Family history: Negative for early onset coronary artery disease in an immediate family member    Of note, patient underwent a CT scan of the chest for lung cancer screening on 4/21/2025 with noted moderately extensive coronary artery calcification.  Low-dose CT chest scans have noted  "coronary calcifications dating back to 2020.      Objective     Vital Signs:   Vitals:    06/11/25 1428   BP: 118/63   BP Location: Left arm   Patient Position: Sitting   Cuff Size: Adult   Pulse: 63   Resp: 16   SpO2: 95%   Weight: 73.3 kg (161 lb 8 oz)   Height: 165.1 cm (65\")       Body mass index is 26.88 kg/m².  Physical Exam  Vitals and nursing note reviewed.   Constitutional:       Appearance: Normal appearance.   HENT:      Head: Normocephalic.   Eyes:      Pupils: Pupils are equal, round, and reactive to light.   Cardiovascular:      Rate and Rhythm: Normal rate and regular rhythm.      Pulses: Normal pulses.      Heart sounds: Normal heart sounds. No murmur heard.  Pulmonary:      Effort: Pulmonary effort is normal.      Breath sounds: Normal breath sounds.   Abdominal:      General: Bowel sounds are normal.      Palpations: Abdomen is soft.   Musculoskeletal:         General: Normal range of motion.      Cervical back: Normal range of motion.      Right lower leg: No edema.      Left lower leg: No edema.   Skin:     General: Skin is warm and dry.      Capillary Refill: Capillary refill takes less than 2 seconds.   Neurological:      Mental Status: She is alert and oriented to person, place, and time.   Psychiatric:         Mood and Affect: Mood normal.         Thought Content: Thought content normal.                Data Reviewed:{ Labs  Result Review  Imaging  Med Tab  Media :23}     Lab Results   Component Value Date    WBC 10.22 05/09/2025    HGB 14.2 05/09/2025    HCT 42.9 05/09/2025    MCV 93.7 05/09/2025     05/09/2025      Lab Results   Component Value Date    GLUCOSE 93 05/09/2025    BUN 12 05/09/2025    CREATININE 0.66 05/09/2025     05/09/2025    K 4.2 05/09/2025     05/09/2025    CALCIUM 9.5 05/09/2025    PROTEINTOT 7.3 05/09/2025    ALBUMIN 4.3 05/09/2025    ALT 18 05/09/2025    AST 21 05/09/2025    ALKPHOS 72 05/09/2025    BILITOT 0.5 05/09/2025    GLOB 3.0 05/09/2025    " AGRATIO 1.4 05/09/2025    BCR 18.2 05/09/2025    ANIONGAP 11.0 05/09/2025    EGFR 98.7 05/09/2025      Lab Results   Component Value Date    CHOL 204 (H) 03/07/2025    TRIG 190 (H) 03/07/2025    HDL 49 03/07/2025     (H) 03/07/2025      Lab Results   Component Value Date    CKTOTAL 224 (H) 05/16/2022    TROPONINT <6 05/09/2025    CT Chest Low Dose Cancer Screening WO (04/21/2025 09:42)  XR Chest 1 View (05/09/2025 13:10)  ECG 12 Lead Chest Pain (05/09/2025 12:11)  ECG 12 Lead Chest Pain (05/09/2025 13:20)    Assessment & Plan   Assessment and Plan {CC Problem List  Visit Diagnosis  ROS  Review (Popup)  Health Maintenance  Quality  BestPractice  Medications  SmartSets  SnapShot Encounters  Media :23}     1. Other chest pain  - Patient denies additional episodes of chest discomfort since previous evaluation  -We have reviewed and discussed most recent cardiac testing including echocardiogram, and stress testing.  -Discussed with patient finding of mild to moderate aortic valve regurgitation.  Discussed recommendation to refer patient to cardiology physician for finding of aortic valve regurgitation, and for long-term monitoring and management of coronary artery disease  -Echocardiogram 5/23/2025:    Left ventricular systolic function is normal. Calculated left ventricular EF = 62.6% Left ventricular ejection fraction appears to be 61 - 65%.    Left ventricular diastolic function was normal.    Estimated right ventricular systolic pressure from tricuspid regurgitation is normal (<35 mmHg). Calculated right ventricular systolic pressure from tricuspid regurgitation is 20 mmHg.    Mild to moderate aortic valve regurgitation   -SPECT stress test 5/23/2025:    Patient denied chest pain, but did report SOA (SpO2 WNL), lightheadedness and headache at peak exercise which resolved in recovery.    Expected exercise duration = 6:30, actual = 10:45.    A horizontal ST segment depression of 1 mm in the  "inferolateral leads was noted during stress (II, III, aVF, V5, V6, V3 and V4), beginning at 5 minutes of stress, and returning to baseline after less than 1 minute of recovery.    Left ventricular ejection fraction is hyperdynamic (Calculated EF > 70%).    Myocardial perfusion imaging indicates a normal myocardial perfusion study with no evidence of ischemia. Impressions are consistent with a low risk study    2. Dyspnea on exertion  - Patient denies any new or worsening dyspnea since prior evaluation    3. Dyslipidemia  -Lipid panel currently managed by primary care  -Recommended to continue rosuvastatin 10 mg daily      4.  Coronary artery disease  - As previously mentioned, patient has had noted coronary artery calcifications dating back to 2020.  Most recent CT scan of the chest notes \"moderately extensive coronary artery calcification\"  - Recommend patient to continue rosuvastatin  - Discussed with patient to initiate aspirin 81 mg daily in addition to rosuvastatin.  Could consider adding beta-blocker as well although patient's heart rate and blood pressure appear to be borderline.    5.  Aortic valve regurgitation  - As previously mentioned, patient's most recent echocardiogram notes mild to moderate aortic valve regurgitation.  Will refer patient to cardiology physician for long-term monitoring and management of this finding in addition to coronary artery disease        Patient may be seen by heart and valve as needed or if symptoms change or worsen.  Otherwise, recommend follow-up as scheduled with Dr. Lee with cardiology.    Follow Up {Instructions Charge Capture  Follow-up Communications :23}     Return if symptoms worsen or fail to improve.  .    Patient was given instructions and counseling regarding her condition or for health maintenance advice. Please see specific information pulled into the AVS if appropriate. Patient was instructed to call the [facility] with any questions, " concerns, or worsening symptoms.    Dictated Utilizing Dragon Dictation   Please note that portions of this note were completed with a voice recognition program. Part of this note may be an electronic transcription/translation of spoken language to printed text using the Dragon Dictation System.

## 2025-08-12 ENCOUNTER — PATIENT OUTREACH (OUTPATIENT)
Dept: ONCOLOGY | Facility: CLINIC | Age: 65
End: 2025-08-12
Payer: COMMERCIAL

## 2025-08-12 ENCOUNTER — OFFICE VISIT (OUTPATIENT)
Dept: PULMONOLOGY | Facility: CLINIC | Age: 65
End: 2025-08-12
Payer: COMMERCIAL

## 2025-08-12 VITALS
DIASTOLIC BLOOD PRESSURE: 80 MMHG | OXYGEN SATURATION: 95 % | WEIGHT: 160 LBS | HEART RATE: 70 BPM | SYSTOLIC BLOOD PRESSURE: 122 MMHG | HEIGHT: 65 IN | BODY MASS INDEX: 26.66 KG/M2

## 2025-08-12 DIAGNOSIS — Z87.891 PERSONAL HISTORY OF SMOKING: ICD-10-CM

## 2025-08-12 DIAGNOSIS — R91.1 LUNG NODULE: Primary | ICD-10-CM

## 2025-08-12 PROCEDURE — 99204 OFFICE O/P NEW MOD 45 MIN: CPT | Performed by: INTERNAL MEDICINE

## 2025-08-14 ENCOUNTER — OFFICE VISIT (OUTPATIENT)
Dept: CARDIOLOGY | Facility: CLINIC | Age: 65
End: 2025-08-14
Payer: COMMERCIAL

## 2025-08-14 VITALS
BODY MASS INDEX: 26.16 KG/M2 | DIASTOLIC BLOOD PRESSURE: 64 MMHG | HEIGHT: 65 IN | SYSTOLIC BLOOD PRESSURE: 110 MMHG | HEART RATE: 56 BPM | WEIGHT: 157 LBS | OXYGEN SATURATION: 96 %

## 2025-08-14 DIAGNOSIS — E78.5 DYSLIPIDEMIA: ICD-10-CM

## 2025-08-14 DIAGNOSIS — R06.09 DYSPNEA ON EXERTION: ICD-10-CM

## 2025-08-14 DIAGNOSIS — I35.1 NONRHEUMATIC AORTIC VALVE INSUFFICIENCY: Primary | ICD-10-CM

## 2025-08-14 PROCEDURE — 99214 OFFICE O/P EST MOD 30 MIN: CPT | Performed by: INTERNAL MEDICINE
